# Patient Record
Sex: FEMALE | Race: WHITE | NOT HISPANIC OR LATINO | Employment: OTHER | URBAN - METROPOLITAN AREA
[De-identification: names, ages, dates, MRNs, and addresses within clinical notes are randomized per-mention and may not be internally consistent; named-entity substitution may affect disease eponyms.]

---

## 2019-08-09 ENCOUNTER — OFFICE VISIT CONVERTED (OUTPATIENT)
Dept: ORTHOPEDIC SURGERY | Facility: CLINIC | Age: 59
End: 2019-08-09
Attending: ORTHOPAEDIC SURGERY

## 2019-10-25 ENCOUNTER — HOSPITAL ENCOUNTER (OUTPATIENT)
Dept: GENERAL RADIOLOGY | Facility: HOSPITAL | Age: 59
Discharge: HOME | End: 2019-10-25
Attending: FAMILY MEDICINE

## 2019-11-05 ENCOUNTER — OFFICE VISIT CONVERTED (OUTPATIENT)
Dept: ORTHOPEDIC SURGERY | Facility: CLINIC | Age: 59
End: 2019-11-05
Attending: PHYSICIAN ASSISTANT

## 2020-03-05 ENCOUNTER — OFFICE VISIT CONVERTED (OUTPATIENT)
Dept: ORTHOPEDIC SURGERY | Facility: CLINIC | Age: 60
End: 2020-03-05
Attending: ORTHOPAEDIC SURGERY

## 2021-02-09 ENCOUNTER — HOSPITAL ENCOUNTER (OUTPATIENT)
Dept: GENERAL RADIOLOGY | Facility: HOSPITAL | Age: 61
Discharge: HOME | End: 2021-02-09
Attending: NURSE PRACTITIONER

## 2021-02-16 ENCOUNTER — HOSPITAL ENCOUNTER (OUTPATIENT)
Dept: GENERAL RADIOLOGY | Facility: HOSPITAL | Age: 61
Discharge: HOME | End: 2021-02-16
Attending: NURSE PRACTITIONER

## 2021-04-02 ENCOUNTER — HOSPITAL ENCOUNTER (OUTPATIENT)
Dept: VACCINE CLINIC | Facility: HOSPITAL | Age: 61
Discharge: HOME | End: 2021-04-02
Attending: INTERNAL MEDICINE

## 2021-04-07 ENCOUNTER — OFFICE VISIT CONVERTED (OUTPATIENT)
Dept: GASTROENTEROLOGY | Facility: CLINIC | Age: 61
End: 2021-04-07
Attending: NURSE PRACTITIONER

## 2021-04-07 ENCOUNTER — CONVERSION ENCOUNTER (OUTPATIENT)
Dept: GASTROENTEROLOGY | Facility: CLINIC | Age: 61
End: 2021-04-07

## 2021-04-09 ENCOUNTER — HOSPITAL ENCOUNTER (OUTPATIENT)
Dept: PREADMISSION TESTING | Facility: HOSPITAL | Age: 61
Discharge: HOME | End: 2021-04-09
Attending: INTERNAL MEDICINE

## 2021-04-10 LAB — SARS-COV-2 RNA SPEC QL NAA+PROBE: NOT DETECTED

## 2021-04-13 ENCOUNTER — HOSPITAL ENCOUNTER (OUTPATIENT)
Dept: GENERAL RADIOLOGY | Facility: HOSPITAL | Age: 61
Discharge: HOME | End: 2021-04-13
Attending: NURSE PRACTITIONER

## 2021-04-14 ENCOUNTER — HOSPITAL ENCOUNTER (OUTPATIENT)
Dept: GASTROENTEROLOGY | Facility: HOSPITAL | Age: 61
Setting detail: HOSPITAL OUTPATIENT SURGERY
Discharge: HOME | End: 2021-04-14
Attending: INTERNAL MEDICINE

## 2021-04-26 ENCOUNTER — HOSPITAL ENCOUNTER (OUTPATIENT)
Dept: VACCINE CLINIC | Facility: HOSPITAL | Age: 61
Discharge: HOME | End: 2021-04-26
Attending: INTERNAL MEDICINE

## 2021-05-11 NOTE — H&P
History and Physical      Patient Name: Ida Vincent   Patient ID: [id number]   Sex: Female   YOB: 1960    Primary Care Provider: JOSE HURTADO   Referring Provider: JOSE HURTADO    Visit Date: April 7, 2021    Provider: BRYANT Warner   Location: [facility]   Location Address: [address] [place] [address], [address]  [id number]   Location Phone: [phone]          Chief Complaint  · Weight Loss      History Of Present Illness  The patient is a 60 year old /White female who presents on referral from JOSE HURTADO for a gastroenterology evaluation.      She presents for evaluation of unexplained weight loss.    Reports that she's lost 30 lbs. since November.  She had all her teeth pulled Oct/Nov. 2020.  She has dentures, but finds it difficult to eat certain foods.  Reports that she experiences early satiety.      She fell in August and has 2 compression fractures in spine.      Admits vomiting that usually occurs in the mornings after she takes a few sips of coffee.  This morning, emesis was bright yellow prior to eating.  Denies abdominal pain.      Admits intermittent constipation that she takes a laxative for about 3 times per week.  Previously tried Metamucil and miralax.  Denies rectal bleeding.      Previous EGD/colonoscopy 9 years ago - normal per patient.    Previously dx with GERD.  No symptoms in the past 3 years.                   Past Medical History  Arthritis; Asthma; Chest pain; Chronic Obstructive Pulmonary Disease; Heart block; Hyperlipemia; Lung disease; Seasonal allergies; Shortness of Breath; Thyroid disorder         Past Surgical History  Colonoscopy; EGD; Gallbladder; Hysterectomy; Metal implants; Surgical Clips; Tonsillectomy         Medication List  Aspir-81 81 mg oral tablet,delayed release (DR/EC); baclofen 10 mg oral tablet; Centrum Silver 0.4-300-250 mg-mcg-mcg oral tablet;  "Crestor 40 mg oral tablet; Fosamax 70 mg oral tablet; hydrocodone-acetaminophen 5-325 mg oral tablet; hydroxyzine HCl 25 mg oral tablet; MoviPrep 100-7.5-2.691 gram oral powder in packet; ProAir HFA 90 mcg/actuation inhalation HFA aerosol inhaler; Synthroid 50 mcg oral tablet; Toprol XL 25 mg oral tablet extended release 24 hr; Toprol XL 50 mg oral tablet extended release 24 hr; trazodone 100 mg oral tablet; Trelegy Ellipta 100-62.5-25 mcg inhalation blister with device; Trintellix 10 mg oral tablet; Zofran 4 mg oral tablet; Zyrtec 10 mg oral capsule         Allergy List  Cipro; erythromycin; prednisone; TETANUS; thimerosal       Allergies Reconciled  Family Medical History  Heart Disease; Cancer, Unspecified; Diabetes, unspecified type; - No Family History of Colorectal Cancer; Family history of Arthritis         Social History  Alcohol Use (Never); Homemaker.; lives with spouse; .; Recreational Drug Use (Never); Retired.; Tobacco (Current every day)         Review of Systems  · Constitutional  o Denies  o : chills, fever  · Eyes  o Denies  o : blurred vision, changes in vision  · Cardiovascular  o Denies  o : chest pain, irregular heart beats  · Respiratory  o Denies  o : shortness of breath, cough  · Gastrointestinal  o Admits  o : See HPI  · Genitourinary  o Denies  o : dysuria, blood in urine  · Integument  o Denies  o : rash, new skin lesions  · Neurologic  o Denies  o : tingling or numbness, seizures  · Musculoskeletal  o Denies  o : joint pain, joint swelling  · Endocrine  o Admits  o : weight loss  o Denies  o : weight gain  · Psychiatric  o Denies  o : anxiety, depression      Vitals  Date Time BP Position Site L\R Cuff Size HR RR TEMP (F) WT  HT  BMI kg/m2 BSA m2 O2 Sat FR L/min FiO2 HC       04/07/2021 02:02 /55 Sitting    55 - R  97.5 97lbs 6oz 5'  5\" 16.2 1.42             Physical Examination  · Constitutional  o Appearance  o : well developed, well-nourished, in no acute " distress  · Eyes  o Vision  o :   § Visual Fields  § : eyes move symmetrical in all directions  o Sclerae  o : anicteric  o Pupils and Irises  o : pupils equal and symmetrical  · Neck  o Inspection/Palpation  o : supple  · Respiratory  o Respiratory Effort  o : breathing unlabored  o Inspection of Chest  o : normal appearance, no retractions  o Auscultation of Lungs  o : clear to auscultation bilaterally  · Cardiovascular  o Heart  o :   § Auscultation of Heart  § : no murmurs, gallops or rubs  · Gastrointestinal  o Abdominal Examination  o : soft, nontender to palpation, with normal active bowel sounds, no appreciable hepatosplenomegaly  o Digital Rectal Exam  o : deferred  · Lymphatic  o Neck  o : no palpable lymphadenopathy  · Skin and Subcutaneous Tissue  o General Inspection  o : without focal lesions; turgor is normal  · Psychiatric  o General  o : Alert and oriented x3  o Mood and Affect  o : Mood and affect are appropriate to circumstances  · Extremities  o Extremities  o : No edema, no cyanosis          Assessment  · Pre-op testing     V72.84/Z01.818  · Early satiety     780.94/R68.81  · Vomiting alone     787.03/R11.11  · Weight loss     783.21/R63.4  · Anorexia     783.0/R63.0  · Fatigue     780.79/R53.83      Plan  · Orders  o [facility] Pre-Op Covid-19 Screening (32330) - - 04/07/2021  o CT abdomen and pelvis with contrast (09555) - - 04/07/2021  o Consent for Colonoscopy with Possible Biopsy - Possible risks/complications, benefits, and alternatives to surgical or invasive procedure have been explained to patient and/or legal guardian. -Patient has been evaluated and can tolerate anesthesia and/or sedation. Risks, benefits, and alternatives to anesthesia and sedation have been explained to patient and/or legal guardian. (04017) - - 04/07/2021  o Consent for Esophagogastroduodenoscopy (EGD) - Possible risks/complications, benefits, and alternatives to surgical or invasive procedure have been explained to  patient and/or legal guardian. - Patient has been evaluated and can tolerate anesthesia and/or sedation. Risks, benefits, and alternatives to anesthesia and sedation have been explained to patient and/or legal guardian. (68151) - - 04/07/2021  · Medications  o Medications have been Reconciled  · Instructions  o Handouts provided: Pre-procedure instructions including date and time and location of procedure.  o PLAN: Proceed with procedure. Patient understands risks and benefits and is willing to proceed. Understands the risks include, but are not limited to, bleeding and/or perforation.  o Information given on current diagnoses.  o Electronically Identified Patient Education Materials Provided Electronically  · Disposition  o Follow Up after procedure  · Referrals  o ID: [id number] Date: 04/07/2021 Type: Inbound  Specialty: Gastroenterology            Electronically Signed by: BRYANT Warner -Author on April 7, 2021 04:06:45 PM

## 2021-05-14 VITALS
HEART RATE: 55 BPM | BODY MASS INDEX: 16.22 KG/M2 | HEIGHT: 65 IN | DIASTOLIC BLOOD PRESSURE: 55 MMHG | SYSTOLIC BLOOD PRESSURE: 105 MMHG | WEIGHT: 97.37 LBS | TEMPERATURE: 97.5 F

## 2021-05-15 VITALS — BODY MASS INDEX: 21.07 KG/M2 | HEIGHT: 65 IN | WEIGHT: 126.44 LBS | HEART RATE: 79 BPM | OXYGEN SATURATION: 97 %

## 2021-05-15 VITALS — BODY MASS INDEX: 21.16 KG/M2 | OXYGEN SATURATION: 95 % | WEIGHT: 127 LBS | HEIGHT: 65 IN | HEART RATE: 87 BPM

## 2021-05-15 VITALS — BODY MASS INDEX: 21.16 KG/M2 | OXYGEN SATURATION: 93 % | WEIGHT: 127 LBS | HEART RATE: 111 BPM | HEIGHT: 65 IN

## 2021-05-18 ENCOUNTER — OFFICE VISIT CONVERTED (OUTPATIENT)
Dept: NEUROSURGERY | Facility: CLINIC | Age: 61
End: 2021-05-18
Attending: NEUROLOGICAL SURGERY

## 2021-06-04 ENCOUNTER — HOSPITAL ENCOUNTER (OUTPATIENT)
Dept: PERIOP | Facility: HOSPITAL | Age: 61
Setting detail: HOSPITAL OUTPATIENT SURGERY
Discharge: HOME | End: 2021-06-04
Attending: NEUROLOGICAL SURGERY

## 2021-06-05 NOTE — H&P
History and Physical      Patient Name: Ida Vincent   Patient ID: [id number]   Sex: Female   YOB: 1960    Primary Care Provider: JOSE HURTADO   Referring Provider: JOSE HURTADO    Visit Date: May 18, 2021    Provider: Mukul Cummings MD   [facility]   Location Address: [address] [place] [address]  [id number]   Location Phone: [phone]          Chief Complaint  · Back and left leg pain  · Surgical History and Physical      History Of Present Illness  The patient is a 60 year old /White female, who presents on referral from JOSE HURTADO, for a neurosurgical evaluation of low back pain and left leg pain.   The pain developed acutely approximately 9 months ago. It is 6-7/10 in severity has an aching and a sharp quality and radiates into the left groin/buttock/posterior thigh in a nonspecific distribution. The back is worse than the leg (the leg is not very painful). The pain has been constant. The pain tends to be maximal at no specific time, but waxes and wanes in severity throughout the day. The patient states the pain is aggravated by prolonged standing, sleeping, and extending her back. It is alleviated by heat.   She also reports intermittent paresthesia in the leg. She denies bowel or bladder dysfunction. The patient has no prior history of neck or back surgery.   RECENT INTERVENTIONS:  She has been previously treated with pain medication and back brace (doesn't wear often).   INFORMATION REVIEWED:  The following information was reviewed: radiology reports and images. MRI of the lumbar spine revealed L2 compression fracture and moderate stenosis at L4-5. There is a questionable healed fracture of the T12 superior endplate fracture.       Past Medical History  Arthritis; Asthma; Chest pain; Chronic Obstructive Pulmonary Disease; Heart block; Hyperlipemia; Lumbago; Lung disease; Seasonal  allergies; Shortness of Breath; Thyroid disorder         Past Surgical History  Colonoscopy; EGD; Gallbladder; Hysterectomy; Metal implants; Surgical Clips; Tonsillectomy         Medication List  Aspir-81 81 mg oral tablet,delayed release (DR/EC); baclofen 10 mg oral tablet; Centrum Silver 0.4-300-250 mg-mcg-mcg oral tablet; Crestor 40 mg oral tablet; Fosamax 70 mg oral tablet; hydrocodone-acetaminophen 5-325 mg oral tablet; hydroxyzine HCl 25 mg oral tablet; ProAir HFA 90 mcg/actuation inhalation HFA aerosol inhaler; quetiapine 25 mg oral tablet; Synthroid 50 mcg oral tablet; Toprol XL 25 mg oral tablet extended release 24 hr; Toprol XL 50 mg oral tablet extended release 24 hr; trazodone 100 mg oral tablet; Trelegy Ellipta 100-62.5-25 mcg inhalation blister with device; Trintellix 10 mg oral tablet; Zofran 4 mg oral tablet; Zyrtec 10 mg oral capsule         Allergy List  Cipro; erythromycin; prednisone; TETANUS; thimerosal       Allergies Reconciled  Family Medical History  Heart Disease; Cancer, Unspecified; Diabetes, unspecified type; - No Family History of Colorectal Cancer; Family history of Arthritis         Social History  Alcohol Use (Never); Homemaker.; lives with spouse; .; Recreational Drug Use (Never); Retired.; Tobacco (Former)         Review of Systems  · Constitutional  o Denies  o : chills, excessive sweating, fatigue, fever, sycope/passing out, weight gain, weight loss  · Eyes  o Denies  o : changes in vision, blurry vision, double vision  · HENT  o Admits  o : seasonal allergies  o Denies  o : loss of hearing, ringing in the ears, ear aches, sore throat, nasal congestion, sinus pain, nose bleeds  · Cardiovascular  o Denies  o : blood clots, swollen legs, anemia, easy burising or bleeding, transfusions  · Respiratory  o Denies  o : shortness of breath, dry cough, productive cough, pneumonia, COPD  · Gastrointestinal  o Denies  o : difficulty swallowing, reflux  · Genitourinary  o Denies  o :  "incontinence  · Neurologic  o Denies  o : headache, seizure, stroke, tremor, loss of balance, falls, dizziness/vertigo, difficulty with sleep, numbness/tingling/paresthesia , difficulty with coordination, difficulty with dexterity, weakness  · Musculoskeletal  o Admits  o : joint pain, pain radiating in leg, low back pain  o Denies  o : neck stiffness/pain, swollen lymph nodes, muscle aches, weakness, spasms, sciatica, pain radiating in arm  · Endocrine  o Denies  o : diabetes, thyroid disorder  · Psychiatric  o Denies  o : anxiety, depression  · All Others Negative      Vitals  Date Time BP Position Site L\R Cuff Size HR RR TEMP (F) WT  HT  BMI kg/m2 BSA m2 O2 Sat FR L/min FiO2 HC       05/18/2021 01:37 PM        97.6 963lbs 0oz 5'  5\" 160.25 4.48             Physical Examination  · Constitutional  o Appearance  o : well-nourished, well developed, alert, in no acute distress  · Respiratory  o Respiratory Effort  o : breathing unlabored  · Cardiovascular  o Peripheral Vascular System  o :   § Extremities  § : no edema or cyanosis  · Musculoskeletal  o Spine  o :   § Inspection/Palpation  § : TTP in the upper lumbar region  o Right Lower Extremity  o :   § Inspection/Palpation  § : no joint or limb tenderness to palpation, no edema present, no ecchymosis  § Joint Stability  § : joint stability within normal limits  o Left Lower Extremity  o :   § Inspection/Palpation  § : no joint or limb tenderness to palpation, no edema present, no ecchymosis  § Joint Stability  § : joint stability within normal limits  · Skin and Subcutaneous Tissue  o Extremities  o :   § Right Lower Extremity  § : no lesions or areas of discoloration  § Left Lower Extremity  § : no lesions or areas of discoloration  o Back  o : no lesions or areas of discoloration  · Neurologic  o Mental Status Examination  o :   § Orientation  § : alert and oriented to time, person, place and events  o Motor Examination  o :   § RLE Strength  § : strength " normal  § RLE Motor Function  § : tone normal, no atrophy, no abnormal movements noted  § LLE Strength  § : strength normal  § LLE Motor Function  § : tone normal, no atrophy, no abnormal movements noted  o Reflexes  o :   § RLE  § : knee and ankle reflexes 2/4, SLR negative  § LLE  § : knee and ankle reflexes 2/4, SLR negative  o Sensation  o :   § Light Touch  § : sensation intact to light touch in extremities  o Gait and Station  o :   § Gait Screening  § : normal gait, able to stand without difficulty  · Psychiatric  o Mood and Affect  o : mood normal, affect appropriate              Assessment  · Lumbar compression fracture     805.4/S32.000A  L2, not fully healed on MRI from Feb.  · Osteoporosis     733.00/M81.0  · Preoperative examination     V72.84/Z01.818      Plan  · Medications  o Medications have been Reconciled  o Transition of Care or Provider Policy  · Instructions  o Encouraged to follow-up with Primary Care Provider for preventative care.  o The ROS and the PFSH were reviewed at today's visit.  o I have discussed the risks and benefits of surgery versus physical therapy, epidural steroids, and other conservative forms of treatment.  o She recently quit smoking.  o She could consider an L2 vertebroplasty for the lower back pain. The leg pain is minimal so I would not recommend surgery for the moderate stenosis.  o She should consider resuming treatment with Fosamax once OK.  o ****Surgical Orders****  o Outpatient  o RISK AND BENEFITS:  o Possible risks/complications, benefits and alternatives to surgical or invasive procedure have been explained to the patient and/or legal guardian.  o PREP: Per protocol;  o IV: Per Anesthesia;  o *******************************************  o PRE- OP MEDICATION ORDER:  o *******************************************  o Kefzol 2 grams IV on call to OR.  o ***************  o Date of Surgical Procedure:   o Please sign permit for: Lumbar two vertebroplasty  o The above  History and Physical must have been completed within 30 days of admission.            Electronically Signed by: Mukul Cummings MD -Author on May 18, 2021 02:13:59 PM

## 2021-06-05 NOTE — PROCEDURES
Patient: CAMDEN MCKEON     Acct: [id number]     Report: #[id number]  MR #:  [id number]     DOS: 2021 1015     : 1960  DICTATING: Mukul Cummings  ***Signed***  --------------------------------------------------------------------------------------------------------------------  Post Procedure/Operative Note      Date       21            Pre-Operative Diagnosis:      Osteoporotic lumbar 2 compression fracture            Post-Operative Diagnosis:      Same as pre-op diagnosis            Surgeon/Assistants      Surgeon      Emiliano Reinoso            Anesthesia      General            Procedure Performed/Technique      Lumbar 2 vertebroplasty            Specimen/Tissue Removed:      None            Findings:            Compression deformity at lumbar 2 with good filling pattern after      injection of PMMA            Complications:      No            Estimated Blood Loss:      None            Procedure:      60-year-old female with lumbar 2 compression fracture and history of     osteoporosis.  Fracture was not healing as desired and patient opted for     vertebroplasty.            Description of procedure: After informed consent was obtained, the patient was     brought to the operating room.  After the induction of adequate general     endotracheal esthesia, she is placed in the prone position on the Emiliano table     with massaging pads.  Her back was prepped and draped in typical fashion.      Timeout was performed.  Spinal needle was used to localize the lumbar to     vertebral body on      4 cm off the midline to the left.  A small stab incision was then made after     injecting local anesthetic.  The Jamshidi needle was advanced to the lateral     edge of the L2 pedicle.  Fluoroscopic guidance was used to advance the needle     into the vertebral body using both AP and lateral views.  After advancing to the    anterior one third of the vertebral body the  confidence bone cement was mixed.      Using live fluoroscopy the bone cement was injected into the Jamshidi needle     with good filling pattern.  Approximately 3 mL of cement was injected.  There     did not appear to be any notable extravasation of the bone cement.  The Jamshidi    needle was removed.  The small stab incision was dressed with Mastisol Steri-    Strips Telfa and Tegaderm.  There were no apparent intraoperative complications.     All sponge and needle counts were correct.  The patient received a dose of p    reoperative antibiotics.            Mukul Cummings                    Jun 4, 2021 10:15      Electronically signed by Mukul Cummings  06/04/2021 10:15     Disclaimer: Converted hospital document may not contain table formatting or lab diagrams. Please see [email or web address] for authenticated document.

## 2021-06-24 ENCOUNTER — OFFICE VISIT (OUTPATIENT)
Dept: NEUROSURGERY | Facility: CLINIC | Age: 61
End: 2021-06-24

## 2021-06-24 VITALS
HEIGHT: 65 IN | BODY MASS INDEX: 15.86 KG/M2 | SYSTOLIC BLOOD PRESSURE: 96 MMHG | TEMPERATURE: 96.4 F | OXYGEN SATURATION: 98 % | DIASTOLIC BLOOD PRESSURE: 47 MMHG | WEIGHT: 95.2 LBS | HEART RATE: 61 BPM

## 2021-06-24 DIAGNOSIS — Z98.890 S/P VERTEBROPLASTY: ICD-10-CM

## 2021-06-24 DIAGNOSIS — S32.020D CLOSED WEDGE COMPRESSION FRACTURE OF L2 VERTEBRA WITH ROUTINE HEALING, SUBSEQUENT ENCOUNTER: Primary | ICD-10-CM

## 2021-06-24 PROCEDURE — 99213 OFFICE O/P EST LOW 20 MIN: CPT | Performed by: NURSE PRACTITIONER

## 2021-06-24 RX ORDER — QUETIAPINE FUMARATE 25 MG/1
50 TABLET, FILM COATED ORAL NIGHTLY
COMMUNITY
End: 2023-01-06 | Stop reason: SDUPTHER

## 2021-06-24 RX ORDER — METOPROLOL SUCCINATE 25 MG/1
25 TABLET, EXTENDED RELEASE ORAL 2 TIMES DAILY
COMMUNITY
End: 2023-01-11 | Stop reason: SDUPTHER

## 2021-06-24 RX ORDER — ASPIRIN 81 MG/1
81 TABLET ORAL DAILY
COMMUNITY
End: 2021-10-18 | Stop reason: HOSPADM

## 2021-06-24 RX ORDER — ALBUTEROL SULFATE 90 UG/1
2 AEROSOL, METERED RESPIRATORY (INHALATION) EVERY 4 HOURS PRN
COMMUNITY

## 2021-06-24 RX ORDER — VORTIOXETINE 10 MG/1
1 TABLET, FILM COATED ORAL 2 TIMES WEEKLY
COMMUNITY
End: 2023-01-06

## 2021-06-24 RX ORDER — TRAZODONE HYDROCHLORIDE 100 MG/1
200 TABLET ORAL NIGHTLY
COMMUNITY

## 2021-06-24 RX ORDER — PANTOPRAZOLE SODIUM 20 MG/1
TABLET, DELAYED RELEASE ORAL
COMMUNITY
End: 2021-07-27

## 2021-06-24 RX ORDER — LEVOTHYROXINE SODIUM 0.05 MG/1
50 TABLET ORAL
COMMUNITY
End: 2023-01-06 | Stop reason: SDUPTHER

## 2021-06-24 RX ORDER — ALENDRONATE SODIUM 35 MG/1
35 TABLET ORAL
COMMUNITY
End: 2023-02-07

## 2021-06-24 RX ORDER — METOPROLOL SUCCINATE 50 MG/1
50 TABLET, EXTENDED RELEASE ORAL 2 TIMES DAILY
COMMUNITY
End: 2023-02-28 | Stop reason: SDUPTHER

## 2021-06-24 RX ORDER — VARENICLINE TARTRATE 1 MG/1
TABLET, FILM COATED ORAL
COMMUNITY
End: 2021-10-15

## 2021-06-24 RX ORDER — ROSUVASTATIN CALCIUM 40 MG/1
40 TABLET, COATED ORAL NIGHTLY
COMMUNITY

## 2021-06-24 RX ORDER — ONDANSETRON 4 MG/1
4 TABLET, FILM COATED ORAL EVERY 8 HOURS PRN
COMMUNITY
End: 2021-12-07 | Stop reason: SDUPTHER

## 2021-06-24 RX ORDER — CETIRIZINE HYDROCHLORIDE 10 MG/1
CAPSULE, LIQUID FILLED ORAL
COMMUNITY
End: 2021-10-15

## 2021-06-24 NOTE — PATIENT INSTRUCTIONS
-No heavy lifting >5 lb, no twisting or bending at the waist  -XR Lumbar Spine in 1 month  -Back brace as needed  -Follow up as needed

## 2021-06-24 NOTE — PROGRESS NOTES
"Chief Complaint  Post-op (L2 vertebroplasty, 6/4/21)    Subjective          Ida Vincent who is a 61 y.o. year old female who presents to [facility] 3 weeks post-op L2 vertebroplasty.    Pt has appreciated good improvement to her back and leg pain. She has some achy pain along the back with occasional spasm. She has been followed by pain management for her chronic pain symptoms. Has been weaning off her medications.     She has history prior T12 fracture as well. She has osteoporosis, treated with Fosamax.       Interval History   The patient is a 60 year old /White female, who presents on referral from JOSE HURTADO, for a neurosurgical evaluation of low back pain and left leg pain.   The pain developed acutely approximately 9 months ago. It is 6-7/10 in severity has an aching and a sharp quality and radiates into the left groin/buttock/posterior thigh in a nonspecific distribution. The back is worse than the leg (the leg is not very painful). The pain has been constant. The pain tends to be maximal at no specific time, but waxes and wanes in severity throughout the day. The patient states the pain is aggravated by prolonged standing, sleeping, and extending her back. It is alleviated by heat.   She also reports intermittent paresthesia in the leg. She denies bowel or bladder dysfunction. The patient has no prior history of neck or back surgery.     Recent Interventions: Vertebroplasty      Review of Systems   Constitutional: Positive for unexpected weight loss.   Musculoskeletal: Positive for arthralgias and back pain.   All other systems reviewed and are negative.       Objective   Vital Signs:   BP 96/47   Pulse 61   Temp 96.4 °F (35.8 °C)   Ht 165.1 cm (65\")   Wt 43.2 kg (95 lb 3.2 oz)   SpO2 98%   BMI 15.84 kg/m²       Physical Exam  Vitals reviewed.   Constitutional:       Appearance: Normal appearance.   Skin:       "   Neurological:      Mental Status: She is alert and oriented to person, place, and time.      Gait: Gait is intact.      Deep Tendon Reflexes: Strength normal.        Neurologic Exam     Mental Status   Oriented to person, place, and time.   Level of consciousness: alert    Motor Exam   Muscle bulk: normal  Overall muscle tone: normal    Strength   Strength 5/5 throughout.     Gait, Coordination, and Reflexes     Gait  Gait: normal       Result Review :                 Assessment and Plan    Diagnoses and all orders for this visit:    1. Closed wedge compression fracture of L2 vertebra with routine healing, subsequent encounter (Primary)  -     XR Spine Lumbar 2 or 3 View; Future    2. S/P vertebroplasty  -     XR Spine Lumbar 2 or 3 View; Future    Pt with history of multiple spine fractures. Recent vertebroplasty. She has osteoporosis, at high risk for recurrent fractures. She previously worked as a nurse. She is not capable of returning to work.     Will order XR Lumbar Spine in one month. She can follow up as needed and is aware to call should her pain symptoms return or if she has new pain symptoms.       Follow Up   Return if symptoms worsen or fail to improve.  Patient was given instructions and counseling regarding her condition or for health maintenance advice.     -No heavy lifting >5 lb, no twisting or bending at the waist  -XR Lumbar Spine in 1 month  -Back brace as needed  -Follow up as needed

## 2021-07-15 VITALS — BODY MASS INDEX: 48.82 KG/M2 | HEIGHT: 65 IN | WEIGHT: 293 LBS | TEMPERATURE: 97.6 F

## 2021-07-27 ENCOUNTER — OFFICE VISIT (OUTPATIENT)
Dept: GASTROENTEROLOGY | Facility: CLINIC | Age: 61
End: 2021-07-27

## 2021-07-27 VITALS
HEART RATE: 70 BPM | WEIGHT: 100.2 LBS | DIASTOLIC BLOOD PRESSURE: 55 MMHG | BODY MASS INDEX: 16.69 KG/M2 | HEIGHT: 65 IN | SYSTOLIC BLOOD PRESSURE: 101 MMHG

## 2021-07-27 DIAGNOSIS — K29.50 CHRONIC GASTRITIS WITHOUT BLEEDING, UNSPECIFIED GASTRITIS TYPE: Primary | ICD-10-CM

## 2021-07-27 DIAGNOSIS — R11.0 NAUSEA: ICD-10-CM

## 2021-07-27 DIAGNOSIS — K59.04 CHRONIC IDIOPATHIC CONSTIPATION: ICD-10-CM

## 2021-07-27 PROBLEM — J44.9 CHRONIC OBSTRUCTIVE PULMONARY DISEASE: Status: ACTIVE | Noted: 2021-07-27

## 2021-07-27 PROCEDURE — 99214 OFFICE O/P EST MOD 30 MIN: CPT | Performed by: NURSE PRACTITIONER

## 2021-07-27 RX ORDER — PANTOPRAZOLE SODIUM 40 MG/1
40 TABLET, DELAYED RELEASE ORAL DAILY
Qty: 90 TABLET | Refills: 1 | Status: SHIPPED | OUTPATIENT
Start: 2021-07-27 | End: 2021-10-25

## 2021-07-27 RX ORDER — CETIRIZINE HYDROCHLORIDE 10 MG/1
10 TABLET ORAL NIGHTLY
COMMUNITY
Start: 2021-06-24 | End: 2023-01-06 | Stop reason: SDUPTHER

## 2021-07-27 NOTE — PROGRESS NOTES
Chief Complaint  Follow-up (EGD results and constipation)    Ida Vincent is a 61 y.o. female who presents to [facility] for follow-up of unexplained weight loss.  History of present Illness  Reports that she's experiencing nausea in the mornings.  Takes zofran each morning.  Vomiting is present 1-2 days per week.  Emesis usually contains a lot of bile acid.  She has gained a few pounds since initial visit.    Continues to experience constipation and is straining to have a bowel movement.  Bowel movements are often large.        Past Medical History:   Diagnosis Date   • Arthritis    • Asthma    • Chest pain    • COPD (chronic obstructive pulmonary disease) (CMS/HCC)    • Heart block    • Hyperlipemia    • Hypertension    • Low back pain    • Lumbago    • Lung disease    • Seasonal allergies    • Shortness of breath    • Thyroid disorder        Past Surgical History:   Procedure Laterality Date   • ATRIAL SEPTAL DEFECT REPAIR     • BREAST AUGMENTATION     • CARDIAC CATHETERIZATION     • COLONOSCOPY  2011   • DENTAL PROCEDURE     • ENDOSCOPY  2011   • GALLBLADDER SURGERY     • HYSTERECTOMY     • OSTEOTOMY     • OTHER SURGICAL HISTORY      metal implants    • OTHER SURGICAL HISTORY      surgical clips    • SHOULDER ARTHROSCOPIC ACROMIOCLAVICULAR (AC) JOINT RECONSTRUCTION     • TONSILLECTOMY     • VERTEBROPLASTY  06/04/2021    Lumbar 2         Current Outpatient Medications:   •  albuterol sulfate HFA (ProAir HFA) 108 (90 Base) MCG/ACT inhaler, ProAir HFA 90 mcg/actuation inhalation HFA aerosol inhaler inhale 1 puff (90 mcg) by inhalation route every 6 hours as needed   Active, Disp: , Rfl:   •  alendronate (FOSAMAX) 35 MG tablet, alendronate 35 mg tablet, Disp: , Rfl:   •  aspirin (aspirin) 81 MG EC tablet, aspirin 81 mg tablet,delayed release, Disp: , Rfl:   •  cetirizine (zyrTEC) 10 MG tablet, , Disp: , Rfl:   •  Cetirizine HCl (ZyrTEC Allergy) 10 MG capsule, Zyrtec 10 mg  oral capsule take 1 capsule by oral route once   Active, Disp: , Rfl:   •  levothyroxine (Synthroid) 50 MCG tablet, Synthroid 50 mcg tablet, Disp: , Rfl:   •  linaclotide (Linzess) 145 MCG capsule capsule, Take 1 capsule by mouth Every Morning Before Breakfast for 90 days., Disp: 90 capsule, Rfl: 1  •  linaclotide (Linzess) 72 MCG capsule capsule, Take 2 capsules by mouth Every Morning Before Breakfast., Disp: 8 capsule, Rfl: 0  •  metoprolol succinate XL (TOPROL-XL) 25 MG 24 hr tablet, metoprolol succinate ER 25 mg tablet,extended release 24 hr, Disp: , Rfl:   •  metoprolol succinate XL (TOPROL-XL) 50 MG 24 hr tablet, metoprolol succinate ER 50 mg tablet,extended release 24 hr, Disp: , Rfl:   •  ondansetron (ZOFRAN) 4 MG tablet, ondansetron HCl 4 mg tablet, Disp: , Rfl:   •  pantoprazole (Protonix) 40 MG EC tablet, Take 1 tablet by mouth Daily for 90 days., Disp: 90 tablet, Rfl: 1  •  QUEtiapine (SEROquel) 25 MG tablet, quetiapine 25 mg tablet, Disp: , Rfl:   •  rosuvastatin (CRESTOR) 40 MG tablet, rosuvastatin 40 mg tablet, Disp: , Rfl:   •  traZODone (DESYREL) 100 MG tablet, trazodone 100 mg tablet, Disp: , Rfl:   •  Trelegy Ellipta 100-62.5-25 MCG/INH inhaler, , Disp: , Rfl:   •  varenicline (Chantix) 1 MG tablet, Chantix 1 mg tablet, Disp: , Rfl:   •  Vortioxetine HBr (Trintellix) 10 MG tablet, Trintellix 10 mg tablet, Disp: , Rfl:      Allergies   Allergen Reactions   • Ciprofloxacin Anaphylaxis and Other (See Comments)   • Latex Anaphylaxis   • Erythromycin Nausea And Vomiting   • Prednisone Other (See Comments)   • Thimerosal Swelling       Family History   Problem Relation Age of Onset   • Diabetes Mother    • Hypertension Mother    • Anxiety disorder Mother    • Arthritis Mother    • Mental illness Sister    • Arthritis Sister    • Early death Brother    • Diabetes Brother    • Arthritis Brother    • Cancer Father    • Arthritis Father    • Heart disease Daughter         Social History     Social History  "Narrative   • Not on file       Objective     Review of Systems   Constitutional: Negative for fever and unexpected weight loss.   Respiratory: Negative for cough and shortness of breath.    Cardiovascular: Negative for chest pain and palpitations.   Gastrointestinal:        See HPI   Neurological: Negative for weakness and confusion.        Vital Signs:   /55 (BP Location: Left arm, Patient Position: Sitting, Cuff Size: Small Adult)   Pulse 70   Ht 165.1 cm (65\")   Wt 45.5 kg (100 lb 3.2 oz)   BMI 16.67 kg/m²       Physical Exam  Constitutional:       General: She is not in acute distress.     Appearance: Normal appearance. She is well-developed and normal weight.   HENT:      Head: Normocephalic and atraumatic.   Eyes:      Conjunctiva/sclera: Conjunctivae normal.      Pupils: Pupils are equal, round, and reactive to light.      Visual Fields: Right eye visual fields normal and left eye visual fields normal.   Cardiovascular:      Rate and Rhythm: Normal rate and regular rhythm.      Heart sounds: Normal heart sounds.   Pulmonary:      Effort: Pulmonary effort is normal. No retractions.      Breath sounds: Normal breath sounds and air entry.   Abdominal:      General: Bowel sounds are normal.      Palpations: Abdomen is soft.      Tenderness: There is no abdominal tenderness.      Comments: No appreciable hepatosplenomegaly or ascites   Musculoskeletal:         General: Normal range of motion.      Cervical back: Neck supple.      Right lower leg: No edema.      Left lower leg: No edema.   Lymphadenopathy:      Cervical: No cervical adenopathy.   Skin:     General: Skin is warm and dry.      Findings: No lesion.   Neurological:      General: No focal deficit present.      Mental Status: She is alert and oriented to person, place, and time.   Psychiatric:         Mood and Affect: Mood and affect normal.         Behavior: Behavior normal.         Result Review :   The following data was reviewed by: " Mónica Belcher, APRN on 07/27/2021:  CT Abdomen Pelvis With Contrast (04/13/2021 09:16)  CONCLUSION:     1. No acute abnormality in the abdomen or pelvis.    2. Colonic diverticulosis without diverticulitis.    3. Status post cholecystectomy and hysterectomy.    4. Mild superior endplate compression fracture at T12 and moderate superior endplate compression     fracture at L2.  No retropulsed fracture fragment.    5. Aortoiliac atherosclerotic disease.      ENDOSCOPY, INT (04/14/2021)  Small hiatal hernia.  Erythema in the stomach, biopsy-mild reactive gastropathy.  6 mm lesion in the first portion of the duodenum, biopsy-Brunner's gland hyperplasia.  Mild diverticulosis in the sigmoid colon.  3 cm lipoma in the descending colon  Grade 1 internal hemorrhoids.  External hemorrhoids.                   Assessment and Plan    Diagnoses and all orders for this visit:    1. Chronic gastritis without bleeding, unspecified gastritis type (Primary)  -     pantoprazole (Protonix) 40 MG EC tablet; Take 1 tablet by mouth Daily for 90 days.  Dispense: 90 tablet; Refill: 1    2. Chronic idiopathic constipation  -     linaclotide (Linzess) 145 MCG capsule capsule; Take 1 capsule by mouth Every Morning Before Breakfast for 90 days.  Dispense: 90 capsule; Refill: 1    3. Nausea    Other orders  -     linaclotide (Linzess) 72 MCG capsule capsule; Take 2 capsules by mouth Every Morning Before Breakfast.  Dispense: 8 capsule; Refill: 0          * Surgery not found *        Follow Up   Return in about 4 months (around 11/27/2021).  Patient was given instructions and counseling regarding her condition or for health maintenance advice. Please see specific information pulled into the AVS if appropriate.

## 2021-08-03 DIAGNOSIS — S32.020D CLOSED WEDGE COMPRESSION FRACTURE OF L2 VERTEBRA WITH ROUTINE HEALING, SUBSEQUENT ENCOUNTER: ICD-10-CM

## 2021-08-03 DIAGNOSIS — Z98.890 S/P VERTEBROPLASTY: ICD-10-CM

## 2021-10-15 ENCOUNTER — PRE-ADMISSION TESTING (OUTPATIENT)
Dept: PREADMISSION TESTING | Facility: HOSPITAL | Age: 61
End: 2021-10-15

## 2021-10-15 VITALS
WEIGHT: 114 LBS | HEIGHT: 65 IN | TEMPERATURE: 97 F | SYSTOLIC BLOOD PRESSURE: 100 MMHG | HEART RATE: 68 BPM | OXYGEN SATURATION: 98 % | BODY MASS INDEX: 18.99 KG/M2 | RESPIRATION RATE: 16 BRPM | DIASTOLIC BLOOD PRESSURE: 54 MMHG

## 2021-10-15 LAB
ANION GAP SERPL CALCULATED.3IONS-SCNC: 12.3 MMOL/L (ref 5–15)
BUN SERPL-MCNC: 7 MG/DL (ref 6–20)
BUN/CREAT SERPL: 12.1 (ref 7–25)
CALCIUM SPEC-SCNC: 9.4 MG/DL (ref 8.6–10.5)
CHLORIDE SERPL-SCNC: 98 MMOL/L (ref 98–107)
CO2 SERPL-SCNC: 28.7 MMOL/L (ref 22–29)
CREAT SERPL-MCNC: 0.58 MG/DL (ref 0.57–1)
DEPRECATED RDW RBC AUTO: 41.4 FL (ref 37–54)
ERYTHROCYTE [DISTWIDTH] IN BLOOD BY AUTOMATED COUNT: 12.3 % (ref 12.3–15.4)
GFR SERPL CREATININE-BSD FRML MDRD: 106 ML/MIN/1.73
GLUCOSE SERPL-MCNC: 68 MG/DL (ref 65–99)
HCT VFR BLD AUTO: 39.1 % (ref 34–46.6)
HGB BLD-MCNC: 12.9 G/DL (ref 12–15.9)
MCH RBC QN AUTO: 30.2 PG (ref 26.6–33)
MCHC RBC AUTO-ENTMCNC: 33 G/DL (ref 31.5–35.7)
MCV RBC AUTO: 91.6 FL (ref 79–97)
PLATELET # BLD AUTO: 199 10*3/MM3 (ref 140–450)
PMV BLD AUTO: 10.8 FL (ref 6–12)
POTASSIUM SERPL-SCNC: 4.4 MMOL/L (ref 3.5–5.2)
QT INTERVAL: 423 MS
RBC # BLD AUTO: 4.27 10*6/MM3 (ref 3.77–5.28)
SARS-COV-2 RNA RESP QL NAA+PROBE: NOT DETECTED
SODIUM SERPL-SCNC: 139 MMOL/L (ref 136–145)
WBC # BLD AUTO: 5.92 10*3/MM3 (ref 3.4–10.8)

## 2021-10-15 PROCEDURE — 80048 BASIC METABOLIC PNL TOTAL CA: CPT

## 2021-10-15 PROCEDURE — 85027 COMPLETE CBC AUTOMATED: CPT

## 2021-10-15 PROCEDURE — 93005 ELECTROCARDIOGRAM TRACING: CPT

## 2021-10-15 PROCEDURE — C9803 HOPD COVID-19 SPEC COLLECT: HCPCS

## 2021-10-15 PROCEDURE — 93010 ELECTROCARDIOGRAM REPORT: CPT | Performed by: INTERNAL MEDICINE

## 2021-10-15 PROCEDURE — 36415 COLL VENOUS BLD VENIPUNCTURE: CPT

## 2021-10-15 PROCEDURE — U0003 INFECTIOUS AGENT DETECTION BY NUCLEIC ACID (DNA OR RNA); SEVERE ACUTE RESPIRATORY SYNDROME CORONAVIRUS 2 (SARS-COV-2) (CORONAVIRUS DISEASE [COVID-19]), AMPLIFIED PROBE TECHNIQUE, MAKING USE OF HIGH THROUGHPUT TECHNOLOGIES AS DESCRIBED BY CMS-2020-01-R: HCPCS

## 2021-10-15 NOTE — DISCHARGE INSTRUCTIONS
Take the following medications the morning of surgery:    LEVOTHYROXINE,  METOPROLOL AND PROTONIX    ARRIVE AT 10:00      If you are on prescription narcotic pain medication to control your pain you may also take that medication the morning of surgery.    General Instructions:  • Do not eat solid food after midnight the night before surgery.  • You may drink clear liquids day of surgery but must stop at least one hour before your hospital arrival time.  • It is beneficial for you to have a clear drink that contains carbohydrates the day of surgery.  We suggest a 12 to 20 ounce bottle of Gatorade or Powerade for non-diabetic patients or a 12 to 20 ounce bottle of G2 or Powerade Zero for diabetic patients. (Pediatric patients, are not advised to drink a 12 to 20 ounce carbohydrate drink)    Clear liquids are liquids you can see through.  Nothing red in color.     Plain water                               Sports drinks  Sodas                                   Gelatin (Jell-O)  Fruit juices without pulp such as white grape juice and apple juice  Popsicles that contain no fruit or yogurt  Tea or coffee (no cream or milk added)  Gatorade / Powerade  G2 / Powerade Zero    •   • Patients who avoid smoking, chewing tobacco and alcohol for 4 weeks prior to surgery have a reduced risk of post-operative complications.  Quit smoking as many days before surgery as you can.  • Do not smoke, use chewing tobacco or drink alcohol the day of surgery.   • If applicable bring your C-PAP/ BI-PAP machine.  • Bring any papers given to you in the doctor’s office.  • Wear clean comfortable clothes.  • Do not wear contact lenses, false eyelashes or make-up.  Bring a case for your glasses.   • Bring crutches or walker if applicable.  • Remove all piercings.  Leave jewelry and any other valuables at home.  • Hair extensions with metal clips must be removed prior to surgery.  • The Pre-Admission Testing nurse will instruct you to bring  medications if unable to obtain an accurate list in Pre-Admission Testing.          Preventing a Surgical Site Infection:  • For 2 to 3 days before surgery, avoid shaving with a razor because the razor can irritate skin and make it easier to develop an infection.    • Any areas of open skin can increase the risk of a post-operative wound infection by allowing bacteria to enter and travel throughout the body.  Notify your surgeon if you have any skin wounds / rashes even if it is not near the expected surgical site.  The area will need assessed to determine if surgery should be delayed until it is healed.  • The night prior to surgery shower using a fresh bar of anti-bacterial soap (such as Dial) and clean washcloth.  Sleep in a clean bed with clean clothing.  Do not allow pets to sleep with you.  • Shower on the morning of surgery using a fresh bar of anti-bacterial soap (such as Dial) and clean washcloth.  Dry with a clean towel and dress in clean clothing.  • Ask your surgeon if you will be receiving antibiotics prior to surgery.  • Make sure you, your family, and all healthcare providers clean their hands with soap and water or an alcohol based hand  before caring for you or your wound.    Day of surgery:  Your arrival time is approximately two hours before your scheduled surgery time.  Upon arrival, a Pre-op nurse and Anesthesiologist will review your health history, obtain vital signs, and answer questions you may have.  The only belongings needed at this time will be a list of your home medications and if applicable your C-PAP/BI-PAP machine.  A Pre-op nurse will start an IV and you may receive medication in preparation for surgery, including something to help you relax.     Please be aware that surgery does come with discomfort.  We want to make every effort to control your discomfort so please discuss any uncontrolled symptoms with your nurse.   Your doctor will most likely have prescribed pain  medications.      If you are going home after surgery you will receive individualized written care instructions before being discharged.  A responsible adult must drive you to and from the hospital on the day of your surgery and stay with you for 24 hours.  Discharge prescriptions can be filled by the hospital pharmacy during regular pharmacy hours.  If you are having surgery late in the day/evening your prescription may be e-prescribed to your pharmacy.  Please verify your pharmacy hours or chose a 24 hour pharmacy to avoid not having access to your prescription because your pharmacy has closed for the day.    If you are staying overnight following surgery, you will be transported to your hospital room following the recovery period.  [facility] has all private rooms.    If you have any questions please call Pre-Admission Testing at [phone].  Deductibles and co-payments are collected on the day of service. Please be prepared to pay the required co-pay, deductible or deposit on the day of service as defined by your plan.    Patient Education for Self-Quarantine Process    • Following your COVID testing, we strongly recommend that you wear a mask when you are with other people and practice social distancing.   • Limit your activities to only required outings.  • Wash your hands with soap and water frequently for at least 20 seconds.   • Avoid touching your eyes, nose and mouth with unwashed hands.  • Do not share anything - utensils, drinking glasses, food from the same bowl.   • Sanitize household surfaces daily. Include all high touch areas (door handles, light switches, phones, countertops, etc.)    Call your surgeon immediately if you experience any of the following symptoms:  • Sore Throat  • Shortness of Breath or difficulty breathing  • Cough  • Chills  • Body soreness or muscle pain  • Headache  • Fever  • New loss of taste or smell  • Do not arrive for your surgery ill.  Your procedure  will need to be rescheduled to another time.  You will need to call your physician before the day of surgery to avoid any unnecessary exposure to hospital staff as well as other patients.

## 2021-10-18 ENCOUNTER — ANESTHESIA EVENT (OUTPATIENT)
Dept: PERIOP | Facility: HOSPITAL | Age: 61
End: 2021-10-18

## 2021-10-18 ENCOUNTER — HOSPITAL ENCOUNTER (OUTPATIENT)
Facility: HOSPITAL | Age: 61
Setting detail: HOSPITAL OUTPATIENT SURGERY
Discharge: HOME | End: 2021-10-18
Attending: SURGERY | Admitting: SURGERY

## 2021-10-18 ENCOUNTER — ANESTHESIA (OUTPATIENT)
Dept: PERIOP | Facility: HOSPITAL | Age: 61
End: 2021-10-18

## 2021-10-18 VITALS
TEMPERATURE: 98.4 F | HEIGHT: 65 IN | DIASTOLIC BLOOD PRESSURE: 65 MMHG | BODY MASS INDEX: 19.03 KG/M2 | WEIGHT: 114.2 LBS | RESPIRATION RATE: 16 BRPM | HEART RATE: 83 BPM | SYSTOLIC BLOOD PRESSURE: 126 MMHG | OXYGEN SATURATION: 97 %

## 2021-10-18 DIAGNOSIS — N64.81 BREAST PTOSIS: ICD-10-CM

## 2021-10-18 DIAGNOSIS — G89.18 POSTOPERATIVE PAIN: Primary | ICD-10-CM

## 2021-10-18 PROCEDURE — 25010000003 CEFAZOLIN PER 500 MG: Performed by: SURGERY

## 2021-10-18 PROCEDURE — 25010000002 ONDANSETRON PER 1 MG: Performed by: NURSE ANESTHETIST, CERTIFIED REGISTERED

## 2021-10-18 PROCEDURE — 25010000002 MIDAZOLAM PER 1 MG: Performed by: ANESTHESIOLOGY

## 2021-10-18 PROCEDURE — 25010000002 NEOSTIGMINE 5 MG/10ML SOLUTION: Performed by: NURSE ANESTHETIST, CERTIFIED REGISTERED

## 2021-10-18 PROCEDURE — 25010000003 METHYLPREDNISOLONE PER 125 MG: Performed by: SURGERY

## 2021-10-18 PROCEDURE — 25010000002 HYDROMORPHONE PER 4 MG: Performed by: NURSE ANESTHETIST, CERTIFIED REGISTERED

## 2021-10-18 PROCEDURE — 25010000002 PROPOFOL 10 MG/ML EMULSION: Performed by: NURSE ANESTHETIST, CERTIFIED REGISTERED

## 2021-10-18 PROCEDURE — 25010000002 ROPIVACAINE PER 1 MG: Performed by: SURGERY

## 2021-10-18 PROCEDURE — 25010000002 FENTANYL CITRATE (PF) 50 MCG/ML SOLUTION: Performed by: NURSE ANESTHETIST, CERTIFIED REGISTERED

## 2021-10-18 RX ORDER — CYCLOBENZAPRINE HCL 10 MG
10 TABLET ORAL ONCE
Status: COMPLETED | OUTPATIENT
Start: 2021-10-18 | End: 2021-10-18

## 2021-10-18 RX ORDER — FENTANYL CITRATE 50 UG/ML
INJECTION, SOLUTION INTRAMUSCULAR; INTRAVENOUS AS NEEDED
Status: DISCONTINUED | OUTPATIENT
Start: 2021-10-18 | End: 2021-10-18 | Stop reason: SURG

## 2021-10-18 RX ORDER — NALOXONE HCL 0.4 MG/ML
0.2 VIAL (ML) INJECTION AS NEEDED
Status: DISCONTINUED | OUTPATIENT
Start: 2021-10-18 | End: 2021-10-18 | Stop reason: HOSPADM

## 2021-10-18 RX ORDER — ONDANSETRON 2 MG/ML
INJECTION INTRAMUSCULAR; INTRAVENOUS AS NEEDED
Status: DISCONTINUED | OUTPATIENT
Start: 2021-10-18 | End: 2021-10-18 | Stop reason: SURG

## 2021-10-18 RX ORDER — CELECOXIB 200 MG/1
400 CAPSULE ORAL ONCE
Status: COMPLETED | OUTPATIENT
Start: 2021-10-18 | End: 2021-10-18

## 2021-10-18 RX ORDER — NEOSTIGMINE METHYLSULFATE 0.5 MG/ML
INJECTION, SOLUTION INTRAVENOUS AS NEEDED
Status: DISCONTINUED | OUTPATIENT
Start: 2021-10-18 | End: 2021-10-18 | Stop reason: SURG

## 2021-10-18 RX ORDER — IBUPROFEN 600 MG/1
600 TABLET ORAL ONCE AS NEEDED
Status: DISCONTINUED | OUTPATIENT
Start: 2021-10-18 | End: 2021-10-18 | Stop reason: HOSPADM

## 2021-10-18 RX ORDER — BUPIVACAINE HYDROCHLORIDE AND EPINEPHRINE 2.5; 5 MG/ML; UG/ML
INJECTION, SOLUTION EPIDURAL; INFILTRATION; INTRACAUDAL; PERINEURAL AS NEEDED
Status: DISCONTINUED | OUTPATIENT
Start: 2021-10-18 | End: 2021-10-18 | Stop reason: HOSPADM

## 2021-10-18 RX ORDER — CYCLOBENZAPRINE HCL 10 MG
5 TABLET ORAL ONCE
Status: COMPLETED | OUTPATIENT
Start: 2021-10-18 | End: 2021-10-18

## 2021-10-18 RX ORDER — ONDANSETRON 4 MG/1
4 TABLET, FILM COATED ORAL ONCE AS NEEDED
Status: DISCONTINUED | OUTPATIENT
Start: 2021-10-18 | End: 2021-10-18 | Stop reason: HOSPADM

## 2021-10-18 RX ORDER — CLINDAMYCIN PHOSPHATE 900 MG/50ML
900 INJECTION INTRAVENOUS ONCE
Status: COMPLETED | OUTPATIENT
Start: 2021-10-18 | End: 2021-10-18

## 2021-10-18 RX ORDER — LABETALOL HYDROCHLORIDE 5 MG/ML
5 INJECTION, SOLUTION INTRAVENOUS
Status: DISCONTINUED | OUTPATIENT
Start: 2021-10-18 | End: 2021-10-18 | Stop reason: HOSPADM

## 2021-10-18 RX ORDER — HYDROCODONE BITARTRATE AND ACETAMINOPHEN 7.5; 325 MG/1; MG/1
1 TABLET ORAL ONCE AS NEEDED
Status: DISCONTINUED | OUTPATIENT
Start: 2021-10-18 | End: 2021-10-18 | Stop reason: HOSPADM

## 2021-10-18 RX ORDER — SODIUM CHLORIDE 0.9 % (FLUSH) 0.9 %
3 SYRINGE (ML) INJECTION EVERY 12 HOURS SCHEDULED
Status: DISCONTINUED | OUTPATIENT
Start: 2021-10-18 | End: 2021-10-18 | Stop reason: HOSPADM

## 2021-10-18 RX ORDER — LIDOCAINE HYDROCHLORIDE 20 MG/ML
INJECTION, SOLUTION INFILTRATION; PERINEURAL AS NEEDED
Status: DISCONTINUED | OUTPATIENT
Start: 2021-10-18 | End: 2021-10-18 | Stop reason: SURG

## 2021-10-18 RX ORDER — EPHEDRINE SULFATE 50 MG/ML
5 INJECTION, SOLUTION INTRAVENOUS ONCE AS NEEDED
Status: DISCONTINUED | OUTPATIENT
Start: 2021-10-18 | End: 2021-10-18 | Stop reason: HOSPADM

## 2021-10-18 RX ORDER — MIDAZOLAM HYDROCHLORIDE 1 MG/ML
0.5 INJECTION INTRAMUSCULAR; INTRAVENOUS
Status: DISCONTINUED | OUTPATIENT
Start: 2021-10-18 | End: 2021-10-18 | Stop reason: HOSPADM

## 2021-10-18 RX ORDER — OXYCODONE AND ACETAMINOPHEN 10; 325 MG/1; MG/1
.5-1 TABLET ORAL EVERY 4 HOURS PRN
Qty: 20 TABLET | Refills: 0 | Status: SHIPPED | OUTPATIENT
Start: 2021-10-18 | End: 2023-01-06

## 2021-10-18 RX ORDER — ACETAMINOPHEN 325 MG/1
975 TABLET ORAL ONCE
Status: COMPLETED | OUTPATIENT
Start: 2021-10-18 | End: 2021-10-18

## 2021-10-18 RX ORDER — HYDROMORPHONE HYDROCHLORIDE 1 MG/ML
0.25 INJECTION, SOLUTION INTRAMUSCULAR; INTRAVENOUS; SUBCUTANEOUS
Status: DISCONTINUED | OUTPATIENT
Start: 2021-10-18 | End: 2021-10-18 | Stop reason: HOSPADM

## 2021-10-18 RX ORDER — SODIUM CHLORIDE 0.9 % (FLUSH) 0.9 %
3-10 SYRINGE (ML) INJECTION AS NEEDED
Status: DISCONTINUED | OUTPATIENT
Start: 2021-10-18 | End: 2021-10-18 | Stop reason: HOSPADM

## 2021-10-18 RX ORDER — DOCUSATE SODIUM 100 MG/1
100 CAPSULE, LIQUID FILLED ORAL ONCE
Status: COMPLETED | OUTPATIENT
Start: 2021-10-18 | End: 2021-10-18

## 2021-10-18 RX ORDER — PROMETHAZINE HYDROCHLORIDE 25 MG/1
12.5 TABLET ORAL ONCE AS NEEDED
Status: DISCONTINUED | OUTPATIENT
Start: 2021-10-18 | End: 2021-10-18 | Stop reason: HOSPADM

## 2021-10-18 RX ORDER — GLYCOPYRROLATE 0.2 MG/ML
INJECTION INTRAMUSCULAR; INTRAVENOUS AS NEEDED
Status: DISCONTINUED | OUTPATIENT
Start: 2021-10-18 | End: 2021-10-18 | Stop reason: SURG

## 2021-10-18 RX ORDER — FLUMAZENIL 0.1 MG/ML
0.2 INJECTION INTRAVENOUS AS NEEDED
Status: DISCONTINUED | OUTPATIENT
Start: 2021-10-18 | End: 2021-10-18 | Stop reason: HOSPADM

## 2021-10-18 RX ORDER — HYDRALAZINE HYDROCHLORIDE 20 MG/ML
5 INJECTION INTRAMUSCULAR; INTRAVENOUS
Status: DISCONTINUED | OUTPATIENT
Start: 2021-10-18 | End: 2021-10-18 | Stop reason: HOSPADM

## 2021-10-18 RX ORDER — OXYCODONE HYDROCHLORIDE AND ACETAMINOPHEN 5; 325 MG/1; MG/1
1 TABLET ORAL ONCE AS NEEDED
Status: DISCONTINUED | OUTPATIENT
Start: 2021-10-18 | End: 2021-10-18 | Stop reason: HOSPADM

## 2021-10-18 RX ORDER — LIDOCAINE HYDROCHLORIDE 10 MG/ML
0.5 INJECTION, SOLUTION EPIDURAL; INFILTRATION; INTRACAUDAL; PERINEURAL ONCE AS NEEDED
Status: DISCONTINUED | OUTPATIENT
Start: 2021-10-18 | End: 2021-10-18 | Stop reason: HOSPADM

## 2021-10-18 RX ORDER — HYDROXYZINE PAMOATE 50 MG/1
50 CAPSULE ORAL ONCE
Status: COMPLETED | OUTPATIENT
Start: 2021-10-18 | End: 2021-10-18

## 2021-10-18 RX ORDER — PROPOFOL 10 MG/ML
VIAL (ML) INTRAVENOUS AS NEEDED
Status: DISCONTINUED | OUTPATIENT
Start: 2021-10-18 | End: 2021-10-18 | Stop reason: SURG

## 2021-10-18 RX ORDER — PROMETHAZINE HYDROCHLORIDE 25 MG/1
25 SUPPOSITORY RECTAL ONCE AS NEEDED
Status: DISCONTINUED | OUTPATIENT
Start: 2021-10-18 | End: 2021-10-18 | Stop reason: HOSPADM

## 2021-10-18 RX ORDER — OXYCODONE AND ACETAMINOPHEN 10; 325 MG/1; MG/1
1 TABLET ORAL EVERY 4 HOURS PRN
Status: DISCONTINUED | OUTPATIENT
Start: 2021-10-18 | End: 2021-10-18 | Stop reason: HOSPADM

## 2021-10-18 RX ORDER — ONDANSETRON 2 MG/ML
4 INJECTION INTRAMUSCULAR; INTRAVENOUS ONCE AS NEEDED
Status: DISCONTINUED | OUTPATIENT
Start: 2021-10-18 | End: 2021-10-18 | Stop reason: HOSPADM

## 2021-10-18 RX ORDER — CYCLOBENZAPRINE HCL 5 MG
5 TABLET ORAL EVERY 8 HOURS PRN
Qty: 30 TABLET | Refills: 0 | Status: SHIPPED | OUTPATIENT
Start: 2021-10-18 | End: 2023-01-06

## 2021-10-18 RX ORDER — FENTANYL CITRATE 50 UG/ML
50 INJECTION, SOLUTION INTRAMUSCULAR; INTRAVENOUS
Status: DISCONTINUED | OUTPATIENT
Start: 2021-10-18 | End: 2021-10-18 | Stop reason: HOSPADM

## 2021-10-18 RX ORDER — PROMETHAZINE HYDROCHLORIDE 25 MG/1
25 TABLET ORAL ONCE AS NEEDED
Status: DISCONTINUED | OUTPATIENT
Start: 2021-10-18 | End: 2021-10-18 | Stop reason: HOSPADM

## 2021-10-18 RX ORDER — PROMETHAZINE HYDROCHLORIDE 12.5 MG/1
12.5 TABLET ORAL EVERY 6 HOURS PRN
Qty: 20 TABLET | Refills: 0 | Status: SHIPPED | OUTPATIENT
Start: 2021-10-18 | End: 2023-01-06

## 2021-10-18 RX ORDER — FAMOTIDINE 10 MG/ML
20 INJECTION, SOLUTION INTRAVENOUS ONCE
Status: COMPLETED | OUTPATIENT
Start: 2021-10-18 | End: 2021-10-18

## 2021-10-18 RX ORDER — SODIUM CHLORIDE, SODIUM LACTATE, POTASSIUM CHLORIDE, CALCIUM CHLORIDE 600; 310; 30; 20 MG/100ML; MG/100ML; MG/100ML; MG/100ML
9 INJECTION, SOLUTION INTRAVENOUS CONTINUOUS
Status: DISCONTINUED | OUTPATIENT
Start: 2021-10-18 | End: 2021-10-18 | Stop reason: HOSPADM

## 2021-10-18 RX ORDER — HYDROMORPHONE HYDROCHLORIDE 1 MG/ML
0.5 INJECTION, SOLUTION INTRAMUSCULAR; INTRAVENOUS; SUBCUTANEOUS
Status: DISCONTINUED | OUTPATIENT
Start: 2021-10-18 | End: 2021-10-18 | Stop reason: HOSPADM

## 2021-10-18 RX ORDER — ROCURONIUM BROMIDE 10 MG/ML
INJECTION, SOLUTION INTRAVENOUS AS NEEDED
Status: DISCONTINUED | OUTPATIENT
Start: 2021-10-18 | End: 2021-10-18 | Stop reason: SURG

## 2021-10-18 RX ORDER — SCOLOPAMINE TRANSDERMAL SYSTEM 1 MG/1
1 PATCH, EXTENDED RELEASE TRANSDERMAL ONCE
Status: DISCONTINUED | OUTPATIENT
Start: 2021-10-18 | End: 2021-10-18 | Stop reason: HOSPADM

## 2021-10-18 RX ADMIN — ROCURONIUM BROMIDE 50 MG: 50 INJECTION INTRAVENOUS at 12:12

## 2021-10-18 RX ADMIN — HYDROMORPHONE HYDROCHLORIDE 0.5 MG: 1 INJECTION, SOLUTION INTRAMUSCULAR; INTRAVENOUS; SUBCUTANEOUS at 16:20

## 2021-10-18 RX ADMIN — CYCLOBENZAPRINE 10 MG: 10 TABLET, FILM COATED ORAL at 11:07

## 2021-10-18 RX ADMIN — FAMOTIDINE 20 MG: 10 INJECTION INTRAVENOUS at 11:25

## 2021-10-18 RX ADMIN — ACETAMINOPHEN 975 MG: 325 TABLET, FILM COATED ORAL at 11:07

## 2021-10-18 RX ADMIN — CLINDAMYCIN PHOSPHATE 900 MG: 900 INJECTION, SOLUTION INTRAVENOUS at 12:00

## 2021-10-18 RX ADMIN — SODIUM CHLORIDE, POTASSIUM CHLORIDE, SODIUM LACTATE AND CALCIUM CHLORIDE: 600; 310; 30; 20 INJECTION, SOLUTION INTRAVENOUS at 13:39

## 2021-10-18 RX ADMIN — GLYCOPYRROLATE 0.4 MG: 0.2 INJECTION INTRAMUSCULAR; INTRAVENOUS at 14:35

## 2021-10-18 RX ADMIN — MIDAZOLAM 0.5 MG: 1 INJECTION INTRAMUSCULAR; INTRAVENOUS at 11:25

## 2021-10-18 RX ADMIN — FENTANYL CITRATE 50 MCG: 0.05 INJECTION, SOLUTION INTRAMUSCULAR; INTRAVENOUS at 12:30

## 2021-10-18 RX ADMIN — SCOLOPAMINE TRANSDERMAL SYSTEM 1 PATCH: 1 PATCH, EXTENDED RELEASE TRANSDERMAL at 11:07

## 2021-10-18 RX ADMIN — HYDROMORPHONE HYDROCHLORIDE 0.5 MG: 1 INJECTION, SOLUTION INTRAMUSCULAR; INTRAVENOUS; SUBCUTANEOUS at 16:37

## 2021-10-18 RX ADMIN — SODIUM CHLORIDE 250 MG: 9 INJECTION, SOLUTION INTRAVENOUS at 10:35

## 2021-10-18 RX ADMIN — LIDOCAINE HYDROCHLORIDE 60 MG: 20 INJECTION, SOLUTION INFILTRATION; PERINEURAL at 12:12

## 2021-10-18 RX ADMIN — CYCLOBENZAPRINE 5 MG: 10 TABLET, FILM COATED ORAL at 16:15

## 2021-10-18 RX ADMIN — HYDROXYZINE PAMOATE 50 MG: 50 CAPSULE ORAL at 11:06

## 2021-10-18 RX ADMIN — SODIUM CHLORIDE, POTASSIUM CHLORIDE, SODIUM LACTATE AND CALCIUM CHLORIDE 9 ML/HR: 600; 310; 30; 20 INJECTION, SOLUTION INTRAVENOUS at 10:53

## 2021-10-18 RX ADMIN — FENTANYL CITRATE 50 MCG: 50 INJECTION INTRAMUSCULAR; INTRAVENOUS at 15:42

## 2021-10-18 RX ADMIN — FENTANYL CITRATE 50 MCG: 0.05 INJECTION, SOLUTION INTRAMUSCULAR; INTRAVENOUS at 12:12

## 2021-10-18 RX ADMIN — OXYCODONE AND ACETAMINOPHEN 1 TABLET: 5; 325 TABLET ORAL at 16:25

## 2021-10-18 RX ADMIN — PROPOFOL 50 MG: 10 INJECTION, EMULSION INTRAVENOUS at 12:30

## 2021-10-18 RX ADMIN — NEOSTIGMINE METHYLSULFATE 2 MG: 0.5 INJECTION INTRAVENOUS at 14:35

## 2021-10-18 RX ADMIN — PROPOFOL 150 MG: 10 INJECTION, EMULSION INTRAVENOUS at 12:12

## 2021-10-18 RX ADMIN — HYDROMORPHONE HYDROCHLORIDE 0.5 MG: 1 INJECTION, SOLUTION INTRAMUSCULAR; INTRAVENOUS; SUBCUTANEOUS at 17:03

## 2021-10-18 RX ADMIN — CELECOXIB 400 MG: 200 CAPSULE ORAL at 11:07

## 2021-10-18 RX ADMIN — ONDANSETRON 4 MG: 2 INJECTION INTRAMUSCULAR; INTRAVENOUS at 14:30

## 2021-10-18 RX ADMIN — DOCUSATE SODIUM 100 MG: 100 CAPSULE, LIQUID FILLED ORAL at 16:15

## 2021-10-18 NOTE — DISCHARGE INSTRUCTIONS
-She may remove the Ace wraps and shower on Wednesday or Thursday leave all of the Tegaderms intact and do not remove them  -She may wear a June with a shelf bra or a light sports bra after removing the Ace wraps and having a shower her follow-up appointment is with me Friday morning at 830 I will remove the Tegaderms      Scopolamine Patch  This patch has been applied to the skin behind one of your ears.  It may stay in place up to 24 hours. You may remove it at any time after your surgery; however, it should be removed after you are up and walking around the next day.  This medicine reduces stomach upset. Side effects may include: dry mouth, dizziness, sleepiness, constipation, or upset stomach.  An allergy would show up as: a rash, itching, wheezing or shortness of breath.  Follow these instructions:  1. Do not drink alcohol, drive or operate machinery while taking this medicine.  2. Wear only 1 patch at a time. You can leave the patch on for up to 24 hours.  3. When you remove the patch, fold it in half with the sticky sides together and throw it away. Wash your hands and the area under the patch.  4. Do not touch your eye with your hand if it has touched the patch.  5. Wash your hands well before and after touching the patch.  6. Sit or stand slowly to avoid dizziness.  Call your doctor if you have:  1. Any sign of allergy  2. No relief  3. Trouble passing urine  4. Any new or severe symptoms        OUTPATIENT BREAST SURGERY DISCHARGE INSTRUCTIONS    Dr. GLENROY Suresh  [address] [facility]  [phone]    1.  Remove Scopolamine patch from behind ear today as soon as you get home.    2.  No Aspirin, Advil, Motrin, Nuprin, Ibuprofen, Aleve for 3 weeks.    3.  If you have a drain, empty and record 3 times a day. Milk tubing.    4.  N/A    5.  Leave any Plastic Tegaderms ON.   DO NOT REMOVE UNLESS WATER GETS BENEATH DRESSING.    6.  May shower in 3 days.  Remove Ace wraps in 3 days.    7.  Ice Packs to upper chest only.   No heating pads.    8.  No tight bras. A soft laney is okay.      9.  Use stool softener while on pain pills and laxative as needed.  Please take OTC probiotic daily.    10. Arms at sides at rest.    11. No vigorous activity for 3 weeks.    12.  No smoking, nicotine, or secondhand smoke.    13.  Please see Dr Suresh in office on Friday AM    14.  Office number - [phone] (it is best to call early in the day during office hours if questions or problems).  If emergency, go to emergency room.    15.  REST!       Dr Suresh's cell (TEXT ONLY) [phone] text only

## 2021-10-18 NOTE — ANESTHESIA PREPROCEDURE EVALUATION
Anesthesia Evaluation     Patient summary reviewed and Nursing notes reviewed   NPO Solid Status: > 8 hours  NPO Liquid Status: > 2 hours           Airway   Mallampati: II  TM distance: >3 FB  Neck ROM: full  no difficulty expected  Dental - normal exam     Pulmonary - normal exam   (+) COPD, asthma,  Cardiovascular - normal exam    (+) hypertension, dysrhythmias, hyperlipidemia,       Neuro/Psych  GI/Hepatic/Renal/Endo    (+)  GERD,      Musculoskeletal     Abdominal  - normal exam   Substance History      OB/GYN          Other   arthritis,                      Anesthesia Plan    ASA 3     general     intravenous induction     Anesthetic plan, all risks, benefits, and alternatives have been provided, discussed and informed consent has been obtained with: patient.

## 2021-10-18 NOTE — OP NOTE
Preoperative diagnosis: Bilateral breast ptosis, capsular contracture, desire to remove silicone gel implants  Postoperative diagnosis: Same  Procedure: Removal of bilateral silicone gel implants Allergan 330 cc gel smooth implants, bilateral mastopexy  Surgeon: Umesh Suresh MD  Anesthesia: General endotracheal  Estimated blood loss 20 cc  Complications none apparent  Drains x2  Indications for procedure: This patient had originally saline implants and these were replaced with silicone gel implants by Dr. Darrell Ojeda many years ago.  She is developed some capsular contracture left worse than right and desires removal of her silicone gel implants and does not want to worry about silicone gel at this point she has had mammograms which were normal did not suggest any rupture she has some ptosis of the bilateral breast as well she understands risk benefits and complications and is reviewed the informed consents from the [employer] of plastic surgeons and agrees to proceed and is ready she is marked prior to surgery with a modified Wise pattern.  Procedure: Patient's brought the operating room placed upper table supine position.  Satisfactory general endotracheal anesthesia achieved without difficulty.  We injected dilute local anesthesia around the periphery of her breast telling staying well away from the implants she was prepped and draped in a sterile fashion and we de-epithelialized around 42 mm nipple areolar complexes and within our modified Cohen pattern we then made a transverse incision just beneath the inferior design of our Wise pattern flaps transversely and dissected down through subcutaneous tissue and breast tissue until we encountered the capsule we removed the implant on the right side it was an Allergan 330 cc gel device smooth capsule no evidence of any rupture or gel bleed.  On the left side capsule was a little thicker but not much so the implant was grossly intact it did have a certain  stickiness to it suggestive of a very early gel bleed.  And it was an Allergan 330 silicone gel implant both pockets were carefully wiped out with laparotomy sponges and irrigated with antibiotic containing saline and dilute Betadine with multiple wipes with laparotomy sponges and irrigation with antibiotic containing saline and Betadine.  We then cauterized in multiple locations the capsules throughout on both sides we irrigated again hemostasis was excellent we placed 15 [place] drain on each side and secured with a nylon suture we advanced the inferior lead based dermal glandular flap superiorly into the pocket and secured it with a few interrupted 2-0 Vicryl sutures we then scored along the outer edges of our modified Wise pattern to release the nipple areolar complex on a superior central pedicle advancing this into its new location bringing the incisions together with temporary surgical staples excellent hemostasis was maintained there is excellent circulation and no undue tension on the closure staples were removed and replaced with multiple 3-0 Monocryl's and 4-0 Vicryls we then placed a subcuticular layer of 5-0 PDS around the nipples and vertically and then a 4-0 PDS along the inframammary crease on each side needle and sponge counts were correct x2 intraoperatively we injected 15 cc of quarter percent Marcaine with epinephrine for additional postoperative analgesia.  Incisions were treated with Exofin skin adhesive and allowed to dry and then gauze and Tegaderms applied and then Biopatch gauze and Tegaderm at the drain exit sites she tolerated the procedure well and with recovery in satisfactory condition needle and sponge counts were correct x2 all precautions regarding COVID-19 were followed and per hospital policy the silicone gel implants were sent for gross only pathology.  The patient was wrapped with ABD pads and 6 inch Ace wrap for compressive dressing and with recovery in satisfactory  condition.

## 2021-10-18 NOTE — ANESTHESIA PROCEDURE NOTES
Airway  Urgency: elective    Date/Time: 10/18/2021 12:13 PM  Airway not difficult    General Information and Staff    Patient location during procedure: OR  Anesthesiologist: Tk Rey MD  CRNA: Alex Perea CRNA    Indications and Patient Condition  Indications for airway management: airway protection    Preoxygenated: yes  MILS maintained throughout  Mask difficulty assessment: 1 - vent by mask    Final Airway Details  Final airway type: endotracheal airway      Successful airway: ETT  Cuffed: yes   Successful intubation technique: direct laryngoscopy  Endotracheal tube insertion site: oral  Blade: Adria  Blade size: 3  ETT size (mm): 7.0  Cormack-Lehane Classification: grade I - full view of glottis  Placement verified by: chest auscultation and capnometry   Measured from: lips  ETT/EBT  to lips (cm): 20  Number of attempts at approach: 1  Assessment: lips, teeth, and gum same as pre-op and atraumatic intubation

## 2021-10-18 NOTE — ANESTHESIA POSTPROCEDURE EVALUATION
"Patient: Ida Vincent    Procedure Summary     Date: 10/18/21 Room / Location: [facility] OR  / [facility] MAIN OR    Anesthesia Start: 1207 Anesthesia Stop: 1511    Procedures:       BILATERAL MASTOPEXY (Bilateral Breast)      BILATERAL REMOVAL OF IMPLANTS (Bilateral Breast) Diagnosis:     Surgeons: BRUCE Suresh MD Provider: Tk Rey MD    Anesthesia Type: general ASA Status: 3          Anesthesia Type: general    Vitals  Vitals Value Taken Time   /66 10/18/21 1711   Temp 36.9 °C (98.4 °F) 10/18/21 1710   Pulse 80 10/18/21 1721   Resp 16 10/18/21 1710   SpO2 87 % 10/18/21 1722   Vitals shown include unvalidated device data.        Post Anesthesia Care and Evaluation    Patient location during evaluation: bedside  Patient participation: complete - patient participated  Level of consciousness: awake and alert  Pain management: adequate  Airway patency: patent  Anesthetic complications: No anesthetic complications    Cardiovascular status: acceptable  Respiratory status: acceptable  Hydration status: acceptable    Comments: /65   Pulse 76   Temp 36.9 °C (98.4 °F) (Oral)   Resp 16   Ht 165.1 cm (65\")   Wt 51.8 kg (114 lb 3.2 oz)   SpO2 96%   BMI 19.00 kg/m²     Patient is stable postoperatively and has adequately recovered from anesthesia as described above unless otherwise noted      "

## 2021-10-20 LAB
LAB AP CASE REPORT: NORMAL
PATH REPORT.FINAL DX SPEC: NORMAL
PATH REPORT.GROSS SPEC: NORMAL

## 2021-12-07 ENCOUNTER — OFFICE VISIT (OUTPATIENT)
Dept: GASTROENTEROLOGY | Facility: CLINIC | Age: 61
End: 2021-12-07

## 2021-12-07 VITALS
BODY MASS INDEX: 18.89 KG/M2 | HEART RATE: 70 BPM | DIASTOLIC BLOOD PRESSURE: 52 MMHG | HEIGHT: 65 IN | SYSTOLIC BLOOD PRESSURE: 125 MMHG | WEIGHT: 113.4 LBS

## 2021-12-07 DIAGNOSIS — K59.04 CHRONIC IDIOPATHIC CONSTIPATION: Primary | ICD-10-CM

## 2021-12-07 DIAGNOSIS — K21.00 GASTROESOPHAGEAL REFLUX DISEASE WITH ESOPHAGITIS WITHOUT HEMORRHAGE: ICD-10-CM

## 2021-12-07 DIAGNOSIS — R11.0 NAUSEA: ICD-10-CM

## 2021-12-07 PROBLEM — E78.5 HYPERLIPEMIA: Status: ACTIVE | Noted: 2021-12-07

## 2021-12-07 PROBLEM — J45.909 ASTHMA: Status: ACTIVE | Noted: 2021-12-07

## 2021-12-07 PROBLEM — E07.9 THYROID DISORDER: Status: ACTIVE | Noted: 2021-12-07

## 2021-12-07 PROCEDURE — 99214 OFFICE O/P EST MOD 30 MIN: CPT | Performed by: NURSE PRACTITIONER

## 2021-12-07 RX ORDER — ONDANSETRON HYDROCHLORIDE 8 MG/1
8 TABLET, FILM COATED ORAL EVERY 8 HOURS PRN
Qty: 30 TABLET | Refills: 5 | Status: SHIPPED | OUTPATIENT
Start: 2021-12-07

## 2021-12-07 RX ORDER — PANTOPRAZOLE SODIUM 40 MG/1
40 TABLET, DELAYED RELEASE ORAL DAILY
Qty: 90 TABLET | Refills: 1 | Status: SHIPPED | OUTPATIENT
Start: 2021-12-07 | End: 2022-03-07

## 2021-12-07 NOTE — PROGRESS NOTES
Chief Complaint  Constipation    Ida Vincent is a 61 y.o. female who presents to [facility] for follow-up of constipation.      History of present Illness  She is no longer taking linzess due to diarrhea.  She is now using colace daily.  Having a daily bowel movement.      Reports compliance with Protonix and feels that it's effective.  Still having episodes of vomiting.  Sometimes this occurs following taking medications.      Past Medical History:   Diagnosis Date   • Arthritis    • Asthma    • COPD (chronic obstructive pulmonary disease) (HCC)    • DDD (degenerative disc disease), lumbar    • Fibromyalgia    • GERD (gastroesophageal reflux disease)    • Heart block    • History of diverticulosis    • History of palpitations    • Hyperlipemia    • Hypertension    • Low back pain    • Lumbago    • Premature ventricular contractions (PVCs) (VPCs)     HISTORY OF   • Seasonal allergies    • Slow to wake up after anesthesia    • Thyroid disorder        Past Surgical History:   Procedure Laterality Date   • ABDOMINOPLASTY     • ATRIAL SEPTAL DEFECT REPAIR     • BREAST AUGMENTATION     • BREAST IMPLANT SURGERY Bilateral 10/18/2021    Procedure: BILATERAL REMOVAL OF IMPLANTS;  Surgeon: BRUCE Suresh MD;  Location: [facility];  Service: Plastics;  Laterality: Bilateral;   • CARDIAC CATHETERIZATION     • COLONOSCOPY  2011   • DENTAL PROCEDURE     • ENDOSCOPY  2011   • GALLBLADDER SURGERY     • HYSTERECTOMY     • MASTOPEXY Bilateral 10/18/2021    Procedure: BILATERAL MASTOPEXY;  Surgeon: BRUCE Suresh MD;  Location: [facility];  Service: Plastics;  Laterality: Bilateral;   • OSTEOTOMY     • OTHER SURGICAL HISTORY      metal implants    • OTHER SURGICAL HISTORY      surgical clips RIGHT FACE ARTERY WITH JAW SURGERY   • SHOULDER ARTHROSCOPIC ACROMIOCLAVICULAR (AC) JOINT RECONSTRUCTION Right    • TONSILLECTOMY     • VERTEBROPLASTY  06/04/2021    Lumbar 2         Current  Outpatient Medications:   •  albuterol sulfate HFA (ProAir HFA) 108 (90 Base) MCG/ACT inhaler, Inhale 2 puffs Every 4 (Four) Hours As Needed., Disp: , Rfl:   •  alendronate (FOSAMAX) 35 MG tablet, Take 35 mg by mouth Every 7 (Seven) Days. WEDNESDAYS.  HOLDING FOR SURGERY, Disp: , Rfl:   •  cetirizine (zyrTEC) 10 MG tablet, Take 10 mg by mouth Every Night., Disp: , Rfl:   •  cyclobenzaprine (FLEXERIL) 5 MG tablet, Take 1 tablet by mouth Every 8 (Eight) to 12 (Twelve) Hours As Needed., Disp: 30 tablet, Rfl: 0  •  levothyroxine (Synthroid) 50 MCG tablet, Take 50 mcg by mouth Every Morning., Disp: , Rfl:   •  metoprolol succinate XL (TOPROL-XL) 25 MG 24 hr tablet, Take 25 mg by mouth 2 (two) times a day., Disp: , Rfl:   •  metoprolol succinate XL (TOPROL-XL) 50 MG 24 hr tablet, Take 50 mg by mouth 2 (two) times a day., Disp: , Rfl:   •  ondansetron (ZOFRAN) 8 MG tablet, Take 1 tablet by mouth Every 8 (Eight) Hours As Needed for Nausea or Vomiting., Disp: 30 tablet, Rfl: 5  •  oxyCODONE-acetaminophen (Percocet)  MG per tablet, Take 0.5-1 tablets by mouth Every 4 (Four) to 6 (Six) Hours As Needed for pain, Disp: 20 tablet, Rfl: 0  •  pantoprazole (Protonix) 40 MG EC tablet, Take 1 tablet by mouth Daily for 90 days., Disp: 90 tablet, Rfl: 1  •  promethazine (PHENERGAN) 12.5 MG tablet, Take 1 tablet by mouth Every 6 (Six) Hours As Needed for Nausea or Vomiting., Disp: 20 tablet, Rfl: 0  •  QUEtiapine (SEROquel) 25 MG tablet, Take 50 mg by mouth Every Night., Disp: , Rfl:   •  rosuvastatin (CRESTOR) 40 MG tablet, Take 40 mg by mouth Every Night., Disp: , Rfl:   •  traZODone (DESYREL) 100 MG tablet, Take 200 mg by mouth Every Night., Disp: , Rfl:   •  Trelegy Ellipta 100-62.5-25 MCG/INH inhaler, Inhale 1 puff Every Night., Disp: , Rfl:   •  Vortioxetine HBr (Trintellix) 10 MG tablet, Take 1 tablet by mouth 2 (Two) Times a Week., Disp: , Rfl:      Allergies   Allergen Reactions   • Ciprofloxacin Anaphylaxis and Other  "(See Comments)   • Benadryl [Diphenhydramine] Other (See Comments)     \"It makes me climb the walls and shake\"   • Erythromycin Nausea And Vomiting   • Prednisone Other (See Comments)     CHF  Pt stated solu-medrol is okay to take   • Thimerosal Swelling       Family History   Problem Relation Age of Onset   • Diabetes Mother    • Hypertension Mother    • Anxiety disorder Mother    • Arthritis Mother    • Mental illness Sister    • Arthritis Sister    • Early death Brother    • Diabetes Brother    • Arthritis Brother    • Cancer Father    • Arthritis Father    • Heart disease Daughter    • Malig Hyperthermia Neg Hx         Social History     Social History Narrative   • Not on file       Objective     Review of Systems     Vital Signs:   /52 (BP Location: Left arm, Patient Position: Sitting, Cuff Size: Adult)   Pulse 70   Ht 165.1 cm (65\")   Wt 51.4 kg (113 lb 6.4 oz)   BMI 18.87 kg/m²       Physical Exam  Constitutional:       General: She is not in acute distress.     Appearance: Normal appearance. She is well-developed and normal weight.   HENT:      Head: Normocephalic and atraumatic.   Eyes:      Conjunctiva/sclera: Conjunctivae normal.      Pupils: Pupils are equal, round, and reactive to light.      Visual Fields: Right eye visual fields normal and left eye visual fields normal.   Cardiovascular:      Rate and Rhythm: Normal rate and regular rhythm.      Heart sounds: Normal heart sounds.   Pulmonary:      Effort: Pulmonary effort is normal. No retractions.      Breath sounds: Normal breath sounds and air entry.   Abdominal:      General: Bowel sounds are normal.      Palpations: Abdomen is soft.      Tenderness: There is no abdominal tenderness.      Comments: No appreciable hepatosplenomegaly or ascites   Musculoskeletal:         General: Normal range of motion.      Cervical back: Neck supple.      Right lower leg: No edema.      Left lower leg: No edema.   Lymphadenopathy:      Cervical: No " cervical adenopathy.   Skin:     General: Skin is warm and dry.      Findings: No lesion.   Neurological:      General: No focal deficit present.      Mental Status: She is alert and oriented to person, place, and time.   Psychiatric:         Mood and Affect: Mood and affect normal.         Behavior: Behavior normal.         Result Review :   The following data was reviewed by: BRYANT Vickers on 12/07/2021:  CBC w/diff    CBC w/Diff 10/15/21   WBC 5.92   RBC 4.27   Hemoglobin 12.9   Hematocrit 39.1   MCV 91.6   MCH 30.2   MCHC 33.0   RDW 12.3   Platelets 199           CMP    CMP 10/15/21   Glucose 68   BUN 7   Creatinine 0.58   eGFR Non African Am 106   Sodium 139   Potassium 4.4   Chloride 98   Calcium 9.4                       Assessment and Plan    Diagnoses and all orders for this visit:    1. Chronic idiopathic constipation (Primary)    2. Nausea  -     ondansetron (ZOFRAN) 8 MG tablet; Take 1 tablet by mouth Every 8 (Eight) Hours As Needed for Nausea or Vomiting.  Dispense: 30 tablet; Refill: 5  -     pantoprazole (Protonix) 40 MG EC tablet; Take 1 tablet by mouth Daily for 90 days.  Dispense: 90 tablet; Refill: 1    3. Gastroesophageal reflux disease with esophagitis without hemorrhage  -     ondansetron (ZOFRAN) 8 MG tablet; Take 1 tablet by mouth Every 8 (Eight) Hours As Needed for Nausea or Vomiting.  Dispense: 30 tablet; Refill: 5  -     pantoprazole (Protonix) 40 MG EC tablet; Take 1 tablet by mouth Daily for 90 days.  Dispense: 90 tablet; Refill: 1        Continue current medication regimen as patient feels that symptoms are improved.  Refill sent.      Follow Up   Return in about 6 months (around 6/7/2022) for GERD, constipation.  Patient was given instructions and counseling regarding her condition or for health maintenance advice. Please see specific information pulled into the AVS if appropriate.

## 2022-06-13 ENCOUNTER — TRANSCRIBE ORDERS (OUTPATIENT)
Dept: ADMINISTRATIVE | Facility: HOSPITAL | Age: 62
End: 2022-06-13

## 2022-06-13 DIAGNOSIS — Z12.31 SCREENING MAMMOGRAM, ENCOUNTER FOR: Primary | ICD-10-CM

## 2022-08-05 ENCOUNTER — HOSPITAL ENCOUNTER (OUTPATIENT)
Dept: MAMMOGRAPHY | Facility: HOSPITAL | Age: 62
Discharge: HOME | End: 2022-08-05
Admitting: NURSE PRACTITIONER

## 2022-08-05 DIAGNOSIS — Z12.31 SCREENING MAMMOGRAM, ENCOUNTER FOR: ICD-10-CM

## 2022-08-05 PROCEDURE — 77063 BREAST TOMOSYNTHESIS BI: CPT

## 2022-08-05 PROCEDURE — 77067 SCR MAMMO BI INCL CAD: CPT

## 2023-01-06 ENCOUNTER — OFFICE VISIT (OUTPATIENT)
Dept: FAMILY MEDICINE CLINIC | Facility: CLINIC | Age: 63
End: 2023-01-06
Payer: MEDICARE

## 2023-01-06 VITALS
BODY MASS INDEX: 16.43 KG/M2 | OXYGEN SATURATION: 96 % | WEIGHT: 98.6 LBS | TEMPERATURE: 98.1 F | HEART RATE: 60 BPM | DIASTOLIC BLOOD PRESSURE: 54 MMHG | SYSTOLIC BLOOD PRESSURE: 90 MMHG | HEIGHT: 65 IN

## 2023-01-06 DIAGNOSIS — Z76.89 ENCOUNTER TO ESTABLISH CARE: Primary | ICD-10-CM

## 2023-01-06 DIAGNOSIS — K21.9 GASTROESOPHAGEAL REFLUX DISEASE WITHOUT ESOPHAGITIS: ICD-10-CM

## 2023-01-06 DIAGNOSIS — M81.0 AGE RELATED OSTEOPOROSIS, UNSPECIFIED PATHOLOGICAL FRACTURE PRESENCE: ICD-10-CM

## 2023-01-06 DIAGNOSIS — J44.9 CHRONIC OBSTRUCTIVE PULMONARY DISEASE, UNSPECIFIED COPD TYPE: ICD-10-CM

## 2023-01-06 DIAGNOSIS — E78.5 HYPERLIPIDEMIA, UNSPECIFIED HYPERLIPIDEMIA TYPE: ICD-10-CM

## 2023-01-06 DIAGNOSIS — F17.210 CIGARETTE NICOTINE DEPENDENCE WITHOUT COMPLICATION: ICD-10-CM

## 2023-01-06 DIAGNOSIS — E03.9 ACQUIRED HYPOTHYROIDISM: ICD-10-CM

## 2023-01-06 DIAGNOSIS — Z11.59 ENCOUNTER FOR HEPATITIS C SCREENING TEST FOR LOW RISK PATIENT: ICD-10-CM

## 2023-01-06 LAB
ALBUMIN SERPL-MCNC: 4.8 G/DL (ref 3.5–5.2)
ALBUMIN/GLOB SERPL: 1.9 G/DL
ALP SERPL-CCNC: 59 U/L (ref 39–117)
ALT SERPL W P-5'-P-CCNC: 12 U/L (ref 1–33)
ANION GAP SERPL CALCULATED.3IONS-SCNC: 9.1 MMOL/L (ref 5–15)
AST SERPL-CCNC: 16 U/L (ref 1–32)
BASOPHILS # BLD AUTO: 0.04 10*3/MM3 (ref 0–0.2)
BASOPHILS NFR BLD AUTO: 0.5 % (ref 0–1.5)
BILIRUB SERPL-MCNC: 0.4 MG/DL (ref 0–1.2)
BUN SERPL-MCNC: 4 MG/DL (ref 8–23)
BUN/CREAT SERPL: 6 (ref 7–25)
CALCIUM SPEC-SCNC: 9.9 MG/DL (ref 8.6–10.5)
CHLORIDE SERPL-SCNC: 104 MMOL/L (ref 98–107)
CHOLEST SERPL-MCNC: 154 MG/DL (ref 0–200)
CO2 SERPL-SCNC: 27.9 MMOL/L (ref 22–29)
CREAT SERPL-MCNC: 0.67 MG/DL (ref 0.57–1)
DEPRECATED RDW RBC AUTO: 40.2 FL (ref 37–54)
EGFRCR SERPLBLD CKD-EPI 2021: 100.2 ML/MIN/1.73
EOSINOPHIL # BLD AUTO: 0.07 10*3/MM3 (ref 0–0.4)
EOSINOPHIL NFR BLD AUTO: 0.8 % (ref 0.3–6.2)
ERYTHROCYTE [DISTWIDTH] IN BLOOD BY AUTOMATED COUNT: 12.7 % (ref 12.3–15.4)
GLOBULIN UR ELPH-MCNC: 2.5 GM/DL
GLUCOSE SERPL-MCNC: 90 MG/DL (ref 65–99)
HCT VFR BLD AUTO: 41.7 % (ref 34–46.6)
HCV AB SER DONR QL: NORMAL
HDLC SERPL-MCNC: 43 MG/DL (ref 40–60)
HGB BLD-MCNC: 14 G/DL (ref 12–15.9)
IMM GRANULOCYTES # BLD AUTO: 0.05 10*3/MM3 (ref 0–0.05)
IMM GRANULOCYTES NFR BLD AUTO: 0.6 % (ref 0–0.5)
LDLC SERPL CALC-MCNC: 90 MG/DL (ref 0–100)
LDLC/HDLC SERPL: 2.03 {RATIO}
LYMPHOCYTES # BLD AUTO: 3.48 10*3/MM3 (ref 0.7–3.1)
LYMPHOCYTES NFR BLD AUTO: 41.1 % (ref 19.6–45.3)
MCH RBC QN AUTO: 29.5 PG (ref 26.6–33)
MCHC RBC AUTO-ENTMCNC: 33.6 G/DL (ref 31.5–35.7)
MCV RBC AUTO: 87.8 FL (ref 79–97)
MONOCYTES # BLD AUTO: 0.59 10*3/MM3 (ref 0.1–0.9)
MONOCYTES NFR BLD AUTO: 7 % (ref 5–12)
NEUTROPHILS NFR BLD AUTO: 4.24 10*3/MM3 (ref 1.7–7)
NEUTROPHILS NFR BLD AUTO: 50 % (ref 42.7–76)
NRBC BLD AUTO-RTO: 0 /100 WBC (ref 0–0.2)
PLATELET # BLD AUTO: 302 10*3/MM3 (ref 140–450)
PMV BLD AUTO: 11.1 FL (ref 6–12)
POTASSIUM SERPL-SCNC: 4.3 MMOL/L (ref 3.5–5.2)
PROT SERPL-MCNC: 7.3 G/DL (ref 6–8.5)
RBC # BLD AUTO: 4.75 10*6/MM3 (ref 3.77–5.28)
SODIUM SERPL-SCNC: 141 MMOL/L (ref 136–145)
T4 FREE SERPL-MCNC: 1.74 NG/DL (ref 0.93–1.7)
TRIGL SERPL-MCNC: 119 MG/DL (ref 0–150)
TSH SERPL DL<=0.05 MIU/L-ACNC: 0.93 UIU/ML (ref 0.27–4.2)
VLDLC SERPL-MCNC: 21 MG/DL (ref 5–40)
WBC NRBC COR # BLD: 8.47 10*3/MM3 (ref 3.4–10.8)

## 2023-01-06 PROCEDURE — 36415 COLL VENOUS BLD VENIPUNCTURE: CPT | Performed by: NURSE PRACTITIONER

## 2023-01-06 PROCEDURE — 86803 HEPATITIS C AB TEST: CPT | Performed by: NURSE PRACTITIONER

## 2023-01-06 PROCEDURE — 84443 ASSAY THYROID STIM HORMONE: CPT | Performed by: NURSE PRACTITIONER

## 2023-01-06 PROCEDURE — 85025 COMPLETE CBC W/AUTO DIFF WBC: CPT | Performed by: NURSE PRACTITIONER

## 2023-01-06 PROCEDURE — 99214 OFFICE O/P EST MOD 30 MIN: CPT | Performed by: NURSE PRACTITIONER

## 2023-01-06 PROCEDURE — 80061 LIPID PANEL: CPT | Performed by: NURSE PRACTITIONER

## 2023-01-06 PROCEDURE — 84439 ASSAY OF FREE THYROXINE: CPT | Performed by: NURSE PRACTITIONER

## 2023-01-06 PROCEDURE — 80053 COMPREHEN METABOLIC PANEL: CPT | Performed by: NURSE PRACTITIONER

## 2023-01-06 RX ORDER — ASPIRIN 81 MG/1
81 TABLET, CHEWABLE ORAL DAILY
COMMUNITY
End: 2023-01-06 | Stop reason: SDUPTHER

## 2023-01-06 RX ORDER — CHLORHEXIDINE GLUCONATE 0.12 MG/ML
RINSE ORAL
COMMUNITY
Start: 2022-11-28

## 2023-01-06 RX ORDER — QUETIAPINE FUMARATE 100 MG/1
100 TABLET, FILM COATED ORAL NIGHTLY
COMMUNITY

## 2023-01-06 RX ORDER — ASPIRIN 81 MG/1
81 TABLET, CHEWABLE ORAL DAILY
Qty: 90 TABLET | Refills: 3 | Status: SHIPPED | OUTPATIENT
Start: 2023-01-06

## 2023-01-06 RX ORDER — CETIRIZINE HYDROCHLORIDE 10 MG/1
10 TABLET ORAL NIGHTLY
Qty: 90 TABLET | Refills: 1 | Status: SHIPPED | OUTPATIENT
Start: 2023-01-06

## 2023-01-06 RX ORDER — PANTOPRAZOLE SODIUM 20 MG/1
20 TABLET, DELAYED RELEASE ORAL DAILY
COMMUNITY

## 2023-01-06 RX ORDER — PANTOPRAZOLE SODIUM 40 MG/1
40 TABLET, DELAYED RELEASE ORAL DAILY
COMMUNITY
End: 2023-01-06

## 2023-01-06 RX ORDER — LEVOTHYROXINE SODIUM 0.05 MG/1
50 TABLET ORAL
Qty: 90 TABLET | Refills: 1 | Status: SHIPPED | OUTPATIENT
Start: 2023-01-06

## 2023-01-06 NOTE — PROGRESS NOTES
"Chief Complaint  Establish Care and Osteoporosis    Subjective         Ida Vincent presents to [facility]  HPI   Presents today to establish care.  Previous PCP is BRYANT Kate.  She has osteopenia and osteoporosis, recovering alcoholic, COPD, smokes marijuana every night.  She is currently taking alendronate and calcium supplements for her osteoporosis.  Hypothyroidism on levothyroxine.  She has seen cardiology in the past.    Social History     Socioeconomic History   • Marital status:    Tobacco Use   • Smoking status: Every Day     Packs/day: 1.00     Years: 45.00     Pack years: 45.00     Types: Cigarettes     Last attempt to quit: 2021     Years since quittin.7   • Smokeless tobacco: Never   Vaping Use   • Vaping Use: Never used   Substance and Sexual Activity   • Alcohol use: Not Currently   • Drug use: Yes     Frequency: 7.0 times per week     Types: Marijuana     Comment: Patient states she uses marijuana every night-2022   • Sexual activity: Not Currently     Partners: Male     Birth control/protection: Vasectomy, Hysterectomy        Objective     Vitals:    23 1439   BP: 90/54   BP Location: Left arm   Patient Position: Sitting   Pulse: 60   Temp: 98.1 °F (36.7 °C)   TempSrc: Oral   SpO2: 96%   Weight: 44.7 kg (98 lb 9.6 oz)   Height: 165.1 cm (65\")        Body mass index is 16.41 kg/m².    Wt Readings from Last 3 Encounters:   23 44.7 kg (98 lb 9.6 oz)   21 51.4 kg (113 lb 6.4 oz)   10/18/21 51.8 kg (114 lb 3.2 oz)       BP Readings from Last 3 Encounters:   23 90/54   21 125/52   10/18/21 126/65         BMI is below normal parameters (malnutrition). Recommendations: treating the underlying disease process and eat high protein diet        Physical Exam  Vitals reviewed.   Constitutional:       Appearance: Normal appearance. She is well-developed.   HENT:      Head: Normocephalic and atraumatic.      Right Ear: " External ear normal.      Left Ear: External ear normal.      Mouth/Throat:      Pharynx: No oropharyngeal exudate.   Eyes:      Conjunctiva/sclera: Conjunctivae normal.      Pupils: Pupils are equal, round, and reactive to light.   Cardiovascular:      Rate and Rhythm: Normal rate and regular rhythm.      Heart sounds: No murmur heard.    No friction rub. No gallop.   Pulmonary:      Effort: Pulmonary effort is normal.      Breath sounds: Normal breath sounds. No wheezing or rhonchi.   Abdominal:      General: Bowel sounds are normal. There is no distension.      Palpations: Abdomen is soft.      Tenderness: There is no abdominal tenderness.   Skin:     General: Skin is warm and dry.   Neurological:      Mental Status: She is alert and oriented to person, place, and time.   Psychiatric:         Mood and Affect: Mood and affect normal.         Behavior: Behavior normal.         Thought Content: Thought content normal.         Judgment: Judgment normal.          Result Review :   The following data was reviewed by: BRYANT Duenas on 01/06/2023:      Procedures    Assessment and Plan   Diagnoses and all orders for this visit:    1. Encounter to establish care (Primary)    2. Chronic obstructive pulmonary disease, unspecified COPD type (HCC)  -     CBC & Differential  -     Comprehensive Metabolic Panel    3. Cigarette nicotine dependence without complication  -      CT Chest Low Dose Cancer Screening WO; Future    4. Age related osteoporosis, unspecified pathological fracture presence  -     DEXA Bone Density Axial; Future    5. Acquired hypothyroidism  -     TSH+Free T4    6. Hyperlipidemia, unspecified hyperlipidemia type  -     Lipid Panel    7. Gastroesophageal reflux disease without esophagitis    8. Encounter for hepatitis C screening test for low risk patient  -     Hepatitis C antibody    Other orders  -     aspirin 81 MG chewable tablet; Chew 1 tablet Daily.  Dispense: 90 tablet; Refill: 3  -      cetirizine (zyrTEC) 10 MG tablet; Take 1 tablet by mouth Every Night.  Dispense: 90 tablet; Refill: 1  -     levothyroxine (SYNTHROID, LEVOTHROID) 50 MCG tablet; Take 1 tablet by mouth Every Morning.  Dispense: 90 tablet; Refill: 1    Blood pressure slightly low.  Encouraged her to check her blood pressure routinely at home.  She states that recurrent metoprolol dose she has been on for years which helps her tachycardia or palpitations.  We will repeat a DEXA scan.  Check the following labs.  Continue current regimen follow-up in 1 month.        Follow Up   No follow-ups on file.  Patient was given instructions and counseling regarding her condition or for health maintenance advice. Please see specific information pulled into the AVS if appropriate.     Please note that portions of this note were completed with a voice recognition program.

## 2023-01-10 ENCOUNTER — TELEPHONE (OUTPATIENT)
Dept: FAMILY MEDICINE CLINIC | Facility: CLINIC | Age: 63
End: 2023-01-10
Payer: OTHER GOVERNMENT

## 2023-01-10 NOTE — TELEPHONE ENCOUNTER
----- Message from BRYANT Duenas sent at 1/9/2023  4:51 PM EST -----  She had made a follow-up appointment that was scheduled to Eileen Vazquez's schedule.  The follow-up should be with me

## 2023-01-11 RX ORDER — METOPROLOL SUCCINATE 25 MG/1
25 TABLET, EXTENDED RELEASE ORAL 2 TIMES DAILY
Qty: 180 TABLET | Refills: 1 | Status: SHIPPED | OUTPATIENT
Start: 2023-01-11 | End: 2023-02-28

## 2023-01-11 NOTE — TELEPHONE ENCOUNTER
Caller: Ida Vincent    Relationship: Self    Best call back number: [phone]    Requested Prescriptions:   Requested Prescriptions     Pending Prescriptions Disp Refills   • metoprolol succinate XL (TOPROL-XL) 25 MG 24 hr tablet       Sig: Take 1 tablet by mouth.        [facility]arm[facility] be sent[facility] [place] [place] [facility] [address] [place] - [phone] [facility] [phone] FX     Additional details provided by patient: PATIENT WILL NOT HAVE ENOUGH TO LAST UNTIL NEXT APPOINTMENT.    Does the patient have less than a 3 day supply:  [] Yes  [x] No    Would you like a call back once the refill request has been completed: [x] Yes [] No    If the office needs to give you a call back, can they leave a voicemail: [x] Yes [] No     Earlene Dee Rep   01/11/23 12:29 EST

## 2023-01-12 ENCOUNTER — PATIENT ROUNDING (BHMG ONLY) (OUTPATIENT)
Dept: FAMILY MEDICINE CLINIC | Facility: CLINIC | Age: 63
End: 2023-01-12
Payer: MEDICARE

## 2023-01-12 NOTE — PROGRESS NOTES
January 12, 2023    Hello, may I speak with Ida Vincent?    My name is Timi Amaya    I am  with [facility]  [address]  [address]  [place] [address]  [phone].    Before we get started may I verify your date of birth? 1960    I am calling to officially welcome you to our practice and ask about your recent visit. Is this a good time to talk? yes    Tell me about your visit with us. What things went well?  There was a delay but I was informed regarding the delay and I was ok with it. I will say you guys are very attentive to your pt. That is greatly appreciated.       We're always looking for ways to make our patients' experiences even better. Do you have recommendations on ways we may improve?  no    Overall were you satisfied with your first visit to our practice? yes       I appreciate you taking the time to speak with me today. Is there anything else I can do for you? no      Thank you, and have a great day.

## 2023-01-30 ENCOUNTER — HOSPITAL ENCOUNTER (OUTPATIENT)
Dept: CT IMAGING | Facility: HOSPITAL | Age: 63
Discharge: HOME | End: 2023-01-30
Payer: MEDICARE

## 2023-01-30 ENCOUNTER — HOSPITAL ENCOUNTER (OUTPATIENT)
Dept: BONE DENSITY | Facility: HOSPITAL | Age: 63
Discharge: HOME | End: 2023-01-30
Payer: MEDICARE

## 2023-01-30 DIAGNOSIS — F17.210 CIGARETTE NICOTINE DEPENDENCE WITHOUT COMPLICATION: ICD-10-CM

## 2023-01-30 DIAGNOSIS — M81.0 AGE RELATED OSTEOPOROSIS, UNSPECIFIED PATHOLOGICAL FRACTURE PRESENCE: ICD-10-CM

## 2023-01-30 PROCEDURE — 77080 DXA BONE DENSITY AXIAL: CPT

## 2023-01-30 PROCEDURE — 71271 CT THORAX LUNG CANCER SCR C-: CPT

## 2023-02-07 ENCOUNTER — OFFICE VISIT (OUTPATIENT)
Dept: FAMILY MEDICINE CLINIC | Facility: CLINIC | Age: 63
End: 2023-02-07
Payer: MEDICARE

## 2023-02-07 VITALS
BODY MASS INDEX: 16.19 KG/M2 | HEART RATE: 57 BPM | OXYGEN SATURATION: 97 % | TEMPERATURE: 97.6 F | DIASTOLIC BLOOD PRESSURE: 62 MMHG | SYSTOLIC BLOOD PRESSURE: 84 MMHG | HEIGHT: 65 IN | WEIGHT: 97.2 LBS

## 2023-02-07 DIAGNOSIS — I44.0 PROLONGED P-R INTERVAL: ICD-10-CM

## 2023-02-07 DIAGNOSIS — M81.0 AGE-RELATED OSTEOPOROSIS WITHOUT CURRENT PATHOLOGICAL FRACTURE: ICD-10-CM

## 2023-02-07 DIAGNOSIS — L98.9 SKIN LESION: ICD-10-CM

## 2023-02-07 DIAGNOSIS — R00.2 PALPITATIONS: ICD-10-CM

## 2023-02-07 DIAGNOSIS — I25.10 CORONARY ARTERY CALCIFICATION: ICD-10-CM

## 2023-02-07 DIAGNOSIS — J44.9 CHRONIC OBSTRUCTIVE PULMONARY DISEASE, UNSPECIFIED COPD TYPE: ICD-10-CM

## 2023-02-07 DIAGNOSIS — Z00.00 MEDICARE ANNUAL WELLNESS VISIT, SUBSEQUENT: Primary | ICD-10-CM

## 2023-02-07 DIAGNOSIS — I25.84 CORONARY ARTERY CALCIFICATION: ICD-10-CM

## 2023-02-07 PROCEDURE — 36415 COLL VENOUS BLD VENIPUNCTURE: CPT | Performed by: NURSE PRACTITIONER

## 2023-02-07 PROCEDURE — 1160F RVW MEDS BY RX/DR IN RCRD: CPT | Performed by: NURSE PRACTITIONER

## 2023-02-07 PROCEDURE — 1125F AMNT PAIN NOTED PAIN PRSNT: CPT | Performed by: NURSE PRACTITIONER

## 2023-02-07 PROCEDURE — 80053 COMPREHEN METABOLIC PANEL: CPT | Performed by: NURSE PRACTITIONER

## 2023-02-07 PROCEDURE — 1126F AMNT PAIN NOTED NONE PRSNT: CPT | Performed by: NURSE PRACTITIONER

## 2023-02-07 PROCEDURE — G0402 INITIAL PREVENTIVE EXAM: HCPCS | Performed by: NURSE PRACTITIONER

## 2023-02-07 PROCEDURE — 84100 ASSAY OF PHOSPHORUS: CPT | Performed by: NURSE PRACTITIONER

## 2023-02-07 PROCEDURE — 82306 VITAMIN D 25 HYDROXY: CPT | Performed by: NURSE PRACTITIONER

## 2023-02-07 PROCEDURE — 83735 ASSAY OF MAGNESIUM: CPT | Performed by: NURSE PRACTITIONER

## 2023-02-07 PROCEDURE — 1159F MED LIST DOCD IN RCRD: CPT | Performed by: NURSE PRACTITIONER

## 2023-02-07 PROCEDURE — 1170F FXNL STATUS ASSESSED: CPT | Performed by: NURSE PRACTITIONER

## 2023-02-07 NOTE — PROGRESS NOTES
The ABCs of the Annual Wellness Visit  Subsequent Medicare Wellness Visit    Subjective      Ida Vincent is a 62 y.o. female who presents for a Subsequent Medicare Wellness Visit.    The following portions of the patient's history were reviewed and   updated as appropriate: allergies, current medications, past family history, past medical history, past social history, past surgical history and problem list.    Compared to one year ago, the patient feels her physical   health is worse.    Compared to one year ago, the patient feels her mental   health is better.    Recent Hospitalizations:  She was not admitted to the hospital during the last year.       Current Medical Providers:  Patient Care Team:  Mika Kearney APRN as PCP - General (Nurse Practitioner)  Phu Nicholson MD as Cardiologist (Cardiology)    Outpatient Medications Prior to Visit   Medication Sig Dispense Refill   • albuterol sulfate  (90 Base) MCG/ACT inhaler Inhale 2 puffs Every 4 (Four) Hours As Needed.     • aspirin 81 MG chewable tablet Chew 1 tablet Daily. 90 tablet 3   • cetirizine (zyrTEC) 10 MG tablet Take 1 tablet by mouth Every Night. 90 tablet 1   • chlorhexidine (PERIDEX) 0.12 % solution      • levothyroxine (SYNTHROID, LEVOTHROID) 50 MCG tablet Take 1 tablet by mouth Every Morning. 90 tablet 1   • metoprolol succinate XL (TOPROL-XL) 25 MG 24 hr tablet Take 1 tablet by mouth 2 (Two) Times a Day for 90 doses. 180 tablet 1   • metoprolol succinate XL (TOPROL-XL) 50 MG 24 hr tablet Take 50 mg by mouth 2 (two) times a day.     • ondansetron (ZOFRAN) 8 MG tablet Take 1 tablet by mouth Every 8 (Eight) Hours As Needed for Nausea or Vomiting. 30 tablet 5   • pantoprazole (PROTONIX) 20 MG EC tablet Take 20 mg by mouth Daily.     • QUEtiapine (SEROquel) 100 MG tablet Take 100 mg by mouth Every Night.     • rosuvastatin (CRESTOR) 40 MG tablet Take 40 mg by mouth Every Night.     • traZODone (DESYREL) 100 MG tablet Take 200 mg  "by mouth Every Night.     • Trelegy Ellipta 100-62.5-25 MCG/INH inhaler Inhale 1 puff Every Night.     • alendronate (FOSAMAX) 35 MG tablet Take 35 mg by mouth Every 7 (Seven) Days. WEDNESDAYS.  HOLDING FOR SURGERY       No facility-administered medications prior to visit.       No opioid medication identified on active medication list. I have reviewed chart for other potential  high risk medication/s and harmful drug interactions in the elderly.          Aspirin is on active medication list. Aspirin use is indicated based on review of current medical condition/s. Pros and cons of this therapy have been discussed today. Benefits of this medication outweigh potential harm.  Patient has been encouraged to continue taking this medication.  .      Patient Active Problem List   Diagnosis   • Chronic obstructive pulmonary disease (HCC)   • Hyperlipemia   • Acquired hypothyroidism   • Asthma   • Age related osteoporosis   • Cigarette nicotine dependence without complication   • Gastroesophageal reflux disease without esophagitis   • Age-related osteoporosis without current pathological fracture     Advance Care Planning  Advance Directive is not on file.  ACP discussion was held with the patient during this visit. Patient does not have an advance directive, information provided.     Objective    Vitals:    02/07/23 1459   BP: (!) 84/62   BP Location: Left arm   Patient Position: Sitting   Cuff Size: Pediatric   Pulse: 57   Temp: 97.6 °F (36.4 °C)   TempSrc: Oral   SpO2: 97%   Weight: 44.1 kg (97 lb 3.2 oz)   Height: 165.1 cm (65\")   PainSc: 0-No pain     Estimated body mass index is 16.17 kg/m² as calculated from the following:    Height as of this encounter: 165.1 cm (65\").    Weight as of this encounter: 44.1 kg (97 lb 3.2 oz).    BMI is below normal parameters (malnutrition). Recommendations: high protein diet      Does the patient have evidence of cognitive impairment?   No    Lab Results   Component Value Date    TRIG " 119 2023    HDL 43 2023    LDL 90 2023    VLDL 21 2023          HEALTH RISK ASSESSMENT    Smoking Status:  Social History     Tobacco Use   Smoking Status Every Day   • Packs/day: 1.00   • Years: 45.00   • Pack years: 45.00   • Types: Cigarettes   • Last attempt to quit: 2021   • Years since quittin.8   Smokeless Tobacco Never     Alcohol Consumption:  Social History     Substance and Sexual Activity   Alcohol Use Not Currently     Fall Risk Screen:    STEADI Fall Risk Assessment was completed, and patient is at LOW risk for falls.Assessment completed on:2023    Depression Screening:  PHQ-2/PHQ-9 Depression Screening 2023   Little Interest or Pleasure in Doing Things 1-->several days   Feeling Down, Depressed or Hopeless 0-->not at all   PHQ-9: Brief Depression Severity Measure Score 1       Health Habits and Functional and Cognitive Screening:  Functional & Cognitive Status 2023   Do you have difficulty preparing food and eating? No   Do you have difficulty bathing yourself, getting dressed or grooming yourself? No   Do you have difficulty using the toilet? No   Do you have difficulty moving around from place to place? No   Do you have trouble with steps or getting out of a bed or a chair? No   Current Diet Limited Junk Food   Dental Exam Up to date   Eye Exam Up to date   Exercise (times per week) 0 times per week   Current Exercises Include No Regular Exercise   Do you need help using the phone?  No   Are you deaf or do you have serious difficulty hearing?  No   Do you need help with transportation? No   Do you need help shopping? No   Do you need help preparing meals?  No   Do you need help with housework?  No   Do you need help with laundry? No   Do you need help taking your medications? No   Do you need help managing money? No   Do you ever drive or ride in a car without wearing a seat belt? No   Have you felt unusual stress, anger or loneliness in the last month? No    Who do you live with? Spouse   If you need help, do you have trouble finding someone available to you? No   Have you been bothered in the last four weeks by sexual problems? No   Do you have difficulty concentrating, remembering or making decisions? No       Age-appropriate Screening Schedule:  Refer to the list below for future screening recommendations based on patient's age, sex and/or medical conditions. Orders for these recommended tests are listed in the plan section. The patient has been provided with a written plan.    Health Maintenance   Topic Date Due   • TDAP/TD VACCINES (1 - Tdap) Never done   • ZOSTER VACCINE (1 of 2) Never done   • INFLUENZA VACCINE  03/31/2023 (Originally 8/1/2022)   • LIPID PANEL  01/06/2024   • MAMMOGRAM  08/05/2024   • DXA SCAN  01/30/2025                CMS Preventative Services Quick Reference  Risk Factors Identified During Encounter:    Immunizations Discussed/Encouraged: Hepatitis A Vaccine/Series and Shingrix  Polypharmacy: Medication List reviewed and Medications are appropriate for patient  Tobacco Use/Dependance Risk (use dotphrase .tobaccocessation for documentation)    Ida LEWIS Carlton  reports that she has been smoking cigarettes. She has a 45.00 pack-year smoking history. She has never used smokeless tobacco.. I have educated her on the risk of diseases from using tobacco products such as cancer, COPD and heart disease.     I advised her to quit and she is not willing to quit.    I spent 3  minutes counseling the patient.           The above risks/problems have been discussed with the patient.  Pertinent information has been shared with the patient in the After Visit Summary.    Diagnoses and all orders for this visit:    1. Medicare annual wellness visit, subsequent (Primary)    2. Coronary artery calcification  -     Ambulatory Referral to Cardiology    3. Prolonged P-R interval  -     Ambulatory Referral to Cardiology    4. Palpitations  -     Ambulatory Referral  to Cardiology    5. Age-related osteoporosis without current pathological fracture  -     Vitamin D,25-Hydroxy  -     Phosphorus  -     Magnesium  -     Comprehensive metabolic panel  -     Ambulatory Referral to [facility] For Infusion Treatment    6. Skin lesion  -     Ambulatory Referral to Dermatology    7. Chronic obstructive pulmonary disease, unspecified COPD type (HCC)    Other orders  -     denosumab (PROLIA) syringe 60 mg      Reviewed her CT scan and DEXA scan results.  CT scan had noted moderate emphysema and extensive coronary artery calcifications.  She has a history of palpitations with SVT.  Will consult cardiology for further evaluation and treatment.  She does note a skin lesion on her left side.  She had a previously shaved her biopsy.  It has regrown and become larger.  Will consult dermatology for further evaluation and treatment.  DEXA scan showed increase osteoporosis.  Discussed switching to Prolia.  We will discontinue Fosamax and start Prolia injections every 6 months.  We will check the following labs.    Discussed Prolia, potential side effects, as well as risks and benefits of treatment with patient. Potential side effects include musculoskeletal pain, hypocalcemia, infections, osteonecrosis of the jaw, etc. Discussed increased risk of spine fractures with stopping, skipping, or delaying scheduled Prolia injections. Patient verbalizes understanding     Patient was counseled on appropriate calcium and vitamin D intake and supplementation.   Advised patient to get 1,000 mg to 1,200 mg of elemental calcium per day along with Vitamin D, from diet as well as supplements. Calcium information given to patient. Advised patient not to exceed 1200 mg of elemental calcium per day.  Encouraged walking as weightbearing exercise, patient to avoid high impact activities, sudden bending, lifting heavy objects, etc.    Discussed fall prevention techniques including use of nonskid mats, removal of loose wires,  lighting hallways, stair rails and entrances, installing handrails in bathrooms stairways and hallways,, wearing sturdy low heel support shoes, etc.    Follow Up:   Next Medicare Wellness visit to be scheduled in 1 year.      An After Visit Summary and PPPS were made available to the patient.

## 2023-02-08 ENCOUNTER — TELEPHONE (OUTPATIENT)
Dept: FAMILY MEDICINE CLINIC | Facility: CLINIC | Age: 63
End: 2023-02-08
Payer: MEDICARE

## 2023-02-08 LAB
25(OH)D3 SERPL-MCNC: 69.5 NG/ML (ref 30–100)
ALBUMIN SERPL-MCNC: 5 G/DL (ref 3.5–5.2)
ALBUMIN/GLOB SERPL: 2.2 G/DL
ALP SERPL-CCNC: 68 U/L (ref 39–117)
ALT SERPL W P-5'-P-CCNC: 8 U/L (ref 1–33)
ANION GAP SERPL CALCULATED.3IONS-SCNC: 11.8 MMOL/L (ref 5–15)
AST SERPL-CCNC: 15 U/L (ref 1–32)
BILIRUB SERPL-MCNC: 0.2 MG/DL (ref 0–1.2)
BUN SERPL-MCNC: 5 MG/DL (ref 8–23)
BUN/CREAT SERPL: 9.4 (ref 7–25)
CALCIUM SPEC-SCNC: 9.2 MG/DL (ref 8.6–10.5)
CHLORIDE SERPL-SCNC: 102 MMOL/L (ref 98–107)
CO2 SERPL-SCNC: 25.2 MMOL/L (ref 22–29)
CREAT SERPL-MCNC: 0.53 MG/DL (ref 0.57–1)
EGFRCR SERPLBLD CKD-EPI 2021: 106 ML/MIN/1.73
GLOBULIN UR ELPH-MCNC: 2.3 GM/DL
GLUCOSE SERPL-MCNC: 87 MG/DL (ref 65–99)
MAGNESIUM SERPL-MCNC: 2.4 MG/DL (ref 1.6–2.4)
PHOSPHATE SERPL-MCNC: 3.7 MG/DL (ref 2.5–4.5)
POTASSIUM SERPL-SCNC: 4.3 MMOL/L (ref 3.5–5.2)
PROT SERPL-MCNC: 7.3 G/DL (ref 6–8.5)
SODIUM SERPL-SCNC: 139 MMOL/L (ref 136–145)

## 2023-02-08 NOTE — TELEPHONE ENCOUNTER
Caller: Ida Vincent    Relationship: Self    Best call back number: [phone]    What is the best time to reach you: ANYTIME    Who are you requesting to speak with (clinical staff, provider,  specific staff member): CLINICAL    What was the call regarding: PATIENT RECEIVED CALL FROM ERICK'S NURSE ABOUT SCHEDULING DERMATOLOGY APPOINTMENT AND SHE WAS ADVISED TO CHANGE PROVIDER WITH MARISA. SHE HAS DONE SO, AND THEY INFORMED PATIENT THAT IT WOULD BE ABOUT 11:40 TODAY, 02/08/2023, WHEN IT IS FULLY IN SYSTEM.     Do you require a callback: YES TO SCHEDULE DERMATOLOGY APPOINTMENT

## 2023-02-21 ENCOUNTER — HOSPITAL ENCOUNTER (OUTPATIENT)
Dept: INFUSION THERAPY | Facility: HOSPITAL | Age: 63
Discharge: HOME | End: 2023-02-21
Admitting: NURSE PRACTITIONER
Payer: MEDICARE

## 2023-02-21 VITALS
HEART RATE: 59 BPM | DIASTOLIC BLOOD PRESSURE: 67 MMHG | BODY MASS INDEX: 16.27 KG/M2 | RESPIRATION RATE: 18 BRPM | SYSTOLIC BLOOD PRESSURE: 108 MMHG | WEIGHT: 97.66 LBS | OXYGEN SATURATION: 99 % | HEIGHT: 65 IN | TEMPERATURE: 97.7 F

## 2023-02-21 DIAGNOSIS — M81.0 AGE-RELATED OSTEOPOROSIS WITHOUT CURRENT PATHOLOGICAL FRACTURE: Primary | ICD-10-CM

## 2023-02-21 PROCEDURE — 96372 THER/PROPH/DIAG INJ SC/IM: CPT

## 2023-02-21 PROCEDURE — 25010000002 DENOSUMAB 60 MG/ML SOLUTION PREFILLED SYRINGE: Performed by: NURSE PRACTITIONER

## 2023-02-21 RX ADMIN — DENOSUMAB 60 MG: 60 INJECTION SUBCUTANEOUS at 13:39

## 2023-02-28 ENCOUNTER — OFFICE VISIT (OUTPATIENT)
Dept: CARDIOLOGY | Facility: CLINIC | Age: 63
End: 2023-02-28
Payer: MEDICARE

## 2023-02-28 VITALS
HEIGHT: 65 IN | SYSTOLIC BLOOD PRESSURE: 96 MMHG | DIASTOLIC BLOOD PRESSURE: 59 MMHG | BODY MASS INDEX: 16.69 KG/M2 | WEIGHT: 100.2 LBS | HEART RATE: 61 BPM

## 2023-02-28 DIAGNOSIS — R42 POSTURAL DIZZINESS WITH PRESYNCOPE: ICD-10-CM

## 2023-02-28 DIAGNOSIS — I25.84 CORONARY ARTERY CALCIFICATION: ICD-10-CM

## 2023-02-28 DIAGNOSIS — Z72.0 TOBACCO ABUSE: ICD-10-CM

## 2023-02-28 DIAGNOSIS — E78.2 MIXED HYPERLIPIDEMIA: ICD-10-CM

## 2023-02-28 DIAGNOSIS — R00.2 PALPITATIONS: Primary | ICD-10-CM

## 2023-02-28 DIAGNOSIS — R55 POSTURAL DIZZINESS WITH PRESYNCOPE: ICD-10-CM

## 2023-02-28 DIAGNOSIS — I25.10 CORONARY ARTERY CALCIFICATION: ICD-10-CM

## 2023-02-28 PROCEDURE — 99204 OFFICE O/P NEW MOD 45 MIN: CPT | Performed by: INTERNAL MEDICINE

## 2023-02-28 RX ORDER — DIPHENOXYLATE HYDROCHLORIDE AND ATROPINE SULFATE 2.5; .025 MG/1; MG/1
1 TABLET ORAL 2 TIMES DAILY
COMMUNITY

## 2023-02-28 RX ORDER — VARENICLINE TARTRATE 1 MG/1
1 TABLET, FILM COATED ORAL 2 TIMES DAILY
Qty: 60 TABLET | Refills: 5 | Status: SHIPPED | OUTPATIENT
Start: 2023-02-28

## 2023-02-28 RX ORDER — METOPROLOL SUCCINATE 50 MG/1
50 TABLET, EXTENDED RELEASE ORAL 2 TIMES DAILY
Qty: 180 TABLET | Refills: 3 | Status: SHIPPED | OUTPATIENT
Start: 2023-02-28

## 2023-02-28 NOTE — PROGRESS NOTES
Chief Complaint  Establish Care    Subjective        Ida Vincent presents to [facility] CARDIOLOGY  History of present illness:    Patient is a 62-year-old female with history of palpitations treated with Toprol.  She states she was treated for PVCs and had short runs of nonsustained ventricular tachycardia while she was asleep.  She has worn several monitors in the past and saw a cardiologist at [facility] and Dr. Ruiz.  She states with the Toprol her palpitations are doing fine and she notes some very rarely and they do not bother her.  When she is walking around she notes no chest pain usually.  She can get a retrosternal burning sensation just sitting still and this also can occur sometimes when she is walking around.  She had a low-dose CAT scan of the chest due to her history of smoking which showed coronary artery calcifications.  She had stress test and echocardiogram back in 2019.  She had a heart cath done back in 2010.  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  She states she has always had low blood pressure.  She does note about 2-3 times a week she will get a little lightheaded but has not passed out.  She does not drink much water and she does drink a lot of caffeinated beverages.  She does not use much salt.  She smokes less than 1 pack/day.  She notes no alcohol use.  Mother and father no have no history of coronary artery disease.      Past Medical History:   Diagnosis Date   • Allergic    • Anxiety 1999   • Arthritis    • Asthma    • Cholelithiasis 1999   • COPD (chronic obstructive pulmonary disease) (HCC)    • DDD (degenerative disc disease), lumbar    • Depression 1999   • Diverticulosis 2021   • Fibromyalgia    • Fibromyalgia, primary    • GERD (gastroesophageal reflux disease)    • Heart block    • History of diverticulosis    • History of  palpitations    • Hyperlipemia    • Hypertension    • Hypothyroidism    • Low back pain    • Lumbago    • Osteopenia    • Pneumonia    • Premature ventricular contractions (PVCs) (VPCs)     HISTORY OF   • Seasonal allergies    • Slow to wake up after anesthesia    • Substance abuse (HCC)     Last drink    • Thyroid disorder          Past Surgical History:   Procedure Laterality Date   • ABDOMINOPLASTY     • ATRIAL SEPTAL DEFECT REPAIR     • BREAST AUGMENTATION     • BREAST IMPLANT SURGERY Bilateral 10/18/2021    Procedure: BILATERAL REMOVAL OF IMPLANTS;  Surgeon: BRUCE Suresh MD;  Location: [facility];  Service: Plastics;  Laterality: Bilateral;   • BREAST SURGERY  10/18/21   • CARDIAC CATHETERIZATION     • CHOLECYSTECTOMY     • COLONOSCOPY     • COSMETIC SURGERY      Tummy tu   • DENTAL PROCEDURE     • ENDOSCOPY     • GALLBLADDER SURGERY     • HYSTERECTOMY     • MASTOPEXY Bilateral 10/18/2021    Procedure: BILATERAL MASTOPEXY;  Surgeon: BRUCE Suresh MD;  Location: [facility];  Service: Plastics;  Laterality: Bilateral;   • OSTEOTOMY     • OTHER SURGICAL HISTORY      metal implants    • OTHER SURGICAL HISTORY      surgical clips RIGHT FACE ARTERY WITH JAW SURGERY   • SEPTOPLASTY     • SHOULDER ARTHROSCOPIC ACROMIOCLAVICULAR (AC) JOINT RECONSTRUCTION Right    • SPINE SURGERY     • TONSILLECTOMY     • TOTAL ABDOMINAL HYSTERECTOMY WITH SALPINGO OOPHORECTOMY      TVH   • VERTEBROPLASTY  2021    Lumbar 2          Social History     Socioeconomic History   • Marital status:    Tobacco Use   • Smoking status: Every Day     Packs/day: 1.00     Years: 45.00     Pack years: 45.00     Types: Cigarettes     Last attempt to quit: 2021     Years since quittin.9   • Smokeless tobacco: Never   Vaping Use   • Vaping Use: Never used   Substance and Sexual Activity   • Alcohol use: Not Currently   • Drug use: Yes     Frequency: 7.0 times per week     Types:  "Marijuana     Comment: Patient states she uses marijuana every night-01/06/2022   • Sexual activity: Not Currently     Partners: Male     Birth control/protection: Vasectomy, Hysterectomy         Family History   Problem Relation Age of Onset   • Diabetes Mother    • Hypertension Mother    • Anxiety disorder Mother    • Arthritis Mother    • Alcohol abuse Mother    • Thyroid disease Mother    • Vision loss Mother    • Mental illness Sister    • Arthritis Sister    • Early death Brother    • Diabetes Brother    • Arthritis Brother    • Cancer Father    • Arthritis Father    • Heart disease Daughter         Cardiac ablation   • Liver disease Brother    • Malig Hyperthermia Neg Hx           Allergies   Allergen Reactions   • Ciprofloxacin Anaphylaxis and Other (See Comments)   • Thimerosal Swelling   • Benadryl [Diphenhydramine] Other (See Comments)     \"It makes me climb the walls and shake\"   • Prednisone Other (See Comments)     CHF  Pt stated solu-medrol is okay to take   • Erythromycin Nausea And Vomiting            Current Outpatient Medications:   •  albuterol sulfate  (90 Base) MCG/ACT inhaler, Inhale 2 puffs Every 4 (Four) Hours As Needed., Disp: , Rfl:   •  aspirin 81 MG chewable tablet, Chew 1 tablet Daily., Disp: 90 tablet, Rfl: 3  •  cetirizine (zyrTEC) 10 MG tablet, Take 1 tablet by mouth Every Night., Disp: 90 tablet, Rfl: 1  •  chlorhexidine (PERIDEX) 0.12 % solution, , Disp: , Rfl:   •  Denosumab (PROLIA SC), Inject  under the skin into the appropriate area as directed Every 6 (Six) Months., Disp: , Rfl:   •  levothyroxine (SYNTHROID, LEVOTHROID) 50 MCG tablet, Take 1 tablet by mouth Every Morning., Disp: 90 tablet, Rfl: 1  •  metoprolol succinate XL (TOPROL-XL) 25 MG 24 hr tablet, Take 1 tablet by mouth 2 (Two) Times a Day for 90 doses., Disp: 180 tablet, Rfl: 1  •  metoprolol succinate XL (TOPROL-XL) 50 MG 24 hr tablet, Take 1 tablet by mouth 2 (Two) Times a Day., Disp: 180 tablet, Rfl: 3  •  " "multivitamin tablet tablet, Take 1 tablet by mouth 2 (Two) Times a Day., Disp: , Rfl:   •  ondansetron (ZOFRAN) 8 MG tablet, Take 1 tablet by mouth Every 8 (Eight) Hours As Needed for Nausea or Vomiting., Disp: 30 tablet, Rfl: 5  •  pantoprazole (PROTONIX) 20 MG EC tablet, Take 1 tablet by mouth Daily., Disp: , Rfl:   •  QUEtiapine (SEROquel) 100 MG tablet, Take 1 tablet by mouth Every Night., Disp: , Rfl:   •  rosuvastatin (CRESTOR) 40 MG tablet, Take 1 tablet by mouth Every Night., Disp: , Rfl:   •  traZODone (DESYREL) 100 MG tablet, Take 2 tablets by mouth Every Night., Disp: , Rfl:   •  Trelegy Ellipta 100-62.5-25 MCG/INH inhaler, Inhale 1 puff Every Night., Disp: , Rfl:   •  varenicline (Chantix Continuing Month Blu) 1 MG tablet, Take 1 tablet by mouth 2 (Two) Times a Day., Disp: 60 tablet, Rfl: 5      ROS:  Cardiac review of systems positive for atypical chest pain.    Objective     BP 96/59   Pulse 61   Ht 165.1 cm (65\")   Wt 45.5 kg (100 lb 3.2 oz)   BMI 16.67 kg/m²       General Appearance:   · well developed  · well nourished  HENT:   · oropharynx moist  · lips not cyanotic  Respiratory:  · no respiratory distress  · normal breath sounds  · no rales  Cardiovascular:  · no jugular venous distention  · regular rhythm  · S1 normal, S2 normal  · no S3, no S4   · no murmur  · no rub, no thrill  · No carotid bruit  · pedal pulses normal  · lower extremity edema: none    Musculoskeletal:  · no clubbing of fingers.   · normocephalic, head atraumatic  Skin:   · warm, dry  Psychiatric:  · judgement and insight appropriate  · normal mood and affect    ECHO:    STRESS:    CATH:  No results found for this or any previous visit.    BMP:   Glucose   Date Value Ref Range Status   02/07/2023 87 65 - 99 mg/dL Final     BUN   Date Value Ref Range Status   02/07/2023 5 (L) 8 - 23 mg/dL Final     Creatinine   Date Value Ref Range Status   02/07/2023 0.53 (L) 0.57 - 1.00 mg/dL Final     Sodium   Date Value Ref Range Status "   02/07/2023 139 136 - 145 mmol/L Final     Potassium   Date Value Ref Range Status   02/07/2023 4.3 3.5 - 5.2 mmol/L Final     Chloride   Date Value Ref Range Status   02/07/2023 102 98 - 107 mmol/L Final     CO2   Date Value Ref Range Status   02/07/2023 25.2 22.0 - 29.0 mmol/L Final     Calcium   Date Value Ref Range Status   02/07/2023 9.2 8.6 - 10.5 mg/dL Final     BUN/Creatinine Ratio   Date Value Ref Range Status   02/07/2023 9.4 7.0 - 25.0 Final     Anion Gap   Date Value Ref Range Status   02/07/2023 11.8 5.0 - 15.0 mmol/L Final     eGFR   Date Value Ref Range Status   02/07/2023 106.0 >60.0 mL/min/1.73 Final     LIPIDS:  Total Cholesterol   Date Value Ref Range Status   01/06/2023 154 0 - 200 mg/dL Final     Triglycerides   Date Value Ref Range Status   01/06/2023 119 0 - 150 mg/dL Final     HDL Cholesterol   Date Value Ref Range Status   01/06/2023 43 40 - 60 mg/dL Final     LDL Cholesterol    Date Value Ref Range Status   01/06/2023 90 0 - 100 mg/dL Final     VLDL Cholesterol   Date Value Ref Range Status   01/06/2023 21 5 - 40 mg/dL Final     LDL/HDL Ratio   Date Value Ref Range Status   01/06/2023 2.03  Final         Procedures             ASSESSMENT:  Encounter Diagnoses   Name Primary?   • Palpitations Yes   • Coronary artery calcification    • Postural dizziness with presyncope    • Mixed hyperlipidemia    • Tobacco abuse          PLAN:    1.  Patient's chest pain is fairly atypical.  She did have coronary artery calcification seen on the low-dose CAT scan of the lungs.  I did offer her a stress test but also said we could just start a regular exercise program and see if she develops any exertional chest pain with walking.  She would like to just start the regular exercise program and watch closely for exertional chest pain relieved with rest.  2.  Blood pressure is on low end.  Asked the patient to drastically increase the amount of water she is using and use liberal salt.  3.  Encourage smoking  cessation.  The patient is in agreement to retry Chantix.  4.  We often use the Toprol twice a day and cardiology I have no problems with the way she is taking it and it has greatly improved her palpitations (apparently consistent with PVCs and short runs of nonsustained VT during sleeping hours).  Her palpitations are not bothering her at this time.  5.  Patient had cholesterol checked 1/6/2023 with LDL 90, HDL 43, and triglycerides 119.  These are under good control.  We will continue the Crestor.  6.  Patient had echocardiogram done 6/21/2019 that showed normal biventricular function and no significant valvular disease.  She did have this heart cath back in 2010 with apparently no blockages but they said that possibly she had congenital narrowing of her left main coronary artery but it was not significant.          Patient was given instructions and counseling regarding her condition or for health maintenance advice. Please see specific information pulled into the AVS if appropriate.         Grayson Vang MD   2/28/2023  11:50 EST

## 2023-04-18 ENCOUNTER — TELEPHONE (OUTPATIENT)
Dept: FAMILY MEDICINE CLINIC | Facility: CLINIC | Age: 63
End: 2023-04-18
Payer: MEDICARE

## 2023-04-18 RX ORDER — PANTOPRAZOLE SODIUM 20 MG/1
20 TABLET, DELAYED RELEASE ORAL DAILY
Qty: 90 TABLET | Refills: 1 | Status: SHIPPED | OUTPATIENT
Start: 2023-04-18

## 2023-04-18 RX ORDER — ROSUVASTATIN CALCIUM 40 MG/1
40 TABLET, COATED ORAL NIGHTLY
Qty: 90 TABLET | Refills: 1 | Status: SHIPPED | OUTPATIENT
Start: 2023-04-18

## 2023-04-18 NOTE — TELEPHONE ENCOUNTER
Patient came in  and is needing refills for these prescription  Rosuvastatin 40MG  Pantoprazole 20 MG  Trelegy 100 MG    [place]

## 2023-04-18 NOTE — TELEPHONE ENCOUNTER
Spoke to patient and let her know that medication has been sent to the pharmacy as requested. She stated that she understood.

## 2023-04-24 ENCOUNTER — TELEPHONE (OUTPATIENT)
Dept: FAMILY MEDICINE CLINIC | Facility: CLINIC | Age: 63
End: 2023-04-24
Payer: MEDICARE

## 2023-04-24 NOTE — TELEPHONE ENCOUNTER
Caller: CarltonIda    Relationship: Self    Best call back number: [phone]    Requested Prescriptions:   Requested Prescriptions     Pending Prescriptions Disp Refills   • metoprolol succinate XL (TOPROL-XL) 25 MG 24 hr tablet 180 tablet 1     Sig: Take 1 tablet by mouth 2 (Two) Times a Day for 90 doses.        Pharm[facility] where [facility] be sent[facility]  [place] [place] [address] [phone] [facility] [phone]      Last office visit with prescribing clinician: 2/7/2023   Last telemedicine visit with prescribing clinician: 5/9/2023   Next office visit with prescribing clinician: 5/9/2023     Additional details provided by patient: PATIENT HAS MORE THAN A THREE DAY SUPPLY OF MEDICATION. SHE WILL RUN OUT BEFORE HER APPT. PLEASE SEND NEW PRESCRIPTION INTO PHARMACY ASAP.    Does the patient have less than a 3 day supply:  [] Yes  [x] No    Would you like a call back once the refill request has been completed: [] Yes [] No    If the office needs to give you a call back, can they leave a voicemail: [] Yes [] No    Earlene Gaspar Rep   04/24/23 10:53 EDT

## 2023-04-25 RX ORDER — METOPROLOL SUCCINATE 25 MG/1
25 TABLET, EXTENDED RELEASE ORAL 2 TIMES DAILY
Qty: 180 TABLET | Refills: 1 | Status: SHIPPED | OUTPATIENT
Start: 2023-04-25 | End: 2023-06-09

## 2023-04-25 NOTE — TELEPHONE ENCOUNTER
Spoke with patient and let her know medication was sent to [facility] pharmacy for her.

## 2023-05-09 ENCOUNTER — OFFICE VISIT (OUTPATIENT)
Dept: FAMILY MEDICINE CLINIC | Facility: CLINIC | Age: 63
End: 2023-05-09
Payer: MEDICARE

## 2023-05-09 VITALS
HEIGHT: 65 IN | DIASTOLIC BLOOD PRESSURE: 70 MMHG | WEIGHT: 93.5 LBS | BODY MASS INDEX: 15.58 KG/M2 | OXYGEN SATURATION: 99 % | SYSTOLIC BLOOD PRESSURE: 106 MMHG | TEMPERATURE: 97.9 F | HEART RATE: 70 BPM

## 2023-05-09 DIAGNOSIS — G62.9 NEUROPATHY: ICD-10-CM

## 2023-05-09 DIAGNOSIS — E03.9 ACQUIRED HYPOTHYROIDISM: ICD-10-CM

## 2023-05-09 DIAGNOSIS — F17.210 CIGARETTE NICOTINE DEPENDENCE WITHOUT COMPLICATION: ICD-10-CM

## 2023-05-09 DIAGNOSIS — M79.7 FIBROMYALGIA: Primary | ICD-10-CM

## 2023-05-09 DIAGNOSIS — M43.06 LUMBAR SPONDYLOLYSIS: ICD-10-CM

## 2023-05-09 DIAGNOSIS — Z51.81 MEDICATION MONITORING ENCOUNTER: ICD-10-CM

## 2023-05-09 LAB
AMPHET+METHAMPHET UR QL: NEGATIVE
AMPHETAMINE INTERNAL CONTROL: ABNORMAL
AMPHETAMINES UR QL: NEGATIVE
BARBITURATE INTERNAL CONTROL: ABNORMAL
BARBITURATES UR QL SCN: NEGATIVE
BENZODIAZ UR QL SCN: NEGATIVE
BENZODIAZEPINE INTERNAL CONTROL: ABNORMAL
BUPRENORPHINE INTERNAL CONTROL: ABNORMAL
BUPRENORPHINE SERPL-MCNC: NEGATIVE NG/ML
CANNABINOIDS SERPL QL: POSITIVE
COCAINE INTERNAL CONTROL: ABNORMAL
COCAINE UR QL: NEGATIVE
EXPIRATION DATE: ABNORMAL
Lab: ABNORMAL
MDMA (ECSTASY) INTERNAL CONTROL: ABNORMAL
MDMA UR QL SCN: NEGATIVE
METHADONE INTERNAL CONTROL: ABNORMAL
METHADONE UR QL SCN: NEGATIVE
METHAMPHETAMINE INTERNAL CONTROL: ABNORMAL
OPIATES INTERNAL CONTROL: ABNORMAL
OPIATES UR QL: NEGATIVE
OXYCODONE INTERNAL CONTROL: ABNORMAL
OXYCODONE UR QL SCN: NEGATIVE
PCP UR QL SCN: NEGATIVE
PHENCYCLIDINE INTERNAL CONTROL: ABNORMAL
T4 FREE SERPL-MCNC: 1.59 NG/DL (ref 0.93–1.7)
THC INTERNAL CONTROL: ABNORMAL
TSH SERPL DL<=0.05 MIU/L-ACNC: 2.14 UIU/ML (ref 0.27–4.2)

## 2023-05-09 PROCEDURE — 84443 ASSAY THYROID STIM HORMONE: CPT | Performed by: NURSE PRACTITIONER

## 2023-05-09 PROCEDURE — 84439 ASSAY OF FREE THYROXINE: CPT | Performed by: NURSE PRACTITIONER

## 2023-05-09 RX ORDER — GABAPENTIN 100 MG/1
100 CAPSULE ORAL 3 TIMES DAILY
Qty: 90 CAPSULE | Refills: 2 | Status: SHIPPED | OUTPATIENT
Start: 2023-05-09

## 2023-05-09 NOTE — PROGRESS NOTES
Answers for HPI/ROS submitted by the patient on 2023  Please describe your symptoms.: F/U  Have you had these symptoms before?: Yes  How long have you been having these symptoms?: Greater than 2 weeks  Please list any medications you are currently taking for this condition.: Motrin  Please describe any probable cause for these symptoms. : Post injury and surgery  What is the primary reason for your visit?: Other    Chief Complaint  COPD, Hypothyroidism, Hyperlipidemia, Joint Pain (Knee and elbow pain), and Back Pain    Subjective         Ida Vincent presents to [facility]  HPI   Presents today for follow-up on COPD, hypothyroidism, knee pain and back pain and osteoporosis.  She was just started on Prolia injections.  Tolerated injection well.  She does note a history of fibromyalgia.  She has knee and elbow joint pain and back pain.  She has lumbar spondylosis.  Denies chest pain, syncope, palpitations.  Denies coughing wheezing.  She has decreased her smoking to half pack per day.  Last labs her TSH was within normal limits and free T4 was slightly elevated.    Social History     Socioeconomic History   • Marital status:    Tobacco Use   • Smoking status: Every Day     Packs/day: 0.50     Years: 45.00     Pack years: 22.50     Types: Cigarettes     Last attempt to quit: 2021     Years since quittin.1   • Smokeless tobacco: Never   Vaping Use   • Vaping Use: Never used   Substance and Sexual Activity   • Alcohol use: Not Currently   • Drug use: Yes     Frequency: 7.0 times per week     Types: Marijuana     Comment: Patient states she uses marijuana every night-2022   • Sexual activity: Not Currently     Partners: Male     Birth control/protection: Vasectomy, Hysterectomy        Objective     Vitals:    23 1440   BP: 106/70   BP Location: Left arm   Patient Position: Sitting   Cuff Size: Small Adult   Pulse: 70   Temp: 97.9 °F (36.6 °C)   TempSrc:  "Oral   SpO2: 99%   Weight: 42.4 kg (93 lb 8 oz)   Height: 165.1 cm (65\")        Body mass index is 15.56 kg/m².    Wt Readings from Last 3 Encounters:   05/09/23 42.4 kg (93 lb 8 oz)   02/28/23 45.5 kg (100 lb 3.2 oz)   02/21/23 44.3 kg (97 lb 10.6 oz)       BP Readings from Last 3 Encounters:   05/09/23 106/70   02/28/23 96/59   02/21/23 108/67         Physical Exam  Vitals reviewed.   Constitutional:       Appearance: Normal appearance. She is well-developed.   HENT:      Head: Normocephalic and atraumatic.      Right Ear: External ear normal.      Left Ear: External ear normal.      Mouth/Throat:      Pharynx: No oropharyngeal exudate.   Eyes:      Conjunctiva/sclera: Conjunctivae normal.      Pupils: Pupils are equal, round, and reactive to light.   Cardiovascular:      Rate and Rhythm: Normal rate and regular rhythm.      Heart sounds: No murmur heard.    No friction rub. No gallop.   Pulmonary:      Effort: Pulmonary effort is normal.      Breath sounds: Normal breath sounds. No wheezing or rhonchi.   Abdominal:      General: Bowel sounds are normal. There is no distension.      Palpations: Abdomen is soft.      Tenderness: There is no abdominal tenderness.   Skin:     General: Skin is warm and dry.   Neurological:      Mental Status: She is alert and oriented to person, place, and time.   Psychiatric:         Mood and Affect: Mood and affect normal.         Behavior: Behavior normal.         Thought Content: Thought content normal.         Judgment: Judgment normal.          Result Review :   The following data was reviewed by: BRYANT Duenas on 05/09/2023:  Common labs        1/6/2023    15:38 2/7/2023    16:23   Common Labs   Glucose 90   87     BUN 4   5     Creatinine 0.67   0.53     Sodium 141   139     Potassium 4.3   4.3     Chloride 104   102     Calcium 9.9   9.2     Albumin 4.8   5.0     Total Bilirubin 0.4   0.2     Alkaline Phosphatase 59   68     AST (SGOT) 16   15     ALT (SGPT) 12   8   "   WBC 8.47      Hemoglobin 14.0      Hematocrit 41.7      Platelets 302      Total Cholesterol 154      Triglycerides 119      HDL Cholesterol 43      LDL Cholesterol  90         Pain Management Panel         Latest Ref Rng & Units 5/9/2023   Pain Management Panel   Amphetamine, Urine Qual Negative Negative     Barbiturates Screen, Urine Negative Negative     Benzodiazepine Screen, Urine Negative Negative     Buprenorphine, Screen, Urine Negative Negative     Cocaine Screen, Urine Negative Negative     Methadone Screen , Urine Negative Negative     Methamphetamine, Ur Negative Negative                    Procedures    Assessment and Plan   Diagnoses and all orders for this visit:    1. Fibromyalgia (Primary)    2. Neuropathy    3. Medication monitoring encounter  -     POC Urine Drug Screen [facility]    4. Lumbar spondylolysis    5. Acquired hypothyroidism  -     TSH+Free T4    6. Cigarette nicotine dependence without complication      We will repeat her TSH free T4.  Repeat labs in 3 months at her next office visit.  Patient reports she takes THC nightly to help her sleep.  Patient has a history of fibromyalgia.  She has previously been prescribed gabapentin.  She also reports neuropathy in her lower extremities.  Sergey reviewed.  UDS was positive for THC.  We will prescribe her gabapentin 100 mg 3 times daily as needed for neuropathy and fibromyalgia.    Risk and benefits of gabapentin discussed including side effect of increase somnolence/fatigue, misuse, abuse, risk of dependence, or diversion.  Need for routine monitoring discussed with patient.          Follow Up   Return in about 3 months (around 8/9/2023), or if symptoms worsen or fail to improve, for Next scheduled follow up.  Patient was given instructions and counseling regarding her condition or for health maintenance advice. Please see specific information pulled into the AVS if appropriate.     Please note that portions of this note were  completed with a voice recognition program.

## 2023-08-11 ENCOUNTER — OFFICE VISIT (OUTPATIENT)
Dept: FAMILY MEDICINE CLINIC | Facility: CLINIC | Age: 63
End: 2023-08-11
Payer: MEDICARE

## 2023-08-11 VITALS
HEART RATE: 58 BPM | OXYGEN SATURATION: 100 % | DIASTOLIC BLOOD PRESSURE: 64 MMHG | WEIGHT: 95 LBS | TEMPERATURE: 97.2 F | SYSTOLIC BLOOD PRESSURE: 108 MMHG | BODY MASS INDEX: 15.83 KG/M2 | HEIGHT: 65 IN

## 2023-08-11 DIAGNOSIS — M79.7 FIBROMYALGIA: ICD-10-CM

## 2023-08-11 DIAGNOSIS — K21.00 GASTROESOPHAGEAL REFLUX DISEASE WITH ESOPHAGITIS WITHOUT HEMORRHAGE: ICD-10-CM

## 2023-08-11 DIAGNOSIS — G62.9 NEUROPATHY: ICD-10-CM

## 2023-08-11 DIAGNOSIS — M54.2 CERVICAL PAIN: ICD-10-CM

## 2023-08-11 DIAGNOSIS — R11.0 NAUSEA: ICD-10-CM

## 2023-08-11 DIAGNOSIS — Z76.89 ENCOUNTER TO ESTABLISH CARE: Primary | ICD-10-CM

## 2023-08-11 RX ORDER — CETIRIZINE HYDROCHLORIDE 10 MG/1
10 TABLET ORAL DAILY
Qty: 90 TABLET | Refills: 3 | Status: SHIPPED | OUTPATIENT
Start: 2023-08-11

## 2023-08-11 RX ORDER — GABAPENTIN 300 MG/1
300 CAPSULE ORAL 2 TIMES DAILY
Qty: 180 CAPSULE | Refills: 1 | Status: SHIPPED | OUTPATIENT
Start: 2023-08-11

## 2023-08-11 RX ORDER — LEVOTHYROXINE SODIUM 0.05 MG/1
50 TABLET ORAL
Qty: 90 TABLET | Refills: 1 | Status: SHIPPED | OUTPATIENT
Start: 2023-08-11

## 2023-08-11 RX ORDER — ASPIRIN 81 MG/1
81 TABLET ORAL DAILY
Qty: 90 TABLET | Refills: 3 | Status: SHIPPED | OUTPATIENT
Start: 2023-08-11

## 2023-08-11 RX ORDER — ONDANSETRON HYDROCHLORIDE 8 MG/1
8 TABLET, FILM COATED ORAL EVERY 8 HOURS PRN
Qty: 30 TABLET | Refills: 5 | Status: SHIPPED | OUTPATIENT
Start: 2023-08-11

## 2023-08-11 RX ORDER — ROSUVASTATIN CALCIUM 40 MG/1
40 TABLET, COATED ORAL NIGHTLY
Qty: 90 TABLET | Refills: 1 | Status: SHIPPED | OUTPATIENT
Start: 2023-08-11

## 2023-08-11 RX ORDER — PANTOPRAZOLE SODIUM 20 MG/1
20 TABLET, DELAYED RELEASE ORAL DAILY
Qty: 90 TABLET | Refills: 1 | Status: SHIPPED | OUTPATIENT
Start: 2023-08-11

## 2023-08-11 NOTE — PROGRESS NOTES
Chief Complaint  Establish Care (New patient, PCP transfer of care), Pain (Lower back from a previous procedure and neck pain xJuly 2023), and Prolia Injections (Pt reports is due next Month- Sept 2023, uncertain if she needs to schedule or if new order if required- possible DEXA)    Subjective        Medical History: has a past medical history of Allergic, Anxiety (1999), Arthritis, Asthma, Cholelithiasis (1999), COPD (chronic obstructive pulmonary disease), DDD (degenerative disc disease), lumbar, Depression (1999), Diverticulosis (2021), Fibromyalgia, Fibromyalgia, primary, GERD (gastroesophageal reflux disease), Heart block, History of diverticulosis, History of palpitations, Hyperlipemia, Hypertension, Hypothyroidism, Low back pain, Lumbago, Osteopenia (2021), Pneumonia (1976), Premature ventricular contractions (PVCs) (VPCs), Seasonal allergies, Slow to wake up after anesthesia, Substance abuse, and Thyroid disorder.     Surgical History: has a past surgical history that includes Vertebroplasty (06/04/2021); Tonsillectomy; Hysterectomy; shoulder arthroscopic acromioclavicular (ac) joint reconstruction (Right); Cardiac catheterization; Breast Augmentation; Osteotomy; ASD repair; Dental surgery; Colonoscopy (2011); Esophagogastroduodenoscopy (2011); Gallbladder surgery; Other surgical history; Other surgical history; Abdominoplasty; Mastopexy (Bilateral, 10/18/2021); Breast Implant Revision (Bilateral, 10/18/2021); Breast surgery (10/18/21); Cholecystectomy (1999); Cosmetic surgery; Septoplasty (2010); Spine surgery (2021); Total abdominal hysterectomy w/ bilateral salpingoophorectomy; and Sinus surgery.     Family History: family history includes Alcohol abuse in her mother; Anxiety disorder in her mother; Arthritis in her brother, father, mother, and sister; Cancer in her father; Diabetes in her brother and mother; Early death in her brother and father; Heart disease in her daughter; Hyperlipidemia in her  mother and sister; Hypertension in her mother; Liver disease in her brother; Mental illness in her sister; Other in her son; Thyroid disease in her mother; Vision loss in her mother.     Social History: reports that she has been smoking cigarettes. She has a 22.50 pack-year smoking history. She has never used smokeless tobacco. She reports that she does not currently use alcohol. She reports current drug use. Frequency: 7.00 times per week. Drug: Marijuana.    Ida Vincent presents to [facility]  Neck Pain   This is a new problem. The current episode started more than 1 month ago. The problem occurs intermittently. The problem has been waxing and waning. The pain is present in the left side. The quality of the pain is described as aching (pulling). The pain is moderate. The symptoms are aggravated by twisting. The pain is Same all the time. Stiffness is present All day. Pertinent negatives include no syncope or trouble swallowing. She has tried neck support for the symptoms. The treatment provided no relief. Patient is a previous pt of Francisco Javier K[facility]ney, she comes in today to establish care.  She has a current past medical history of fibromyalgia, hypothyroidism, hypertension, heart palpitations with PVCs, alcohol dependence, age-related osteoporosis, current nicotine every day 1/4 pack user and current user of THC nightly to help with sleep.    Patient is requesting refills of her aspirin, Zofran, levothyroxine , Protonix, gabapentin, and Zyrtec.  Most recent UDS done May 2023 did show THC patient's urine, she was started on 100 mg of gabapentin 3 times a day, patient states she does not feel like it is helping and she never remembers to take the third dose in the afternoon.  Patient is inquiring whether we can increase the dose and omit them afternoon dose.    Objective   Vital Signs:   /64 (BP Location: Left arm, Patient Position: Sitting, Cuff Size: Adult)   Pulse 58   Temp  "97.2 øF (36.2 øC) (Temporal)   Ht 165.1 cm (65\")   Wt 43.1 kg (95 lb)   SpO2 100%   BMI 15.81 kg/mý       Wt Readings from Last 3 Encounters:   08/11/23 43.1 kg (95 lb)   05/09/23 42.4 kg (93 lb 8 oz)   02/28/23 45.5 kg (100 lb 3.2 oz)        BP Readings from Last 3 Encounters:   08/11/23 108/64   05/09/23 106/70   02/28/23 96/59      Physical Exam  Vitals reviewed.   Constitutional:       General: She is not in acute distress.     Appearance: Normal appearance. She is well-developed and underweight.   HENT:      Head: Normocephalic and atraumatic.      Right Ear: External ear normal.      Left Ear: External ear normal.   Eyes:      Conjunctiva/sclera: Conjunctivae normal.      Pupils: Pupils are equal, round, and reactive to light.   Neck:     Cardiovascular:      Rate and Rhythm: Normal rate and regular rhythm.      Heart sounds: No murmur heard.  Pulmonary:      Effort: Pulmonary effort is normal.      Breath sounds: Normal breath sounds. No wheezing or rhonchi.   Skin:     General: Skin is warm and dry.   Neurological:      Mental Status: She is alert and oriented to person, place, and time.   Psychiatric:         Mood and Affect: Mood and affect normal.         Behavior: Behavior normal.         Thought Content: Thought content normal.         Judgment: Judgment normal.      Result Review :  {The following data was reviewed by BRYANT Ruelas on 08/11/2023.  Common labs          1/6/2023    15:38 2/7/2023    16:23   Common Labs   Glucose 90  87    BUN 4  5    Creatinine 0.67  0.53    Sodium 141  139    Potassium 4.3  4.3    Chloride 104  102    Calcium 9.9  9.2    Albumin 4.8  5.0    Total Bilirubin 0.4  0.2    Alkaline Phosphatase 59  68    AST (SGOT) 16  15    ALT (SGPT) 12  8    WBC 8.47     Hemoglobin 14.0     Hematocrit 41.7     Platelets 302     Total Cholesterol 154     Triglycerides 119     HDL Cholesterol 43     LDL Cholesterol  90       Data reviewed : previous Francisco Javier HURTADO note     "         Current Outpatient Medications on File Prior to Visit   Medication Sig Dispense Refill    albuterol sulfate  (90 Base) MCG/ACT inhaler Inhale 2 puffs Every 4 (Four) Hours As Needed.      Denosumab (PROLIA SC) Inject  under the skin into the appropriate area as directed Every 6 (Six) Months.      Fluticasone-Umeclidin-Vilant (Trelegy Ellipta) 100-62.5-25 MCG/ACT inhaler Inhale 1 puff Every Night. 60 each 2    metoprolol succinate XL (TOPROL-XL) 50 MG 24 hr tablet Take 1 tablet by mouth 2 (Two) Times a Day. 180 tablet 3    QUEtiapine (SEROquel) 100 MG tablet Take 1 tablet by mouth Every Night.      traZODone (DESYREL) 100 MG tablet Take 2 tablets by mouth Every Night.      varenicline (Chantix Continuing Month Blu) 1 MG tablet Take 1 tablet by mouth 2 (Two) Times a Day. 60 tablet 5    [DISCONTINUED] aspirin 81 MG chewable tablet Chew 1 tablet Daily. 90 tablet 3    [DISCONTINUED] cetirizine (zyrTEC) 10 MG tablet Take 1 tablet by mouth Every Night. 90 tablet 1    [DISCONTINUED] gabapentin (NEURONTIN) 100 MG capsule Take 1 capsule by mouth 3 (Three) Times a Day. 90 capsule 2    [DISCONTINUED] levothyroxine (SYNTHROID, LEVOTHROID) 50 MCG tablet Take 1 tablet by mouth Every Morning. 90 tablet 1    [DISCONTINUED] ondansetron (ZOFRAN) 8 MG tablet Take 1 tablet by mouth Every 8 (Eight) Hours As Needed for Nausea or Vomiting. 30 tablet 5    [DISCONTINUED] pantoprazole (PROTONIX) 20 MG EC tablet Take 1 tablet by mouth Daily. 90 tablet 1    [DISCONTINUED] rosuvastatin (CRESTOR) 40 MG tablet Take 1 tablet by mouth Every Night. 90 tablet 1    chlorhexidine (PERIDEX) 0.12 % solution  (Patient not taking: Reported on 8/11/2023)      metoprolol succinate XL (TOPROL-XL) 25 MG 24 hr tablet Take 1 tablet by mouth 2 (Two) Times a Day for 90 doses. 180 tablet 1    multivitamin (THERAGRAN) tablet tablet Take 1 tablet by mouth 2 (Two) Times a Day. (Patient not taking: Reported on 8/11/2023)      mupirocin (BACTROBAN) 2 %  ointment  (Patient not taking: Reported on 8/11/2023)       No current facility-administered medications on file prior to visit.        Assessment and Plan  Diagnoses and all orders for this visit:    1. Encounter to establish care (Primary)    2. Nausea  Comments:  Chronic nausea needing refill of Zofran.  Orders:  -     ondansetron (ZOFRAN) 8 MG tablet; Take 1 tablet by mouth Every 8 (Eight) Hours As Needed for Nausea or Vomiting.  Dispense: 30 tablet; Refill: 5    3. Gastroesophageal reflux disease with esophagitis without hemorrhage  Comments:  Continue on Protonix, patient is agreeable  Orders:  -     ondansetron (ZOFRAN) 8 MG tablet; Take 1 tablet by mouth Every 8 (Eight) Hours As Needed for Nausea or Vomiting.  Dispense: 30 tablet; Refill: 5    4. Neuropathy  Comments:  Will increase gabapentin to 300 mg twice daily, patient is agreeable  Orders:  -     gabapentin (NEURONTIN) 300 MG capsule; Take 1 capsule by mouth 2 (Two) Times a Day.  Dispense: 180 capsule; Refill: 1    5. Fibromyalgia  -     gabapentin (NEURONTIN) 300 MG capsule; Take 1 capsule by mouth 2 (Two) Times a Day.  Dispense: 180 capsule; Refill: 1    6. Cervical pain  Comments:  Ongoing issue for the past month and a half, only really feels the pain if she is moving her neck towards the left.  No midline tenderness on exam  Orders:  -     XR Spine Cervical Complete 4 or 5 View; Future    Other orders  -     aspirin (ASPIRIN LOW DOSE) 81 MG EC tablet; Take 1 tablet by mouth Daily.  Dispense: 90 tablet; Refill: 3  -     levothyroxine (SYNTHROID, LEVOTHROID) 50 MCG tablet; Take 1 tablet by mouth Every Morning.  Dispense: 90 tablet; Refill: 1  -     cetirizine (zyrTEC) 10 MG tablet; Take 1 tablet by mouth Daily.  Dispense: 90 tablet; Refill: 3  -     rosuvastatin (CRESTOR) 40 MG tablet; Take 1 tablet by mouth Every Night.  Dispense: 90 tablet; Refill: 1  -     pantoprazole (PROTONIX) 20 MG EC tablet; Take 1 tablet by mouth Daily.  Dispense: 90  tablet; Refill: 1    I did discuss with patient that I would like for her to do some stretches with her neck to see if that helps we will order x-rays since patient is osteoporotic to rule out any compression fractures.  Patient verbalized understanding agreeable current treatment plan.        Follow Up   No follow-ups on file.  Patient was given instructions and counseling regarding her condition or for health maintenance advice. Please see specific information pulled into the AVS if appropriate.       Part of this note may be electronic transcription/translation of spoken language to printed text using the Dragon dictation system

## 2023-08-15 ENCOUNTER — PATIENT ROUNDING (BHMG ONLY) (OUTPATIENT)
Dept: FAMILY MEDICINE CLINIC | Facility: CLINIC | Age: 63
End: 2023-08-15
Payer: MEDICARE

## 2023-08-15 NOTE — PROGRESS NOTES
August 15, 2023    Hello, may I speak with Ida Vincent?    My name is Timi Amaya      I am  with [facility]  [address]  [address]  [place] [facility] [address]  [phone].    Before we get started may I verify your date of birth? 1960    I am calling to officially welcome you to our practice and ask about your recent visit. Is this a good time to talk? yes    Tell me about your visit with us. What things went well?  The visit was great no complaints       We're always looking for ways to make our patients' experiences even better. Do you have recommendations on ways we may improve?  no    Overall were you satisfied with your first visit to our practice? yes       I appreciate you taking the time to speak with me today. Is there anything else I can do for you? no      Thank you, and have a great day.

## 2023-08-29 ENCOUNTER — OFFICE VISIT (OUTPATIENT)
Dept: CARDIOLOGY | Facility: CLINIC | Age: 63
End: 2023-08-29
Payer: MEDICARE

## 2023-08-29 VITALS
HEART RATE: 66 BPM | DIASTOLIC BLOOD PRESSURE: 74 MMHG | BODY MASS INDEX: 15.81 KG/M2 | HEIGHT: 65 IN | SYSTOLIC BLOOD PRESSURE: 114 MMHG

## 2023-08-29 DIAGNOSIS — E78.2 MIXED HYPERLIPIDEMIA: ICD-10-CM

## 2023-08-29 DIAGNOSIS — R00.2 PALPITATIONS: Primary | ICD-10-CM

## 2023-08-29 DIAGNOSIS — Z72.0 TOBACCO ABUSE: ICD-10-CM

## 2023-08-29 PROCEDURE — 1160F RVW MEDS BY RX/DR IN RCRD: CPT

## 2023-08-29 PROCEDURE — 99214 OFFICE O/P EST MOD 30 MIN: CPT

## 2023-08-29 PROCEDURE — 1159F MED LIST DOCD IN RCRD: CPT

## 2023-08-29 RX ORDER — UBIDECARENONE 100 MG
100 CAPSULE ORAL DAILY
COMMUNITY

## 2023-08-29 RX ORDER — THIAMINE HCL 100 MG
1 TABLET ORAL DAILY
COMMUNITY

## 2023-08-29 RX ORDER — VARENICLINE TARTRATE 1 MG/1
1 TABLET, FILM COATED ORAL 2 TIMES DAILY
Qty: 60 TABLET | Refills: 3 | Status: SHIPPED | OUTPATIENT
Start: 2023-08-29

## 2023-08-29 NOTE — PROGRESS NOTES
"Chief Complaint  Palpitations, Hyperlipidemia, and Follow-up (6 mo f/u)    Subjective        History of Present Illness  Ida Vincent presents to [facility] for follow up.  Patient is a 63-year-old  female with past medical history outlined below, significant for  hyperlipidemia and palpitations who presents for follow-up.  Today she reports that she feels well overall.  She denies any chest pain or discomfort.  She notes occasional bouts of shortness of breath with moderate exertion.  She has sporadic palpitations but usually they only occur if she misses a dose of her metoprolol.  She has no dizziness, lightheadedness, swelling, syncope.  She continues to smoke approximately half a pack to a pack of cigarettes daily.      Past Medical History:   Diagnosis Date    Abnormal ECG     Allergic     Anxiety 1999    Arthritis     Asthma     Cholelithiasis 1999    Congenital heart disease     COPD (chronic obstructive pulmonary disease)     Coronary artery disease     DDD (degenerative disc disease), lumbar     Depression 1999    Diverticulosis 2021    Fibromyalgia     Fibromyalgia, primary     GERD (gastroesophageal reflux disease)     Heart block     History of diverticulosis     History of palpitations     Hyperlipemia     Hypertension     Hypothyroidism     Low back pain     Lumbago     Osteopenia 2021    Pneumonia 1976    Premature ventricular contractions (PVCs) (VPCs)     HISTORY OF    Seasonal allergies     Slow to wake up after anesthesia     Substance abuse     Last drink Nov2019    Thyroid disorder        ALLERGY  Allergies   Allergen Reactions    Ciprofloxacin Anaphylaxis and Other (See Comments)    Thimerosal Swelling    Benadryl [Diphenhydramine] Other (See Comments)     \"It makes me climb the walls and shake\"    Prednisone Other (See Comments)     CHF  Pt stated solu-medrol is okay to take    Erythromycin Nausea And Vomiting        Past Surgical History:   Procedure " Laterality Date    ABDOMINOPLASTY      ATRIAL SEPTAL DEFECT REPAIR      BREAST AUGMENTATION      BREAST IMPLANT SURGERY Bilateral 10/18/2021    Procedure: BILATERAL REMOVAL OF IMPLANTS;  Surgeon: BRUCE Suresh MD;  Location: [facility];  Service: Plastics;  Laterality: Bilateral;    BREAST SURGERY  10/18/21    CARDIAC CATHETERIZATION      CHOLECYSTECTOMY      COLONOSCOPY      COSMETIC SURGERY      Tummy tuck    DENTAL PROCEDURE      ENDOSCOPY  2011    GALLBLADDER SURGERY      HYSTERECTOMY      MASTOPEXY Bilateral 10/18/2021    Procedure: BILATERAL MASTOPEXY;  Surgeon: BRUCE Suresh MD;  Location: [facility] OR;  Service: Plastics;  Laterality: Bilateral;    OSTEOTOMY      OTHER SURGICAL HISTORY      metal implants     OTHER SURGICAL HISTORY      surgical clips RIGHT FACE ARTERY WITH JAW SURGERY    SEPTOPLASTY      SHOULDER ARTHROSCOPIC ACROMIOCLAVICULAR (AC) JOINT RECONSTRUCTION Right     SINUS SURGERY      SPINE SURGERY      TONSILLECTOMY      TOTAL ABDOMINAL HYSTERECTOMY WITH SALPINGO OOPHORECTOMY      TVH    VERTEBROPLASTY  2021    Lumbar 2        Social History     Socioeconomic History    Marital status:    Tobacco Use    Smoking status: Every Day     Packs/day: 0.50     Years: 45.00     Pack years: 22.50     Types: Cigarettes     Last attempt to quit: 2021     Years since quittin.4    Smokeless tobacco: Never   Vaping Use    Vaping Use: Never used   Substance and Sexual Activity    Alcohol use: Not Currently    Drug use: Not Currently     Frequency: 7.0 times per week     Types: Marijuana     Comment: Patient states she uses marijuana every night-2022    Sexual activity: Not Currently     Partners: Male     Birth control/protection: Vasectomy, Hysterectomy       Family History   Problem Relation Age of Onset    Diabetes Mother     Hypertension Mother     Anxiety disorder Mother     Arthritis Mother     Alcohol abuse Mother     Thyroid disease Mother      Vision loss Mother     Hyperlipidemia Mother     Heart attack Mother     Heart disease Mother     Cancer Father     Arthritis Father     Early death Father     Mental illness Sister     Arthritis Sister     Hyperlipidemia Sister     Early death Brother     Diabetes Brother     Arthritis Brother     Liver disease Brother     Heart disease Daughter         Cardiac ablation    Other Son         Gastro Problems    Malig Hyperthermia Neg Hx         Current Outpatient Medications on File Prior to Visit   Medication Sig    albuterol sulfate  (90 Base) MCG/ACT inhaler Inhale 2 puffs Every 4 (Four) Hours As Needed.    aspirin (ASPIRIN LOW DOSE) 81 MG EC tablet Take 1 tablet by mouth Daily.    Calcium Citrate-Vitamin D3 (CITRACAL) 315-6.25 MG-MCG tablet tablet 1 tablet Daily.    cetirizine (zyrTEC) 10 MG tablet Take 1 tablet by mouth Daily.    coenzyme Q10 100 MG capsule Take 1 capsule by mouth Daily.    Denosumab (PROLIA SC) Inject  under the skin into the appropriate area as directed Every 6 (Six) Months.    Fluticasone-Umeclidin-Vilant (Trelegy Ellipta) 100-62.5-25 MCG/ACT inhaler Inhale 1 puff Every Night.    gabapentin (NEURONTIN) 300 MG capsule Take 1 capsule by mouth 2 (Two) Times a Day.    levothyroxine (SYNTHROID, LEVOTHROID) 50 MCG tablet Take 1 tablet by mouth Every Morning.    metoprolol succinate XL (TOPROL-XL) 25 MG 24 hr tablet Take 1 tablet by mouth 2 (Two) Times a Day for 90 doses.    metoprolol succinate XL (TOPROL-XL) 50 MG 24 hr tablet Take 1 tablet by mouth 2 (Two) Times a Day.    ondansetron (ZOFRAN) 8 MG tablet Take 1 tablet by mouth Every 8 (Eight) Hours As Needed for Nausea or Vomiting.    pantoprazole (PROTONIX) 20 MG EC tablet Take 1 tablet by mouth Daily.    QUEtiapine (SEROquel) 100 MG tablet Take 1 tablet by mouth Every Night.    rosuvastatin (CRESTOR) 40 MG tablet Take 1 tablet by mouth Every Night.    traZODone (DESYREL) 100 MG tablet Take 2 tablets by mouth Every Night.     "[DISCONTINUED] varenicline (Chantix Continuing Month Pak) 1 MG tablet Take 1 tablet by mouth 2 (Two) Times a Day.    [DISCONTINUED] chlorhexidine (PERIDEX) 0.12 % solution  (Patient not taking: Reported on 8/11/2023)    [DISCONTINUED] multivitamin (THERAGRAN) tablet tablet Take 1 tablet by mouth 2 (Two) Times a Day. (Patient not taking: Reported on 8/11/2023)    [DISCONTINUED] mupirocin (BACTROBAN) 2 % ointment  (Patient not taking: Reported on 8/11/2023)     No current facility-administered medications on file prior to visit.       Objective   Vitals:    08/29/23 1301   BP: 114/74   Pulse: 66   Height: 165.1 cm (65\")       Physical Exam  Constitutional:       General: She is awake. She is not in acute distress.     Appearance: Normal appearance.   HENT:      Head: Normocephalic.      Nose: Nose normal. No congestion.   Eyes:      Extraocular Movements: Extraocular movements intact.      Conjunctiva/sclera: Conjunctivae normal.      Pupils: Pupils are equal, round, and reactive to light.   Neck:      Thyroid: No thyromegaly.      Vascular: No JVD.   Cardiovascular:      Rate and Rhythm: Normal rate and regular rhythm.      Chest Wall: PMI is not displaced.      Pulses: Normal pulses.      Heart sounds: Normal heart sounds, S1 normal and S2 normal. No murmur heard.    No friction rub. No gallop. No S3 or S4 sounds.   Pulmonary:      Effort: Pulmonary effort is normal.      Breath sounds: Normal breath sounds. No wheezing, rhonchi or rales.   Abdominal:      General: Bowel sounds are normal.      Palpations: Abdomen is soft.      Tenderness: There is no abdominal tenderness.   Musculoskeletal:      Cervical back: No tenderness.      Right lower leg: No edema.      Left lower leg: No edema.   Lymphadenopathy:      Cervical: No cervical adenopathy.   Skin:     General: Skin is warm and dry.      Capillary Refill: Capillary refill takes less than 2 seconds.      Coloration: Skin is not cyanotic.      Findings: No " petechiae or rash.      Nails: There is no clubbing.   Neurological:      Mental Status: She is alert.   Psychiatric:         Mood and Affect: Mood normal.         Behavior: Behavior is cooperative.         Result Review     The following data was reviewed by BRYANT Akers on 08/29/23.    No results found for: PROBNP  CMP          1/6/2023    15:38 2/7/2023    16:23   CMP   Glucose 90  87    BUN 4  5    Creatinine 0.67  0.53    EGFR 100.2  106.0    Sodium 141  139    Potassium 4.3  4.3    Chloride 104  102    Calcium 9.9  9.2    Total Protein 7.3  7.3    Albumin 4.8  5.0    Globulin 2.5  2.3    Total Bilirubin 0.4  0.2    Alkaline Phosphatase 59  68    AST (SGOT) 16  15    ALT (SGPT) 12  8    Albumin/Globulin Ratio 1.9  2.2    BUN/Creatinine Ratio 6.0  9.4    Anion Gap 9.1  11.8      CBC w/diff          1/6/2023    15:38   CBC w/Diff   WBC 8.47    RBC 4.75    Hemoglobin 14.0    Hematocrit 41.7    MCV 87.8    MCH 29.5    MCHC 33.6    RDW 12.7    Platelets 302    Neutrophil Rel % 50.0    Immature Granulocyte Rel % 0.6    Lymphocyte Rel % 41.1    Monocyte Rel % 7.0    Eosinophil Rel % 0.8    Basophil Rel % 0.5       Lipid Panel          1/6/2023    15:38   Lipid Panel   Total Cholesterol 154    Triglycerides 119    HDL Cholesterol 43    VLDL Cholesterol 21    LDL Cholesterol  90    LDL/HDL Ratio 2.03                 Procedures    Assessment & Plan  Diagnoses and all orders for this visit:    1. Palpitations (Primary)    2. Mixed hyperlipidemia    3. Tobacco abuse    Other orders  -     varenicline (Chantix Continuing Month Blu) 1 MG tablet; Take 1 tablet by mouth 2 (Two) Times a Day.  Dispense: 60 tablet; Refill: 3    1.  Her palpitations have improved significantly on 50 mg of metoprolol twice daily.  Continue the same.  2.  Cholesterol is well controlled.  Continue rosuvastatin 40 mg daily.  3.  Encouraged tobacco cessation.  She is willing to try Chantix again.  Medication was sent to her  pharmacy.      Follow Up   Return in about 1 year (around 8/29/2024) for With Dr. Vang.    Patient was given instructions and counseling regarding her condition or for health maintenance advice. Please see specific information pulled into the AVS if appropriate.     Xochilt Kim, BRYANT  08/29/23  13:13 EDT    Dictated Utilizing Dragon Dictation

## 2023-09-19 ENCOUNTER — HOSPITAL ENCOUNTER (OUTPATIENT)
Dept: INFUSION THERAPY | Facility: HOSPITAL | Age: 63
Discharge: HOME | End: 2023-09-19
Payer: MEDICARE

## 2023-09-19 ENCOUNTER — HOSPITAL ENCOUNTER (OUTPATIENT)
Dept: GENERAL RADIOLOGY | Facility: HOSPITAL | Age: 63
Discharge: HOME | End: 2023-09-19
Payer: MEDICARE

## 2023-09-19 VITALS
WEIGHT: 98.55 LBS | RESPIRATION RATE: 20 BRPM | TEMPERATURE: 97.3 F | HEIGHT: 65 IN | SYSTOLIC BLOOD PRESSURE: 98 MMHG | BODY MASS INDEX: 16.42 KG/M2 | DIASTOLIC BLOOD PRESSURE: 42 MMHG | HEART RATE: 61 BPM | OXYGEN SATURATION: 98 %

## 2023-09-19 DIAGNOSIS — M54.2 CERVICAL PAIN: ICD-10-CM

## 2023-09-19 DIAGNOSIS — M81.0 AGE-RELATED OSTEOPOROSIS WITHOUT CURRENT PATHOLOGICAL FRACTURE: Primary | ICD-10-CM

## 2023-09-19 LAB
25(OH)D3 SERPL-MCNC: 66.6 NG/ML (ref 30–100)
ALBUMIN SERPL-MCNC: 4.5 G/DL (ref 3.5–5.2)
ALBUMIN/GLOB SERPL: 2 G/DL
ALP SERPL-CCNC: 47 U/L (ref 39–117)
ALT SERPL W P-5'-P-CCNC: 14 U/L (ref 1–33)
ANION GAP SERPL CALCULATED.3IONS-SCNC: 8.3 MMOL/L (ref 5–15)
AST SERPL-CCNC: 17 U/L (ref 1–32)
BILIRUB SERPL-MCNC: 0.2 MG/DL (ref 0–1.2)
BUN SERPL-MCNC: 11 MG/DL (ref 8–23)
BUN/CREAT SERPL: 19 (ref 7–25)
CALCIUM SPEC-SCNC: 9.2 MG/DL (ref 8.6–10.5)
CHLORIDE SERPL-SCNC: 101 MMOL/L (ref 98–107)
CO2 SERPL-SCNC: 27.7 MMOL/L (ref 22–29)
CREAT SERPL-MCNC: 0.58 MG/DL (ref 0.57–1)
EGFRCR SERPLBLD CKD-EPI 2021: 103.1 ML/MIN/1.73
GLOBULIN UR ELPH-MCNC: 2.2 GM/DL
GLUCOSE SERPL-MCNC: 92 MG/DL (ref 65–99)
MAGNESIUM SERPL-MCNC: 2.2 MG/DL (ref 1.6–2.4)
PHOSPHATE SERPL-MCNC: 3.3 MG/DL (ref 2.5–4.5)
POTASSIUM SERPL-SCNC: 4.6 MMOL/L (ref 3.5–5.2)
PROT SERPL-MCNC: 6.7 G/DL (ref 6–8.5)
SODIUM SERPL-SCNC: 137 MMOL/L (ref 136–145)

## 2023-09-19 PROCEDURE — 72050 X-RAY EXAM NECK SPINE 4/5VWS: CPT

## 2023-09-19 PROCEDURE — 36415 COLL VENOUS BLD VENIPUNCTURE: CPT

## 2023-09-19 PROCEDURE — 83735 ASSAY OF MAGNESIUM: CPT | Performed by: NURSE PRACTITIONER

## 2023-09-19 PROCEDURE — 82306 VITAMIN D 25 HYDROXY: CPT | Performed by: NURSE PRACTITIONER

## 2023-09-19 PROCEDURE — 80053 COMPREHEN METABOLIC PANEL: CPT | Performed by: NURSE PRACTITIONER

## 2023-09-19 PROCEDURE — 96372 THER/PROPH/DIAG INJ SC/IM: CPT

## 2023-09-19 PROCEDURE — 25010000002 DENOSUMAB 60 MG/ML SOLUTION PREFILLED SYRINGE: Performed by: NURSE PRACTITIONER

## 2023-09-19 PROCEDURE — 84100 ASSAY OF PHOSPHORUS: CPT | Performed by: NURSE PRACTITIONER

## 2023-09-19 RX ADMIN — DENOSUMAB 60 MG: 60 INJECTION SUBCUTANEOUS at 14:47

## 2023-10-16 ENCOUNTER — HOSPITAL ENCOUNTER (OUTPATIENT)
Dept: MAMMOGRAPHY | Facility: HOSPITAL | Age: 63
Discharge: HOME | End: 2023-10-16
Admitting: NURSE PRACTITIONER
Payer: MEDICARE

## 2023-10-16 DIAGNOSIS — Z12.31 SCREENING MAMMOGRAM FOR BREAST CANCER: ICD-10-CM

## 2023-10-16 PROCEDURE — 77063 BREAST TOMOSYNTHESIS BI: CPT

## 2023-10-16 PROCEDURE — 77067 SCR MAMMO BI INCL CAD: CPT

## 2023-11-08 ENCOUNTER — TELEPHONE (OUTPATIENT)
Dept: FAMILY MEDICINE CLINIC | Facility: CLINIC | Age: 63
End: 2023-11-08
Payer: MEDICARE

## 2023-11-08 DIAGNOSIS — E03.9 ACQUIRED HYPOTHYROIDISM: Primary | ICD-10-CM

## 2023-11-08 NOTE — TELEPHONE ENCOUNTER
Left voicemail for patient that Jennifer placed the thyroid labs for her and she can get it done at her convenience.

## 2023-11-08 NOTE — TELEPHONE ENCOUNTER
Patient came in to office yesterday inquiring about getting labs drawn before her transfer of care appt with you on 11/13/2023. She is a previous [facility] K[facility]ney pt. She is especially worried about having her thyroid labs done and any other ones that need to be completed before her appt with you. Thank you.

## 2023-11-13 ENCOUNTER — OFFICE VISIT (OUTPATIENT)
Dept: FAMILY MEDICINE CLINIC | Facility: CLINIC | Age: 63
End: 2023-11-13
Payer: MEDICARE

## 2023-11-13 VITALS
BODY MASS INDEX: 17 KG/M2 | HEART RATE: 69 BPM | WEIGHT: 102 LBS | TEMPERATURE: 97.5 F | HEIGHT: 65 IN | DIASTOLIC BLOOD PRESSURE: 52 MMHG | OXYGEN SATURATION: 97 % | SYSTOLIC BLOOD PRESSURE: 92 MMHG

## 2023-11-13 DIAGNOSIS — M81.0 AGE-RELATED OSTEOPOROSIS WITHOUT CURRENT PATHOLOGICAL FRACTURE: ICD-10-CM

## 2023-11-13 DIAGNOSIS — M51.36 DDD (DEGENERATIVE DISC DISEASE), LUMBAR: ICD-10-CM

## 2023-11-13 DIAGNOSIS — E03.9 ACQUIRED HYPOTHYROIDISM: ICD-10-CM

## 2023-11-13 DIAGNOSIS — M43.06 LUMBAR SPONDYLOLYSIS: ICD-10-CM

## 2023-11-13 DIAGNOSIS — J44.9 CHRONIC OBSTRUCTIVE PULMONARY DISEASE, UNSPECIFIED COPD TYPE: ICD-10-CM

## 2023-11-13 DIAGNOSIS — E55.9 VITAMIN D DEFICIENCY: ICD-10-CM

## 2023-11-13 DIAGNOSIS — Z09 FOLLOW-UP EXAM: Primary | ICD-10-CM

## 2023-11-13 DIAGNOSIS — E78.5 HYPERLIPIDEMIA, UNSPECIFIED HYPERLIPIDEMIA TYPE: ICD-10-CM

## 2023-11-13 DIAGNOSIS — M79.7 FIBROMYALGIA: ICD-10-CM

## 2023-11-13 DIAGNOSIS — G62.9 NEUROPATHY: ICD-10-CM

## 2023-11-13 LAB
25(OH)D3 SERPL-MCNC: 65.6 NG/ML (ref 30–100)
ALBUMIN SERPL-MCNC: 4.9 G/DL (ref 3.5–5.2)
ALBUMIN/GLOB SERPL: 2.1 G/DL
ALP SERPL-CCNC: 40 U/L (ref 39–117)
ALT SERPL W P-5'-P-CCNC: 15 U/L (ref 1–33)
ANION GAP SERPL CALCULATED.3IONS-SCNC: 9 MMOL/L (ref 5–15)
AST SERPL-CCNC: 20 U/L (ref 1–32)
BASOPHILS # BLD AUTO: 0.05 10*3/MM3 (ref 0–0.2)
BASOPHILS NFR BLD AUTO: 0.7 % (ref 0–1.5)
BILIRUB SERPL-MCNC: 0.3 MG/DL (ref 0–1.2)
BUN SERPL-MCNC: 9 MG/DL (ref 8–23)
BUN/CREAT SERPL: 13.4 (ref 7–25)
CALCIUM SPEC-SCNC: 9.4 MG/DL (ref 8.6–10.5)
CHLORIDE SERPL-SCNC: 100 MMOL/L (ref 98–107)
CHOLEST SERPL-MCNC: 203 MG/DL (ref 0–200)
CO2 SERPL-SCNC: 29 MMOL/L (ref 22–29)
CREAT SERPL-MCNC: 0.67 MG/DL (ref 0.57–1)
DEPRECATED RDW RBC AUTO: 40 FL (ref 37–54)
EGFRCR SERPLBLD CKD-EPI 2021: 99.6 ML/MIN/1.73
EOSINOPHIL # BLD AUTO: 0.13 10*3/MM3 (ref 0–0.4)
EOSINOPHIL NFR BLD AUTO: 1.9 % (ref 0.3–6.2)
ERYTHROCYTE [DISTWIDTH] IN BLOOD BY AUTOMATED COUNT: 12.2 % (ref 12.3–15.4)
GLOBULIN UR ELPH-MCNC: 2.3 GM/DL
GLUCOSE SERPL-MCNC: 75 MG/DL (ref 65–99)
HCT VFR BLD AUTO: 41.9 % (ref 34–46.6)
HDLC SERPL-MCNC: 66 MG/DL (ref 40–60)
HGB BLD-MCNC: 14 G/DL (ref 12–15.9)
IMM GRANULOCYTES # BLD AUTO: 0.03 10*3/MM3 (ref 0–0.05)
IMM GRANULOCYTES NFR BLD AUTO: 0.4 % (ref 0–0.5)
LDLC SERPL CALC-MCNC: 113 MG/DL (ref 0–100)
LDLC/HDLC SERPL: 1.66 {RATIO}
LYMPHOCYTES # BLD AUTO: 3.02 10*3/MM3 (ref 0.7–3.1)
LYMPHOCYTES NFR BLD AUTO: 44.3 % (ref 19.6–45.3)
MAGNESIUM SERPL-MCNC: 2.1 MG/DL (ref 1.6–2.4)
MCH RBC QN AUTO: 30.2 PG (ref 26.6–33)
MCHC RBC AUTO-ENTMCNC: 33.4 G/DL (ref 31.5–35.7)
MCV RBC AUTO: 90.5 FL (ref 79–97)
MONOCYTES # BLD AUTO: 0.41 10*3/MM3 (ref 0.1–0.9)
MONOCYTES NFR BLD AUTO: 6 % (ref 5–12)
NEUTROPHILS NFR BLD AUTO: 3.18 10*3/MM3 (ref 1.7–7)
NEUTROPHILS NFR BLD AUTO: 46.7 % (ref 42.7–76)
NRBC BLD AUTO-RTO: 0 /100 WBC (ref 0–0.2)
PHOSPHATE SERPL-MCNC: 3.6 MG/DL (ref 2.5–4.5)
PLATELET # BLD AUTO: 190 10*3/MM3 (ref 140–450)
PMV BLD AUTO: 11.2 FL (ref 6–12)
POTASSIUM SERPL-SCNC: 4.6 MMOL/L (ref 3.5–5.2)
PROT SERPL-MCNC: 7.2 G/DL (ref 6–8.5)
RBC # BLD AUTO: 4.63 10*6/MM3 (ref 3.77–5.28)
SODIUM SERPL-SCNC: 138 MMOL/L (ref 136–145)
T-UPTAKE NFR SERPL: 1.16 TBI (ref 0.8–1.3)
T4 SERPL-MCNC: 8.17 MCG/DL (ref 4.5–11.7)
TRIGL SERPL-MCNC: 138 MG/DL (ref 0–150)
TSH SERPL DL<=0.05 MIU/L-ACNC: 1.88 UIU/ML (ref 0.27–4.2)
VLDLC SERPL-MCNC: 24 MG/DL (ref 5–40)
WBC NRBC COR # BLD: 6.82 10*3/MM3 (ref 3.4–10.8)

## 2023-11-13 PROCEDURE — 84479 ASSAY OF THYROID (T3 OR T4): CPT | Performed by: NURSE PRACTITIONER

## 2023-11-13 PROCEDURE — 80061 LIPID PANEL: CPT | Performed by: NURSE PRACTITIONER

## 2023-11-13 PROCEDURE — 84100 ASSAY OF PHOSPHORUS: CPT | Performed by: NURSE PRACTITIONER

## 2023-11-13 PROCEDURE — 82306 VITAMIN D 25 HYDROXY: CPT | Performed by: NURSE PRACTITIONER

## 2023-11-13 PROCEDURE — 84443 ASSAY THYROID STIM HORMONE: CPT | Performed by: NURSE PRACTITIONER

## 2023-11-13 PROCEDURE — 83735 ASSAY OF MAGNESIUM: CPT | Performed by: NURSE PRACTITIONER

## 2023-11-13 PROCEDURE — 84436 ASSAY OF TOTAL THYROXINE: CPT | Performed by: NURSE PRACTITIONER

## 2023-11-13 PROCEDURE — 80053 COMPREHEN METABOLIC PANEL: CPT | Performed by: NURSE PRACTITIONER

## 2023-11-13 PROCEDURE — 85025 COMPLETE CBC W/AUTO DIFF WBC: CPT | Performed by: NURSE PRACTITIONER

## 2023-11-13 RX ORDER — METOPROLOL SUCCINATE 50 MG/1
50 TABLET, EXTENDED RELEASE ORAL 2 TIMES DAILY
Qty: 180 TABLET | Refills: 1 | Status: SHIPPED | OUTPATIENT
Start: 2023-11-13

## 2023-11-13 RX ORDER — METOPROLOL SUCCINATE 25 MG/1
25 TABLET, EXTENDED RELEASE ORAL 2 TIMES DAILY
Qty: 180 TABLET | Refills: 1 | Status: SHIPPED | OUTPATIENT
Start: 2023-11-13

## 2023-11-13 NOTE — PROGRESS NOTES
Chief Complaint  MRI Request (XR Spine Cervical Complete 4 or 5 View (09/19/2023 15:10), pt is open to Physical therapy)    Subjective        Medical History: has a past medical history of Abnormal ECG, Allergic, Anxiety (1999), Arthritis, Asthma, Cholelithiasis (1999), Congenital heart disease, COPD (chronic obstructive pulmonary disease), Coronary artery disease, DDD (degenerative disc disease), lumbar, Depression (1999), Diverticulosis (2021), Fibromyalgia, Fibromyalgia, primary, GERD (gastroesophageal reflux disease), Heart block, History of diverticulosis, History of palpitations, Hyperlipemia, Hypothyroidism, Low back pain, Lumbago, Osteopenia (2021), Pneumonia (1976), Premature ventricular contractions (PVCs) (VPCs), Seasonal allergies, Slow to wake up after anesthesia, Substance abuse, and Thyroid disorder.     Surgical History: has a past surgical history that includes Vertebroplasty (06/04/2021); Tonsillectomy; Hysterectomy; shoulder arthroscopic acromioclavicular (ac) joint reconstruction (Right); Cardiac catheterization; Breast Augmentation; Osteotomy; ASD repair; Dental surgery; Colonoscopy (2011); Esophagogastroduodenoscopy (2011); Gallbladder surgery; Other surgical history; Other surgical history; Abdominoplasty; Mastopexy (Bilateral, 10/18/2021); Breast Implant Revision (Bilateral, 10/18/2021); Breast surgery (10/18/21); Cholecystectomy (1999); Cosmetic surgery; Septoplasty (2010); Spine surgery (2021); Total abdominal hysterectomy w/ bilateral salpingoophorectomy; and Sinus surgery.     Family History: family history includes Alcohol abuse in her mother; Anxiety disorder in her mother; Arthritis in her brother, father, mother, and sister; Cancer in her father; Diabetes in her brother and mother; Early death in her brother and father; Heart attack in her mother; Heart disease in her daughter and mother; Hyperlipidemia in her mother and sister; Hypertension in her mother; Liver disease in her  brother; Mental illness in her sister; Other in her son; Thyroid disease in her mother; Vision loss in her mother.     Social History: reports that she has been smoking cigarettes. She has a 22.50 pack-year smoking history. She has never used smokeless tobacco. She reports that she does not currently use alcohol. She reports that she does not currently use drugs after having used the following drugs: Marijuana. Frequency: 7.00 times per week.    Ida Vincent presents to [facility]  Hyperlipidemia  This is a chronic problem. The current episode started more than 1 year ago. The problem is controlled. Pertinent negatives include no chest pain or shortness of breath. Current antihyperlipidemic treatment includes statins. The current treatment provides significant improvement of lipids. There are no compliance problems.  Risk factors for coronary artery disease include dyslipidemia and post-menopausal.   Back Pain  This is a chronic problem. The current episode started more than 1 year ago. The problem has been waxing and waning since onset. The pain is present in the lumbar spine. The quality of the pain is described as shooting. The pain radiates to the right thigh and left thigh. The pain is at a severity of 5/10. The pain is moderate. Pertinent negatives include no bladder incontinence, bowel incontinence or chest pain. Risk factors include history of osteoporosis. She has tried home exercises, NSAIDs and muscle relaxant for the symptoms. The treatment provided no relief.       She has a current past medical history of fibromyalgia, hypothyroidism, heart palpitations, is currently stable on metoprolol, age-related osteoporosis, currently on Prolia, current nicotine every day 1/4 pack user and current user of THC nightly to help with sleep.     Patient is requesting refills of her aspirin, Zofran, levothyroxine , Protonix, gabapentin, and Zyrtec.  Most recent UDS done May 2023 she  "was started on 300 mg of gabapentin 2 times a day, patient states she does not feel like it is helping and she never remembers to take the third dose in the afternoon.       Objective   Vital Signs:   BP 92/52 (BP Location: Left arm, Patient Position: Sitting, Cuff Size: Adult)   Pulse 69   Temp 97.5 °F (36.4 °C) (Temporal)   Ht 165.1 cm (65\")   Wt 46.3 kg (102 lb)   SpO2 97%   BMI 16.97 kg/m²       Wt Readings from Last 3 Encounters:   11/13/23 46.3 kg (102 lb)   09/19/23 44.7 kg (98 lb 8.7 oz)   08/11/23 43.1 kg (95 lb)        BP Readings from Last 3 Encounters:   11/13/23 92/52   09/19/23 98/42   08/29/23 114/74      Physical Exam  Vitals reviewed.   Constitutional:       Appearance: Normal appearance. She is well-developed, well-groomed and underweight. She is not ill-appearing or toxic-appearing.   HENT:      Head: Normocephalic and atraumatic.   Eyes:      Conjunctiva/sclera: Conjunctivae normal.      Pupils: Pupils are equal, round, and reactive to light.   Cardiovascular:      Rate and Rhythm: Normal rate and regular rhythm.      Heart sounds: No murmur heard.  Pulmonary:      Effort: Pulmonary effort is normal.      Breath sounds: Normal breath sounds. No wheezing or rhonchi.   Musculoskeletal:      Right lower leg: No edema.      Left lower leg: No edema.   Skin:     General: Skin is warm and dry.   Neurological:      Mental Status: She is alert and oriented to person, place, and time.   Psychiatric:         Mood and Affect: Mood and affect normal.         Behavior: Behavior normal.         Thought Content: Thought content normal.         Judgment: Judgment normal.        Result Review :  {The following data was reviewed by BRYANT Ruelas on 11/13/2023.  Common labs          1/6/2023    15:38 2/7/2023    16:23 9/19/2023    14:17   Common Labs   Glucose 90  87  92    BUN 4  5  11    Creatinine 0.67  0.53  0.58    Sodium 141  139  137    Potassium 4.3  4.3  4.6    Chloride 104  102  101  "   Calcium 9.9  9.2  9.2    Albumin 4.8  5.0  4.5    Total Bilirubin 0.4  0.2  0.2    Alkaline Phosphatase 59  68  47    AST (SGOT) 16  15  17    ALT (SGPT) 12  8  14    WBC 8.47      Hemoglobin 14.0      Hematocrit 41.7      Platelets 302      Total Cholesterol 154      Triglycerides 119      HDL Cholesterol 43      LDL Cholesterol  90                    Current Outpatient Medications on File Prior to Visit   Medication Sig Dispense Refill    albuterol sulfate  (90 Base) MCG/ACT inhaler Inhale 2 puffs Every 4 (Four) Hours As Needed.      aspirin (ASPIRIN LOW DOSE) 81 MG EC tablet Take 1 tablet by mouth Daily. 90 tablet 3    Calcium Citrate-Vitamin D3 (CITRACAL) 315-6.25 MG-MCG tablet tablet 1 tablet Daily.      cetirizine (zyrTEC) 10 MG tablet Take 1 tablet by mouth Daily. 90 tablet 3    coenzyme Q10 100 MG capsule Take 1 capsule by mouth Daily.      Denosumab (PROLIA SC) Inject  under the skin into the appropriate area as directed Every 6 (Six) Months.      Fluticasone-Umeclidin-Vilant (Trelegy Ellipta) 100-62.5-25 MCG/ACT inhaler Inhale 1 puff Every Night. 60 each 2    gabapentin (NEURONTIN) 300 MG capsule Take 1 capsule by mouth 2 (Two) Times a Day. 180 capsule 1    levothyroxine (SYNTHROID, LEVOTHROID) 50 MCG tablet Take 1 tablet by mouth Every Morning. 90 tablet 1    ondansetron (ZOFRAN) 8 MG tablet Take 1 tablet by mouth Every 8 (Eight) Hours As Needed for Nausea or Vomiting. 30 tablet 5    pantoprazole (PROTONIX) 20 MG EC tablet Take 1 tablet by mouth Daily. 90 tablet 1    QUEtiapine (SEROquel) 100 MG tablet Take 1 tablet by mouth Every Night.      rosuvastatin (CRESTOR) 40 MG tablet Take 1 tablet by mouth Every Night. 90 tablet 1    traZODone (DESYREL) 100 MG tablet Take 2 tablets by mouth Every Night.      varenicline (Chantix Continuing Month Blu) 1 MG tablet Take 1 tablet by mouth 2 (Two) Times a Day. 60 tablet 3    [DISCONTINUED] metoprolol succinate XL (TOPROL-XL) 50 MG 24 hr tablet Take 1 tablet  by mouth 2 (Two) Times a Day. 180 tablet 3    [DISCONTINUED] metoprolol succinate XL (TOPROL-XL) 25 MG 24 hr tablet Take 1 tablet by mouth 2 (Two) Times a Day for 90 doses. 180 tablet 1     No current facility-administered medications on file prior to visit.      Pain Management Panel          Latest Ref Rng & Units 5/9/2023   Pain Management Panel   Amphetamine, Urine Qual Negative Negative    Barbiturates Screen, Urine Negative Negative    Benzodiazepine Screen, Urine Negative Negative    Buprenorphine, Screen, Urine Negative Negative    Cocaine Screen, Urine Negative Negative    Methadone Screen , Urine Negative Negative    Methamphetamine, Ur Negative Negative       Assessment and Plan  Diagnoses and all orders for this visit:    1. Follow-up exam (Primary)    2. Neuropathy    3. Fibromyalgia    4. Chronic obstructive pulmonary disease, unspecified COPD type    5. Hyperlipidemia, unspecified hyperlipidemia type  -     CBC & Differential  -     Lipid Panel    6. Vitamin D deficiency    7. Lumbar spondylolysis  -     MRI Lumbar Spine Without Contrast; Future    8. DDD (degenerative disc disease), lumbar  -     MRI Lumbar Spine Without Contrast; Future    9. Age-related osteoporosis without current pathological fracture  -     Comprehensive Metabolic Panel  -     Magnesium  -     Phosphorus  -     Cancel: Calcium  -     Cancel: Vitamin D 25 Hydroxy    10. Acquired hypothyroidism  -     Thyroid Panel With TSH    Other orders  -     denosumab (PROLIA) syringe 60 mg  -     metoprolol succinate XL (TOPROL-XL) 25 MG 24 hr tablet; Take 1 tablet by mouth 2 (Two) Times a Day.  Dispense: 180 tablet; Refill: 1  -     metoprolol succinate XL (TOPROL-XL) 50 MG 24 hr tablet; Take 1 tablet by mouth 2 (Two) Times a Day.  Dispense: 180 tablet; Refill: 1    Continue on current medication, I did reorder Prolia for patient since its needs to be under my name and.[facility] Javier [facility]ney, she is due for her next injection in March 2024.  Will  order MRI back due to the spondylitis and degenerative disc disease.  Let patient follow-up in 6 months.  Patient verbalized understanding.    Follow Up   Return in about 6 months (around 5/13/2024) for Recheck.  Patient was given instructions and counseling regarding her condition or for health maintenance advice. Please see specific information pulled into the AVS if appropriate.       Part of this note may be electronic transcription/translation of spoken language to printed text using the Dragon dictation system

## 2023-11-15 ENCOUNTER — TELEPHONE (OUTPATIENT)
Dept: FAMILY MEDICINE CLINIC | Facility: CLINIC | Age: 63
End: 2023-11-15
Payer: MEDICARE

## 2023-11-15 NOTE — TELEPHONE ENCOUNTER
Caller: [facility] - [place], [place] - [address] [phone] [facility] [phone]     Relationship: Pharmacy    What was the call regarding: PHARMACY STATES THERE ARE TWO STRENGTHS OF METOPROLOL SENT IN FOR PATIENT (25 AND 50 MG). PHARMACY DR GALINDO PREVIOUSLY HAD THE PATIENT ON 50 MG ONLY SO THEY WANTED TO MAKE SURE THE PATIENT WAS SUPPOSED TO BE ON BOTH.

## 2023-12-20 ENCOUNTER — HOSPITAL ENCOUNTER (OUTPATIENT)
Dept: MRI IMAGING | Facility: HOSPITAL | Age: 63
Discharge: HOME | End: 2023-12-20
Admitting: NURSE PRACTITIONER
Payer: MEDICARE

## 2023-12-20 DIAGNOSIS — M51.36 DDD (DEGENERATIVE DISC DISEASE), LUMBAR: ICD-10-CM

## 2023-12-20 DIAGNOSIS — M43.06 LUMBAR SPONDYLOLYSIS: ICD-10-CM

## 2023-12-20 PROCEDURE — 72148 MRI LUMBAR SPINE W/O DYE: CPT

## 2024-02-22 ENCOUNTER — TELEPHONE (OUTPATIENT)
Dept: FAMILY MEDICINE CLINIC | Facility: CLINIC | Age: 64
End: 2024-02-22
Payer: MEDICARE

## 2024-02-22 ENCOUNTER — TRANSCRIBE ORDERS (OUTPATIENT)
Dept: ADMINISTRATIVE | Facility: HOSPITAL | Age: 64
End: 2024-02-22
Payer: MEDICARE

## 2024-02-22 DIAGNOSIS — Z79.899 MEDICATION MANAGEMENT: ICD-10-CM

## 2024-02-22 DIAGNOSIS — M81.0 AGE-RELATED OSTEOPOROSIS WITHOUT CURRENT PATHOLOGICAL FRACTURE: Primary | ICD-10-CM

## 2024-02-22 RX ORDER — LEVOTHYROXINE SODIUM 0.05 MG/1
50 TABLET ORAL
Qty: 90 TABLET | Refills: 1 | Status: SHIPPED | OUTPATIENT
Start: 2024-02-22

## 2024-02-22 NOTE — TELEPHONE ENCOUNTER
Caller: Ida Vincent    Relationship: Self    Best call back number: [phone]     What is the best time to reach you: ANYTIME     Who are you requesting to speak with (clinical staff, provider,  specific staff member): CLINICAL       What was the call regarding: PATIENT IS CALLING REQUESTING AN ACTIVE LAB ORDER, OR TO CONFIRM IF ORDER IS ACTIVE PRIOR TO PROLIA SHOT.

## 2024-02-22 NOTE — TELEPHONE ENCOUNTER
Spoke to Ida to notify of new labs order and patient would like to request additional labs - Lipids, TSH + Free T4 and any other appropriate labs prior to scheduled appt on 03/21/2024 with PCP.

## 2024-02-28 NOTE — TELEPHONE ENCOUNTER
Patient notified via phone call orders have been placed by pcp and given pcp guidence.  Pateint voiced understanding.

## 2024-03-13 ENCOUNTER — CLINICAL SUPPORT (OUTPATIENT)
Dept: FAMILY MEDICINE CLINIC | Facility: CLINIC | Age: 64
End: 2024-03-13
Payer: MEDICARE

## 2024-03-13 DIAGNOSIS — M81.0 AGE-RELATED OSTEOPOROSIS WITHOUT CURRENT PATHOLOGICAL FRACTURE: ICD-10-CM

## 2024-03-13 DIAGNOSIS — Z79.899 MEDICATION MANAGEMENT: ICD-10-CM

## 2024-03-13 LAB
25(OH)D3 SERPL-MCNC: 66 NG/ML (ref 30–100)
ALBUMIN SERPL-MCNC: 4.7 G/DL (ref 3.5–5.2)
ALBUMIN/GLOB SERPL: 2 G/DL
ALP SERPL-CCNC: 43 U/L (ref 39–117)
ALT SERPL W P-5'-P-CCNC: 9 U/L (ref 1–33)
ANION GAP SERPL CALCULATED.3IONS-SCNC: 15.4 MMOL/L (ref 5–15)
AST SERPL-CCNC: 16 U/L (ref 1–32)
BILIRUB SERPL-MCNC: 0.2 MG/DL (ref 0–1.2)
BUN SERPL-MCNC: 7 MG/DL (ref 8–23)
BUN/CREAT SERPL: 10.1 (ref 7–25)
CALCIUM SPEC-SCNC: 9.1 MG/DL (ref 8.6–10.5)
CHLORIDE SERPL-SCNC: 98 MMOL/L (ref 98–107)
CO2 SERPL-SCNC: 24.6 MMOL/L (ref 22–29)
CREAT SERPL-MCNC: 0.69 MG/DL (ref 0.57–1)
EGFRCR SERPLBLD CKD-EPI 2021: 98.9 ML/MIN/1.73
GLOBULIN UR ELPH-MCNC: 2.4 GM/DL
GLUCOSE SERPL-MCNC: 83 MG/DL (ref 65–99)
MAGNESIUM SERPL-MCNC: 2.3 MG/DL (ref 1.6–2.4)
PHOSPHATE SERPL-MCNC: 3 MG/DL (ref 2.5–4.5)
POTASSIUM SERPL-SCNC: 4.4 MMOL/L (ref 3.5–5.2)
PROT SERPL-MCNC: 7.1 G/DL (ref 6–8.5)
SODIUM SERPL-SCNC: 138 MMOL/L (ref 136–145)

## 2024-03-13 PROCEDURE — 84100 ASSAY OF PHOSPHORUS: CPT | Performed by: NURSE PRACTITIONER

## 2024-03-13 PROCEDURE — 80053 COMPREHEN METABOLIC PANEL: CPT | Performed by: NURSE PRACTITIONER

## 2024-03-13 PROCEDURE — 82306 VITAMIN D 25 HYDROXY: CPT | Performed by: NURSE PRACTITIONER

## 2024-03-13 PROCEDURE — 83735 ASSAY OF MAGNESIUM: CPT | Performed by: NURSE PRACTITIONER

## 2024-03-19 ENCOUNTER — HOSPITAL ENCOUNTER (OUTPATIENT)
Dept: INFUSION THERAPY | Facility: HOSPITAL | Age: 64
Discharge: HOME | End: 2024-03-19
Admitting: NURSE PRACTITIONER
Payer: MEDICARE

## 2024-03-19 VITALS
BODY MASS INDEX: 17.34 KG/M2 | TEMPERATURE: 98.8 F | OXYGEN SATURATION: 98 % | HEIGHT: 65 IN | WEIGHT: 104.06 LBS | RESPIRATION RATE: 20 BRPM | DIASTOLIC BLOOD PRESSURE: 47 MMHG | HEART RATE: 55 BPM | SYSTOLIC BLOOD PRESSURE: 112 MMHG

## 2024-03-19 DIAGNOSIS — M81.0 AGE-RELATED OSTEOPOROSIS WITHOUT CURRENT PATHOLOGICAL FRACTURE: Primary | ICD-10-CM

## 2024-03-19 PROCEDURE — 96372 THER/PROPH/DIAG INJ SC/IM: CPT

## 2024-03-19 PROCEDURE — 25010000002 DENOSUMAB 60 MG/ML SOLUTION PREFILLED SYRINGE: Performed by: NURSE PRACTITIONER

## 2024-03-19 RX ADMIN — DENOSUMAB 60 MG: 60 INJECTION SUBCUTANEOUS at 14:08

## 2024-03-21 ENCOUNTER — OFFICE VISIT (OUTPATIENT)
Dept: FAMILY MEDICINE CLINIC | Facility: CLINIC | Age: 64
End: 2024-03-21
Payer: MEDICARE

## 2024-03-21 VITALS
DIASTOLIC BLOOD PRESSURE: 60 MMHG | HEIGHT: 65 IN | SYSTOLIC BLOOD PRESSURE: 92 MMHG | HEART RATE: 72 BPM | OXYGEN SATURATION: 97 % | WEIGHT: 103.8 LBS | TEMPERATURE: 97.4 F | BODY MASS INDEX: 17.29 KG/M2

## 2024-03-21 DIAGNOSIS — Z98.890 HISTORY OF SHOULDER SURGERY: ICD-10-CM

## 2024-03-21 DIAGNOSIS — G89.29 CHRONIC RIGHT SHOULDER PAIN: ICD-10-CM

## 2024-03-21 DIAGNOSIS — F17.210 CIGARETTE NICOTINE DEPENDENCE WITHOUT COMPLICATION: ICD-10-CM

## 2024-03-21 DIAGNOSIS — M54.50 CHRONIC MIDLINE LOW BACK PAIN WITHOUT SCIATICA: ICD-10-CM

## 2024-03-21 DIAGNOSIS — M25.521 RIGHT ELBOW PAIN: ICD-10-CM

## 2024-03-21 DIAGNOSIS — Z00.00 ENCOUNTER FOR SUBSEQUENT ANNUAL WELLNESS VISIT (AWV) IN MEDICARE PATIENT: Primary | ICD-10-CM

## 2024-03-21 DIAGNOSIS — M51.36 DDD (DEGENERATIVE DISC DISEASE), LUMBAR: ICD-10-CM

## 2024-03-21 DIAGNOSIS — Z98.890 HISTORY OF KYPHOPLASTY: ICD-10-CM

## 2024-03-21 DIAGNOSIS — Z87.891 PERSONAL HISTORY OF TOBACCO USE: ICD-10-CM

## 2024-03-21 DIAGNOSIS — G89.29 CHRONIC MIDLINE LOW BACK PAIN WITHOUT SCIATICA: ICD-10-CM

## 2024-03-21 DIAGNOSIS — M50.30 DDD (DEGENERATIVE DISC DISEASE), CERVICAL: ICD-10-CM

## 2024-03-21 DIAGNOSIS — M25.511 CHRONIC RIGHT SHOULDER PAIN: ICD-10-CM

## 2024-03-21 DIAGNOSIS — M54.2 NECK PAIN: ICD-10-CM

## 2024-03-21 RX ORDER — METOPROLOL SUCCINATE 25 MG/1
25 TABLET, EXTENDED RELEASE ORAL 2 TIMES DAILY
Qty: 180 TABLET | Refills: 1 | Status: SHIPPED | OUTPATIENT
Start: 2024-03-21

## 2024-03-21 RX ORDER — METOPROLOL SUCCINATE 50 MG/1
50 TABLET, EXTENDED RELEASE ORAL 2 TIMES DAILY
Qty: 180 TABLET | Refills: 1 | Status: SHIPPED | OUTPATIENT
Start: 2024-03-21

## 2024-03-21 NOTE — PROGRESS NOTES
The ABCs of the Annual Wellness Visit  Subsequent Medicare Wellness Visit    Subjective    Ida Vincent is a 63 y.o. female who presents for a Subsequent Medicare Wellness Visit.    The following portions of the patient's history were reviewed and   updated as appropriate: allergies, current medications, past family history, past medical history, past social history, past surgical history, and problem list.    Compared to one year ago, the patient feels her physical   health is the same.    Compared to one year ago, the patient feels her mental   health is the same.    Recent Hospitalizations:  She was not admitted to the hospital during the last year.       Current Medical Providers:  Patient Care Team:  Jennifer Ho APRN as PCP - General (Nurse Practitioner)  Phu Nicholson MD as Cardiologist (Cardiology)    Outpatient Medications Prior to Visit   Medication Sig Dispense Refill    albuterol sulfate  (90 Base) MCG/ACT inhaler Inhale 2 puffs Every 4 (Four) Hours As Needed.      aspirin (ASPIRIN LOW DOSE) 81 MG EC tablet Take 1 tablet by mouth Daily. 90 tablet 3    Calcium Citrate-Vitamin D3 (CITRACAL) 315-6.25 MG-MCG tablet tablet 1 tablet Daily.      cetirizine (zyrTEC) 10 MG tablet Take 1 tablet by mouth Daily. 90 tablet 3    coenzyme Q10 100 MG capsule Take 1 capsule by mouth Daily.      Denosumab (PROLIA SC) Inject  under the skin into the appropriate area as directed Every 6 (Six) Months.      gabapentin (NEURONTIN) 300 MG capsule Take 1 capsule by mouth 2 (Two) Times a Day. 180 capsule 1    levothyroxine (SYNTHROID, LEVOTHROID) 50 MCG tablet Take 1 tablet by mouth Every Morning. 90 tablet 1    ondansetron (ZOFRAN) 8 MG tablet Take 1 tablet by mouth Every 8 (Eight) Hours As Needed for Nausea or Vomiting. 30 tablet 5    pantoprazole (PROTONIX) 20 MG EC tablet Take 1 tablet by mouth Daily. 90 tablet 1    QUEtiapine Fumarate 150 MG tablet Take 100 mg by mouth Every Night.       rosuvastatin (CRESTOR) 40 MG tablet Take 1 tablet by mouth Every Night. 90 tablet 1    traZODone (DESYREL) 100 MG tablet Take 2 tablets by mouth Every Night.      Fluticasone-Umeclidin-Vilant (Trelegy Ellipta) 100-62.5-25 MCG/ACT inhaler Inhale 1 puff Every Night. 60 each 2    metoprolol succinate XL (TOPROL-XL) 25 MG 24 hr tablet Take 1 tablet by mouth 2 (Two) Times a Day. 180 tablet 1    metoprolol succinate XL (TOPROL-XL) 50 MG 24 hr tablet Take 1 tablet by mouth 2 (Two) Times a Day. 180 tablet 1    varenicline (Chantix Continuing Month Blu) 1 MG tablet Take 1 tablet by mouth 2 (Two) Times a Day. (Patient not taking: Reported on 3/21/2024) 60 tablet 3     No facility-administered medications prior to visit.       No opioid medication identified on active medication list. I have reviewed chart for other potential  high risk medication/s and harmful drug interactions in the elderly.        Aspirin is on active medication list. Aspirin use is indicated based on review of current medical condition/s. Pros and cons of this therapy have been discussed today. Benefits of this medication outweigh potential harm.  Patient has been encouraged to continue taking this medication.  .      Patient Active Problem List   Diagnosis    Chronic obstructive pulmonary disease    Hyperlipemia    Acquired hypothyroidism    Asthma    Age related osteoporosis    Cigarette nicotine dependence without complication    Gastroesophageal reflux disease without esophagitis    Age-related osteoporosis without current pathological fracture     Advance Care Planning   Advance Care Planning     Advance Directive is not on file.  ACP discussion was held with the patient during this visit. Patient does not have an advance directive, information provided.     Objective    Vitals:    03/21/24 1409   BP: 92/60   BP Location: Left arm   Patient Position: Sitting   Cuff Size: Small Adult   Pulse: 72   Temp: 97.4 °F (36.3 °C)   TempSrc: Infrared   SpO2:  "97%   Weight: 47.1 kg (103 lb 12.8 oz)   Height: 165.1 cm (65\")   PainSc:   4   PainLoc: Elbow  Comment: Right     Estimated body mass index is 17.27 kg/m² as calculated from the following:    Height as of this encounter: 165.1 cm (65\").    Weight as of this encounter: 47.1 kg (103 lb 12.8 oz).    BMI is below normal parameters (malnutrition). Recommendations: Information on healthy weight added to patient's after visit summary      Does the patient have evidence of cognitive impairment? No          HEALTH RISK ASSESSMENT    Smoking Status:  Social History     Tobacco Use   Smoking Status Every Day    Current packs/day: 0.00    Average packs/day: 0.5 packs/day for 45.0 years (22.5 ttl pk-yrs)    Types: Cigarettes    Start date: 1976    Last attempt to quit: 2021    Years since quittin.9   Smokeless Tobacco Never     Alcohol Consumption:  Social History     Substance and Sexual Activity   Alcohol Use Not Currently     Fall Risk Screen:    SHELLYADI Fall Risk Assessment was completed, and patient is at LOW risk for falls.Assessment completed on:3/21/2024    Depression Screening:      3/21/2024     2:09 PM   PHQ-2/PHQ-9 Depression Screening   Little Interest or Pleasure in Doing Things 0-->not at all   Feeling Down, Depressed or Hopeless 0-->not at all   PHQ-9: Brief Depression Severity Measure Score 0       Health Habits and Functional and Cognitive Screening:      3/21/2024     2:00 PM   Functional & Cognitive Status   Do you have difficulty preparing food and eating? No   Do you have difficulty bathing yourself, getting dressed or grooming yourself? No   Do you have difficulty using the toilet? No   Do you have difficulty moving around from place to place? No   Do you have trouble with steps or getting out of a bed or a chair? No   Current Diet Unhealthy Diet   Dental Exam Not up to date   Eye Exam Up to date   Exercise (times per week) 0 times per week   Current Exercises Include No Regular Exercise   Do " you need help using the phone?  No   Are you deaf or do you have serious difficulty hearing?  No   Do you need help to go to places out of walking distance? No   Do you need help shopping? No   Do you need help preparing meals?  No   Do you need help with housework?  No   Do you need help with laundry? No   Do you need help taking your medications? No   Do you need help managing money? No   Do you ever drive or ride in a car without wearing a seat belt? No   Have you felt unusual stress, anger or loneliness in the last month? No   Who do you live with? Spouse   If you need help, do you have trouble finding someone available to you? No   Have you been bothered in the last four weeks by sexual problems? No   Do you have difficulty concentrating, remembering or making decisions? No       Age-appropriate Screening Schedule:  Refer to the list below for future screening recommendations based on patient's age, sex and/or medical conditions. Orders for these recommended tests are listed in the plan section. The patient has been provided with a written plan.    Health Maintenance   Topic Date Due    LUNG CANCER SCREENING  01/30/2024    BMI FOLLOWUP  02/07/2024    INFLUENZA VACCINE  03/31/2024 (Originally 8/1/2023)    Pneumococcal Vaccine 0-64 (1 of 2 - PCV) 08/11/2024 (Originally 6/19/1966)    RSV Vaccine - Adults (1 - 1-dose 60+ series) 03/21/2025 (Originally 6/19/2022)    ZOSTER VACCINE (1 of 2) 03/21/2025 (Originally 6/19/2012)    TDAP/TD VACCINES (1 - Tdap) 06/19/2025 (Originally 6/19/1981)    COVID-19 Vaccine (3 - 2023-24 season) 12/12/2112 (Originally 9/1/2023)    LIPID PANEL  11/13/2024    DXA SCAN  01/30/2025    ANNUAL WELLNESS VISIT  03/21/2025    MAMMOGRAM  10/16/2025    COLORECTAL CANCER SCREENING  04/14/2031    HEPATITIS C SCREENING  Completed                  CMS Preventative Services Quick Reference  Risk Factors Identified During Encounter  None Identified  The above risks/problems have been discussed with  the patient.  Pertinent information has been shared with the patient in the After Visit Summary.  An After Visit Summary and PPPS were made available to the patient.    Follow Up:   Next Medicare Wellness visit to be scheduled in 1 year.       Additional E&M Note during same encounter follows:  Patient has multiple medical problems which are significant and separately identifiable that require additional work above and beyond the Medicare Wellness Visit.      Chief Complaint  Elbow Pain (Right elbow pain, pt reports sensitivity 3-4/10 pain), Med Refill, Medicare Wellness-subsequent, Med Managment (Pt would like to discuss stopping - gabapentin (NEURONTIN) 300 MG capsule, Rx is not working), and Pain (Pt reports chronic shoulder RT pain - limited ROM, previous 36+ yr injury, 10/10 pain )    Subjective        Back Pain  This is a chronic problem. The current episode started more than 1 month ago. The problem occurs constantly. The problem is unchanged. The pain is present in the sacro-iliac. The quality of the pain is described as shooting and stabbing. The pain is at a severity of 9/10. The pain is The same all the time. The symptoms are aggravated by bending and standing. Stiffness is present All day. Associated symptoms include weakness. Pertinent negatives include no fever. Risk factors include history of osteoporosis, history of steroid use, lack of exercise, menopause, poor posture and recent trauma.   Additional comments: Shoulder and neck pain  Shoulder Injury   The right shoulder is affected. Incident onset: chronic. Injury mechanism: Previous scrotal or surgery between the years of 2000 and 2010. The quality of the pain is described as shooting. The pain radiates to the left neck. The pain is at a severity of 10/10. The pain is severe. Associated symptoms include muscle weakness. The symptoms are aggravated by movement and overhead lifting. She has tried heat, NSAIDs and rest for the symptoms. The treatment  "provided no relief.   Neck Pain   This is a chronic problem. The current episode started more than 1 year ago. The problem has been gradually worsening. The pain is associated with nothing. The pain is present in the left side. The quality of the pain is described as shooting. The pain is at a severity of 10/10. The pain is severe. The symptoms are aggravated by position. Associated symptoms include weakness. Pertinent negatives include no fever. She has tried heat, NSAIDs and neck support for the symptoms. The treatment provided no relief.   Elbow Injury  This is a new problem. The current episode started more than 1 month ago. The problem occurs intermittently. The problem has been waxing and waning. Associated symptoms include arthralgias, neck pain and weakness. Pertinent negatives include no anorexia, change in bowel habit, fever or urinary symptoms. Exacerbated by: Palpation. She has tried rest and NSAIDs for the symptoms. The treatment provided no relief.   Right shoulder surgery A/C repair 2010, pain is getting worse, pain has returned and has been ongoing for may years extends up the neck,   Ida Vincent is also being seen today for     Review of Systems   Constitutional:  Negative for fever.   Gastrointestinal:  Negative for anorexia and change in bowel habit.   Musculoskeletal:  Positive for arthralgias, back pain and neck pain.   Neurological:  Positive for weakness.       Objective   Vital Signs:  BP 92/60 (BP Location: Left arm, Patient Position: Sitting, Cuff Size: Small Adult)   Pulse 72   Temp 97.4 °F (36.3 °C) (Infrared)   Ht 165.1 cm (65\")   Wt 47.1 kg (103 lb 12.8 oz)   SpO2 97%   BMI 17.27 kg/m²     Physical Exam  Vitals reviewed.   Constitutional:       Appearance: Normal appearance. She is well-developed.   HENT:      Head: Normocephalic and atraumatic.   Eyes:      Conjunctiva/sclera: Conjunctivae normal.      Pupils: Pupils are equal, round, and reactive to light.   Cardiovascular: "      Rate and Rhythm: Normal rate and regular rhythm.      Heart sounds: No murmur heard.  Pulmonary:      Effort: Pulmonary effort is normal.      Breath sounds: Normal breath sounds. No wheezing or rhonchi.   Musculoskeletal:      Right shoulder: Tenderness present.      Cervical back: Pain with movement and muscular tenderness present. Decreased range of motion.      Lumbar back: Tenderness present.      Right lower leg: No edema.      Left lower leg: No edema.   Skin:     General: Skin is warm and dry.   Neurological:      Mental Status: She is alert and oriented to person, place, and time.   Psychiatric:         Mood and Affect: Mood and affect normal.         Behavior: Behavior normal.         Thought Content: Thought content normal.         Judgment: Judgment normal.          The following data was reviewed by: BRYANT Ruelas on 03/21/2024:  Common labs          9/19/2023    14:17 11/13/2023    15:04 3/13/2024    14:21   Common Labs   Glucose 92  75  83    BUN 11  9  7    Creatinine 0.58  0.67  0.69    Sodium 137  138  138    Potassium 4.6  4.6  4.4    Chloride 101  100  98    Calcium 9.2  9.4  9.1    Albumin 4.5  4.9  4.7    Total Bilirubin 0.2  0.3  0.2    Alkaline Phosphatase 47  40  43    AST (SGOT) 17  20  16    ALT (SGPT) 14  15  9    WBC  6.82     Hemoglobin  14.0     Hematocrit  41.9     Platelets  190     Total Cholesterol  203     Triglycerides  138     HDL Cholesterol  66     LDL Cholesterol   113       Data reviewed : previous office note           Assessment and Plan   Diagnoses and all orders for this visit:    1. Encounter for subsequent annual wellness visit (AWV) in Medicare patient (Primary)    2. BMI less than 19,adult    3. Right elbow pain  -     XR Elbow 2 View Right; Future    4. DDD (degenerative disc disease), cervical  -     MRI Cervical Spine Without Contrast; Future    5. Neck pain  -     MRI Cervical Spine Without Contrast; Future    6. Chronic right shoulder pain  -      MRI Shoulder Right Without Contrast; Future    7. History of shoulder surgery  -     MRI Shoulder Right Without Contrast; Future    8. Cigarette nicotine dependence without complication  -      CT Chest Low Dose Cancer Screening WO; Future    9. Personal history of tobacco use  -      CT Chest Low Dose Cancer Screening WO; Future    10. DDD (degenerative disc disease), lumbar  -     Ambulatory Referral to Neurosurgery    11. Chronic midline low back pain without sciatica  -     Ambulatory Referral to Neurosurgery    12. History of kyphoplasty  -     Ambulatory Referral to Neurosurgery    Other orders  -     metoprolol succinate XL (TOPROL-XL) 25 MG 24 hr tablet; Take 1 tablet by mouth 2 (Two) Times a Day.  Dispense: 180 tablet; Refill: 1  -     metoprolol succinate XL (TOPROL-XL) 50 MG 24 hr tablet; Take 1 tablet by mouth 2 (Two) Times a Day.  Dispense: 180 tablet; Refill: 1  -     Fluticasone-Umeclidin-Vilant (Trelegy Ellipta) 100-62.5-25 MCG/ACT inhaler; Inhale 1 puff Every Night.  Dispense: 60 each; Refill: 2      Will refill medication, will get a referral over to neurosurgery for low back pain, do CT low-dose screening due to cigarette smoking, MRI C-spine due to chronic pain and degenerative disc disease and MRI of right shoulder due to chronic pain status post surgery of right shoulder.  Discussed with patient to expect a phone call with the imaging, and will also send over x-ray for her elbow pain.  Discussed return precautions.  Patient verbalized understanding.       Follow Up   No follow-ups on file.  Patient was given instructions and counseling regarding her condition or for health maintenance advice. Please see specific information pulled into the AVS if appropriate.

## 2024-04-04 ENCOUNTER — HOSPITAL ENCOUNTER (OUTPATIENT)
Dept: CT IMAGING | Facility: HOSPITAL | Age: 64
Discharge: HOME | End: 2024-04-04
Payer: MEDICARE

## 2024-04-04 ENCOUNTER — HOSPITAL ENCOUNTER (OUTPATIENT)
Dept: GENERAL RADIOLOGY | Facility: HOSPITAL | Age: 64
Discharge: HOME | End: 2024-04-04
Payer: MEDICARE

## 2024-04-04 DIAGNOSIS — F17.210 CIGARETTE NICOTINE DEPENDENCE WITHOUT COMPLICATION: ICD-10-CM

## 2024-04-04 DIAGNOSIS — Z87.891 PERSONAL HISTORY OF TOBACCO USE: ICD-10-CM

## 2024-04-04 DIAGNOSIS — M25.521 RIGHT ELBOW PAIN: ICD-10-CM

## 2024-04-04 PROCEDURE — 71271 CT THORAX LUNG CANCER SCR C-: CPT

## 2024-04-04 PROCEDURE — 73070 X-RAY EXAM OF ELBOW: CPT

## 2024-04-08 DIAGNOSIS — G62.9 NEUROPATHY: ICD-10-CM

## 2024-04-08 DIAGNOSIS — M79.7 FIBROMYALGIA: ICD-10-CM

## 2024-04-10 RX ORDER — GABAPENTIN 300 MG/1
300 CAPSULE ORAL 2 TIMES DAILY
Qty: 180 CAPSULE | Refills: 1 | Status: SHIPPED | OUTPATIENT
Start: 2024-04-10

## 2024-04-23 RX ORDER — ROSUVASTATIN CALCIUM 40 MG/1
40 TABLET, COATED ORAL NIGHTLY
Qty: 90 TABLET | Refills: 1 | Status: SHIPPED | OUTPATIENT
Start: 2024-04-23

## 2024-04-23 NOTE — TELEPHONE ENCOUNTER
Caller: Ida Vincent    Relationship: Self    Best call back number: [phone]     Requested Prescriptions:   Requested Prescriptions     Pending Prescriptions Disp Refills    rosuvastatin (CRESTOR) 40 MG tablet 90 tablet 1     Sig: Take 1 tablet by mouth Every Night.        Pharmacy where request should be sent: [address] [phone] [place] [phone]      Last office visit with prescribing clinician: 3/21/2024   Last telemedicine visit with prescribing clinician: Visit date not found   Next office visit with prescribing clinician: 5/30/2024     Additional details provided by patient: PATIENT STATES SHE NEEDS REFILL SENT IN, WILL NOT HAVE ENOUGH TO MAKE IT UNTIL APPOINTMENT ON 5.30.2024    Does the patient have less than a 3 day supply:  [] Yes  [x] No    Would you like a call back once the refill request has been completed: [] Yes [] No    If the office needs to give you a call back, can they leave a voicemail: [x] Yes [] No    Earlene Nelson Rep   04/23/24 14:25 EDT

## 2024-04-29 ENCOUNTER — HOSPITAL ENCOUNTER (OUTPATIENT)
Dept: MRI IMAGING | Facility: HOSPITAL | Age: 64
Discharge: HOME | End: 2024-04-29
Payer: MEDICARE

## 2024-04-29 DIAGNOSIS — M54.2 NECK PAIN: ICD-10-CM

## 2024-04-29 DIAGNOSIS — G89.29 CHRONIC RIGHT SHOULDER PAIN: ICD-10-CM

## 2024-04-29 DIAGNOSIS — M50.30 DDD (DEGENERATIVE DISC DISEASE), CERVICAL: ICD-10-CM

## 2024-04-29 DIAGNOSIS — M25.511 CHRONIC RIGHT SHOULDER PAIN: ICD-10-CM

## 2024-04-29 DIAGNOSIS — Z98.890 HISTORY OF SHOULDER SURGERY: ICD-10-CM

## 2024-04-29 PROCEDURE — 72141 MRI NECK SPINE W/O DYE: CPT

## 2024-04-29 PROCEDURE — 73221 MRI JOINT UPR EXTREM W/O DYE: CPT

## 2024-05-02 DIAGNOSIS — M25.511 ACUTE PAIN OF RIGHT SHOULDER: Primary | ICD-10-CM

## 2024-05-02 DIAGNOSIS — M75.81 TENDINITIS OF RIGHT INFRASPINATUS TENDON: ICD-10-CM

## 2024-05-02 DIAGNOSIS — S43.431A TEAR OF RIGHT GLENOID LABRUM, INITIAL ENCOUNTER: ICD-10-CM

## 2024-05-02 DIAGNOSIS — M75.91 RIGHT SUPRASPINATUS TENDINITIS: ICD-10-CM

## 2024-05-16 ENCOUNTER — OFFICE VISIT (OUTPATIENT)
Dept: NEUROSURGERY | Facility: CLINIC | Age: 64
End: 2024-05-16
Payer: MEDICARE

## 2024-05-16 VITALS
BODY MASS INDEX: 16.76 KG/M2 | SYSTOLIC BLOOD PRESSURE: 113 MMHG | HEIGHT: 65 IN | HEART RATE: 61 BPM | DIASTOLIC BLOOD PRESSURE: 60 MMHG | WEIGHT: 100.6 LBS

## 2024-05-16 DIAGNOSIS — M50.30 DEGENERATIVE DISC DISEASE, CERVICAL: ICD-10-CM

## 2024-05-16 DIAGNOSIS — M54.2 CERVICALGIA: Primary | ICD-10-CM

## 2024-05-16 PROBLEM — J30.9 CHRONIC ALLERGIC RHINITIS: Status: ACTIVE | Noted: 2024-05-16

## 2024-05-16 PROBLEM — J01.00 ACUTE NON-RECURRENT MAXILLARY SINUSITIS: Status: ACTIVE | Noted: 2024-05-16

## 2024-05-16 RX ORDER — MAGNESIUM OXIDE 400 MG/1
400 TABLET ORAL DAILY
COMMUNITY

## 2024-05-16 NOTE — PROGRESS NOTES
"Chief Complaint  Neck Pain (Neck stiffness, shoulder pain.)    Subjective          Ida Vincent who is a 63 y.o. year old female who presents to [facility] for evaluation of cervical spine.     The patient complains of pain located in the cervical spine.  Patients states the pain has been present for 1 year.  The pain came on gradually. Pt woke up with stiffness in her neck. Pain never improved.  The pain scale level is 9.  The pain does radiate. Dermatomes are located on right Cervical at: a non-specific dermatome..  The pain is waxing/waning and described as aching.  The pain is worse in the morning and at night. Patient states lifting, head turning, and raising the arm makes the pain worse.  Patient states rest makes the pain better.    Associated Symptoms Include: Weakness and Denies numbness and tingling. She is unable to raise the arm at the shoulder.     Conservative Interventions Include:  She has not had any physical therapy for her neck. She will take Motrin and Tylenol. She is prescribed Gabapentin for fibromyalgia.    Was this the result of an injury or accident? : No    History of Previous Spinal Surgery?: Yes.  Lumbar, Date Vertebroplasty L2     Nicotine use: smoker  (0.5 ppd)    BMI: Body mass index is 16.74 kg/m².      Review of Systems   Musculoskeletal:  Positive for arthralgias, back pain, myalgias, neck pain and neck stiffness.   All other systems reviewed and are negative.       Objective   Vital Signs:   /60 (BP Location: Left arm)   Pulse 61   Ht 165.1 cm (65\")   Wt 45.6 kg (100 lb 9.6 oz)   BMI 16.74 kg/m²       Physical Exam  Vitals reviewed.   Constitutional:       Appearance: Normal appearance.   Musculoskeletal:      Right shoulder: Tenderness present. Decreased range of motion. Decreased strength.      Left shoulder: No tenderness. Normal range of motion.      Cervical back: Tenderness present. Pain with movement present. Decreased " range of motion.   Neurological:      Mental Status: She is alert and oriented to person, place, and time.      Motor: Motor strength is normal.     Gait: Gait is intact.      Deep Tendon Reflexes:      Reflex Scores:       Tricep reflexes are 2+ on the right side and 2+ on the left side.       Bicep reflexes are 2+ on the right side and 2+ on the left side.       Brachioradialis reflexes are 2+ on the right side and 2+ on the left side.       Neurologic Exam     Mental Status   Oriented to person, place, and time.   Level of consciousness: alert    Motor Exam   Muscle bulk: normal  Overall muscle tone: normal    Strength   Strength 5/5 throughout.     Sensory Exam   Light touch normal.     Gait, Coordination, and Reflexes     Gait  Gait: normal    Reflexes   Right brachioradialis: 2+  Left brachioradialis: 2+  Right biceps: 2+  Left biceps: 2+  Right triceps: 2+  Left triceps: 2+  Right Pastor: absent  Left Pastor: absent       Result Review :       Data reviewed : Radiologic studies MRI Cervical Spine on 4/29/24 at [facility] personally reviewed and interpreted. DDD, most significant at C5/6 and C6/7 without significant spinal canal or foraminal stenosis.         MRI Right Shoulder 4/29/24  Impression:  Superior glenoid labral tearing extending posterior to anterior from  approximately 10:00 to 2:00. No evidence of paralabral cyst formation or  associated full-thickness articular cartilage loss.     Moderate-severe distal supraspinatus and infraspinatus  tendinopathy/bursal sided fraying without evidence of rotator cuff tear.     Mild AC joint arthritis.     Assessment and Plan    Diagnoses and all orders for this visit:    1. Cervicalgia (Primary)    2. Degenerative disc disease, cervical    Pt presenting for evaluation of neck pain. We reviewed her MRI Cervical Spine, demonstrating multilevel disc degeneration without significant spinal canal or foraminal stenosis. No surgical recommendations at this time. She has  weakness in the right arm and difficulty raising the arm. She also had imaging of her shoulder demonstrating a labral tear and tendinopathy. She is scheduled to see Orthopedic.     She could consider physical therapy for her neck. Will wait to see what the recommendations from Ortho.     Follow up as needed.       Follow Up   Return if symptoms worsen or fail to improve.  Patient was given instructions and counseling regarding her condition or for health maintenance advice.     -Keep appointment for Ortho  -Follow up as needed

## 2024-05-28 ENCOUNTER — OFFICE VISIT (OUTPATIENT)
Dept: ORTHOPEDIC SURGERY | Facility: CLINIC | Age: 64
End: 2024-05-28
Payer: MEDICARE

## 2024-05-28 VITALS
BODY MASS INDEX: 16.33 KG/M2 | HEIGHT: 65 IN | DIASTOLIC BLOOD PRESSURE: 68 MMHG | HEART RATE: 65 BPM | WEIGHT: 98 LBS | OXYGEN SATURATION: 94 % | SYSTOLIC BLOOD PRESSURE: 113 MMHG

## 2024-05-28 DIAGNOSIS — M25.511 RIGHT SHOULDER PAIN, UNSPECIFIED CHRONICITY: Primary | ICD-10-CM

## 2024-05-28 DIAGNOSIS — S43.431A TEAR OF RIGHT GLENOID LABRUM, INITIAL ENCOUNTER: ICD-10-CM

## 2024-05-28 DIAGNOSIS — M75.81 ROTATOR CUFF TENDONITIS, RIGHT: ICD-10-CM

## 2024-05-28 PROCEDURE — 20610 DRAIN/INJ JOINT/BURSA W/O US: CPT | Performed by: ORTHOPAEDIC SURGERY

## 2024-05-28 PROCEDURE — 99203 OFFICE O/P NEW LOW 30 MIN: CPT | Performed by: ORTHOPAEDIC SURGERY

## 2024-05-28 RX ORDER — TRIAMCINOLONE ACETONIDE 40 MG/ML
40 INJECTION, SUSPENSION INTRA-ARTICULAR; INTRAMUSCULAR
Status: COMPLETED | OUTPATIENT
Start: 2024-05-28 | End: 2024-05-28

## 2024-05-28 RX ORDER — LIDOCAINE HYDROCHLORIDE 10 MG/ML
5 INJECTION, SOLUTION INFILTRATION; PERINEURAL
Status: COMPLETED | OUTPATIENT
Start: 2024-05-28 | End: 2024-05-28

## 2024-05-28 RX ADMIN — TRIAMCINOLONE ACETONIDE 40 MG: 40 INJECTION, SUSPENSION INTRA-ARTICULAR; INTRAMUSCULAR at 15:09

## 2024-05-28 RX ADMIN — LIDOCAINE HYDROCHLORIDE 5 ML: 10 INJECTION, SOLUTION INFILTRATION; PERINEURAL at 15:09

## 2024-05-28 NOTE — PROGRESS NOTES
"Chief Complaint  Initial Evaluation of the Right Shoulder     Subjective      Ida Vincent presents to [facility] for initial evaluation of the right shoulder.  She had a MRI on 4/29/24 and is here to review.  She has had pain for years.  She has had injections in the past.  She has had pain since 1989.  She has had an arthroscopy in the past.   She has pain every day and is tired of being in pain.  She has decrease ROM forward and upward ROM of the shoulder.  She had a MRI and is here to review.  She has some pain and \"electricity\" down her arm.  Sometimes her arm \"catches.\"    Allergies   Allergen Reactions    Ciprofloxacin Anaphylaxis and Other (See Comments)    Thimerosal (Thiomersal) Swelling    Benadryl [Diphenhydramine] Other (See Comments)     \"It makes me climb the walls and shake\"    Prednisone Other (See Comments)     CHF  Pt stated solu-medrol is okay to take    Erythromycin Nausea And Vomiting        Social History     Socioeconomic History    Marital status:    Tobacco Use    Smoking status: Every Day     Current packs/day: 0.50     Average packs/day: 0.5 packs/day for 45.4 years (22.7 ttl pk-yrs)     Types: Cigarettes     Start date: 4/1/1976     Last attempt to quit: 4/1/2021    Smokeless tobacco: Never   Vaping Use    Vaping status: Some Days   Substance and Sexual Activity    Alcohol use: Not Currently    Drug use: Not Currently     Frequency: 7.0 times per week     Types: Marijuana     Comment: Patient states she uses marijuana every night-01/06/2022    Sexual activity: Not Currently     Partners: Male     Birth control/protection: Vasectomy, Hysterectomy        I reviewed the patient's chief complaint, history of present illness, review of systems, past medical history, surgical history, family history, social history, medications, and allergy list.     Review of Systems     Constitutional: Denies fevers, chills, weight loss  Cardiovascular: Denies chest " "pain, shortness of breath  Skin: Denies rashes, acute skin changes  Neurologic: Denies headache, loss of consciousness        Vital Signs:   /68   Pulse 65   Ht 165.1 cm (65\")   Wt 44.5 kg (98 lb)   SpO2 94%   BMI 16.31 kg/m²          Physical Exam  General: Alert. No acute distress    Ortho Exam      RIGHT SHOULDER Forward flexion 160 with pain. Abduction 90. External rotation 60. Internal rotation L5. Positive Cross body adduction. Supraspinatus strength 4+/5. Infraspinatus Strength 5/5. Infrared subscap 5/5. Positive Bueno. Positive Neer. Negative Apprehension. Negative Lift off. (Negative Obriens. Sensation intact to light touch, median, radial, ulnar nerve. Positive AIN, PIN, ulnar nerve motor. Positive pulses. Positive Impingement signs. Good strength in triceps, biceps, deltoid, wrist extensors and wrist flexors. Tender to palpation to the anterior aspect of the shoulder and down the arm.        Large Joint Arthrocentesis  Date/Time: 5/28/2024 3:09 PM  Consent given by: patient  Site marked: site marked  Timeout: Immediately prior to procedure a time out was called to verify the correct patient, procedure, equipment, support staff and site/side marked as required   Supporting Documentation  Indications: pain   Procedure Details  Location: shoulder (RIGHT) -   Needle gauge: 21 G.  Medications administered: 5 mL lidocaine 1 %; 40 mg triamcinolone acetonide 40 MG/ML  Patient tolerance: patient tolerated the procedure well with no immediate complications      This injection documentation was Scribed for Marilyn Busch MD by Eileen Cifuentes.  05/28/24   15:10 EDT    Imaging Results (Most Recent)       None             Result Review :         MRI Shoulder Right Without Contrast    Result Date: 5/1/2024  Narrative: MRI SHOULDER RIGHT WO CONTRAST-  Date of Exam: 4/29/2024 3:02 PM  Indication: Shoulder pain, chronic, osteoarthritis suspected; M25.511-Pain in right shoulder; G89.29-Other chronic pain; " Z98.890-Other specified postprocedural states.  Comparison: None available.  Technique:  Routine multiplanar/multisequence images of the right shoulder were obtained without contrast administration.    Findings: Moderate-severe distal supraspinatus and infraspinatus tendinopathy and bursal sided fraying. No evidence of discrete tear. The subscapularis and teres minor appear intact. No significant rotator cuff fatty muscle atrophy.  The long head biceps tendon is intact.  There is increased irregular signal within the superior glenoid labrum extending posterior to anterior from approximately 10:00 to 2:00. No evidence of paralabral cyst formation. No evidence of full-thickness glenohumeral articular cartilage loss. No significant joint effusion.  There is mild acromioclavicular joint arthritis. Mild irregular/diminutive morphology of the acromion without evidence of subacromial spur formation or os acromiale.  No evidence of fracture or suspicious osseous lesion. Cystic changes of the humeral head bare area. Remaining visualized soft tissues are unremarkable.      Impression: Impression: Superior glenoid labral tearing extending posterior to anterior from approximately 10:00 to 2:00. No evidence of paralabral cyst formation or associated full-thickness articular cartilage loss.  Moderate-severe distal supraspinatus and infraspinatus tendinopathy/bursal sided fraying without evidence of rotator cuff tear.  Mild AC joint arthritis.    Electronically Signed By-Biju Garrett MD On:5/1/2024 10:21 AM      MRI Cervical Spine Without Contrast    Result Date: 5/1/2024  Narrative:  MRI CERVICAL SPINE WO CONTRAST-  Date of Exam: 4/29/2024 3:02 PM  Indication: Neck pain, chronic, degenerative changes on xray; M50.30-Other cervical disc degeneration, unspecified cervical region; M54.2-Cervicalgia.  Comparison: None available.  Technique:  Routine multiplanar/multisequence sequence images of the cervical spine were obtained without  contrast administration.    Findings: C2-C3: No disc protrusion. No central canal or foraminal stenosis  C3-C4: Mild broad disc protrusion which does not contact the cord. No significant foraminal stenosis  C4-C5: Mild broad disc protrusion which approaches but does not compress the cord. No significant foraminal stenosis  C5-C6: Broad-based disc/osteophyte complex which does not compress the cord. Slight C5 retrolisthesis due to loss of disc space height. No significant foraminal stenosis  C6-C7: Broad disc protrusion asymmetric to the right which does not compress the cord. Right foraminal stenosis due to uncovertebral hypertrophy  C7-T1: No disc protrusion. No central canal or foraminal stenosis  No signal abnormality of the cord. No abnormality of the visualized posterior fossa structures. Normal marrow signal.      Impression: Impression: Cervical degenerative disease    Electronically Signed By-Mike Lamb On:5/1/2024 7:44 AM              Assessment and Plan     Diagnoses and all orders for this visit:    1. Right shoulder pain, unspecified chronicity (Primary)    2. Tear of right glenoid labrum, initial encounter    3. Rotator cuff tendonitis, right        Discussed the treatment plan with the patient. I reviewed the MRI results with the patient.     Discussed the treatment options with the patient, operative vs non-operative.     Discussed the risks and benefits of conservative measures. The patient expressed understanding and wished to proceed with a right shoulder steroid injection.  She tolerated the injection well.     Prescribed physical therapy.     Educated on risk of smoking/nicotine. Discussed options for smoking cessation. and Call or return if worsening symptoms.    Follow Up     4-6 weeks assess if injection and therapy is helpful.        Patient was given instructions and counseling regarding her condition or for health maintenance advice. Please see specific information pulled into the AVS  if appropriate.     Scribed for Marilyn Busch MD by Tika Hurd MA.  05/28/24   14:45 EDT    I have personally performed the services described in this document as scribed by the above individual and it is both accurate and complete. Marilyn Busch MD 05/28/24

## 2024-05-30 ENCOUNTER — OFFICE VISIT (OUTPATIENT)
Dept: FAMILY MEDICINE CLINIC | Facility: CLINIC | Age: 64
End: 2024-05-30
Payer: MEDICARE

## 2024-05-30 VITALS
HEIGHT: 65 IN | SYSTOLIC BLOOD PRESSURE: 116 MMHG | HEART RATE: 65 BPM | OXYGEN SATURATION: 96 % | WEIGHT: 97.9 LBS | TEMPERATURE: 97.6 F | DIASTOLIC BLOOD PRESSURE: 60 MMHG | BODY MASS INDEX: 16.31 KG/M2

## 2024-05-30 DIAGNOSIS — E78.5 HYPERLIPIDEMIA, UNSPECIFIED HYPERLIPIDEMIA TYPE: ICD-10-CM

## 2024-05-30 DIAGNOSIS — Z09 FOLLOW-UP EXAM: Primary | ICD-10-CM

## 2024-05-30 DIAGNOSIS — M81.0 AGE-RELATED OSTEOPOROSIS WITHOUT CURRENT PATHOLOGICAL FRACTURE: ICD-10-CM

## 2024-05-30 DIAGNOSIS — E03.9 ACQUIRED HYPOTHYROIDISM: ICD-10-CM

## 2024-05-30 DIAGNOSIS — E55.9 VITAMIN D DEFICIENCY: ICD-10-CM

## 2024-05-30 DIAGNOSIS — K21.9 GASTROESOPHAGEAL REFLUX DISEASE, UNSPECIFIED WHETHER ESOPHAGITIS PRESENT: ICD-10-CM

## 2024-05-30 PROCEDURE — 1160F RVW MEDS BY RX/DR IN RCRD: CPT | Performed by: NURSE PRACTITIONER

## 2024-05-30 PROCEDURE — 1126F AMNT PAIN NOTED NONE PRSNT: CPT | Performed by: NURSE PRACTITIONER

## 2024-05-30 PROCEDURE — 1159F MED LIST DOCD IN RCRD: CPT | Performed by: NURSE PRACTITIONER

## 2024-05-30 PROCEDURE — 99214 OFFICE O/P EST MOD 30 MIN: CPT | Performed by: NURSE PRACTITIONER

## 2024-05-30 RX ORDER — PANTOPRAZOLE SODIUM 20 MG/1
20 TABLET, DELAYED RELEASE ORAL DAILY
Qty: 90 TABLET | Refills: 1 | Status: SHIPPED | OUTPATIENT
Start: 2024-05-30

## 2024-05-30 RX ORDER — LEVOTHYROXINE SODIUM 0.05 MG/1
50 TABLET ORAL
Qty: 90 TABLET | Refills: 1 | Status: SHIPPED | OUTPATIENT
Start: 2024-05-30

## 2024-05-30 RX ORDER — CETIRIZINE HYDROCHLORIDE 10 MG/1
10 TABLET ORAL DAILY
Qty: 90 TABLET | Refills: 3 | Status: SHIPPED | OUTPATIENT
Start: 2024-05-30

## 2024-05-30 NOTE — PROGRESS NOTES
Chief Complaint  Med Refill    Subjective        Medical History: has a past medical history of Abnormal ECG, Allergic, Anxiety (1999), Arthritis, Asthma, Cholelithiasis (1999), Congenital heart disease, COPD (chronic obstructive pulmonary disease), Coronary artery disease, DDD (degenerative disc disease), lumbar, Depression (1999), Diverticulosis (2021), Fibromyalgia, Fibromyalgia, primary, GERD (gastroesophageal reflux disease), Heart block, History of diverticulosis, History of palpitations, Hyperlipemia, Hypothyroidism, Low back pain, Lumbago, Osteopenia (2021), Pneumonia (1976), Premature ventricular contractions (PVCs) (VPCs), Seasonal allergies, Slow to wake up after anesthesia, Substance abuse, and Thyroid disorder.     Surgical History: has a past surgical history that includes Vertebroplasty (06/04/2021); Tonsillectomy; Hysterectomy; shoulder arthroscopic acromioclavicular (ac) joint reconstruction (Right); Cardiac catheterization; Breast Augmentation; Osteotomy; ASD repair; Dental surgery; Colonoscopy (2011); Esophagogastroduodenoscopy (2011); Gallbladder surgery; Other surgical history; Other surgical history; Abdominoplasty; Mastopexy (Bilateral, 10/18/2021); Breast Implant Revision (Bilateral, 10/18/2021); Breast surgery (10/18/21); Cholecystectomy (1999); Cosmetic surgery; Septoplasty (2010); Spine surgery (2021); Total abdominal hysterectomy w/ bilateral salpingoophorectomy; and Sinus surgery.     Family History: family history includes Alcohol abuse in her mother; Anxiety disorder in her mother; Arthritis in her brother, father, mother, and sister; Cancer in her father; Diabetes in her brother and mother; Early death in her brother and father; Heart attack in her mother; Heart disease in her daughter and mother; Hyperlipidemia in her mother and sister; Hypertension in her mother; Liver disease in her brother; Mental illness in her sister; Other in her son; Thyroid disease in her mother; Vision loss  "in her mother.     Social History: reports that she has been smoking cigarettes. She started smoking about 48 years ago. She has a 22.7 pack-year smoking history. She has never used smokeless tobacco. She reports that she does not currently use alcohol. She reports that she does not currently use drugs after having used the following drugs: Marijuana. Frequency: 7.00 times per week.    Ida Vincent presents to [facility]  Hypothyroidism  This is a chronic problem. The current episode started more than 1 year ago. The problem occurs constantly. The problem has been unchanged. Pertinent negatives include no arthralgias, congestion, coughing, joint swelling, neck pain, swollen glands or vertigo. Nothing aggravates the symptoms. Treatments tried: Levothyroxine. The treatment provided significant relief.   Heartburn  She complains of heartburn. She reports no coughing. This is a chronic problem. The current episode started more than 1 year ago. The symptoms are aggravated by certain foods. Pertinent negatives include no melena. She has tried a PPI for the symptoms. The treatment provided significant relief.       Objective   Vital Signs:   /60 (BP Location: Right arm, Patient Position: Sitting, Cuff Size: Small Adult)   Pulse 65   Temp 97.6 °F (36.4 °C) (Infrared)   Ht 165.1 cm (65\")   Wt 44.4 kg (97 lb 14.4 oz)   SpO2 96%   BMI 16.29 kg/m²       Wt Readings from Last 3 Encounters:   05/30/24 44.4 kg (97 lb 14.4 oz)   05/28/24 44.5 kg (98 lb)   05/16/24 45.3 kg (99 lb 14.4 oz)        BP Readings from Last 3 Encounters:   05/30/24 116/60   05/28/24 113/68   05/16/24 99/57                 Physical Exam  Vitals reviewed.   Constitutional:       General: She is not in acute distress.     Appearance: Normal appearance. She is well-developed. She is not ill-appearing.   HENT:      Head: Normocephalic and atraumatic.   Eyes:      Conjunctiva/sclera: Conjunctivae normal.      " Pupils: Pupils are equal, round, and reactive to light.   Cardiovascular:      Rate and Rhythm: Normal rate and regular rhythm.      Heart sounds: No murmur heard.  Pulmonary:      Effort: Pulmonary effort is normal.      Breath sounds: Normal breath sounds. No wheezing.   Skin:     General: Skin is warm and dry.   Neurological:      Mental Status: She is alert and oriented to person, place, and time.   Psychiatric:         Mood and Affect: Mood and affect normal.         Behavior: Behavior normal.         Thought Content: Thought content normal.         Judgment: Judgment normal.        Result Review :  {The following data was reviewed by BRYANT Ruelas on 05/30/2024.  Common labs          9/19/2023    14:17 11/13/2023    15:04 3/13/2024    14:21   Common Labs   Glucose 92  75  83    BUN 11  9  7    Creatinine 0.58  0.67  0.69    Sodium 137  138  138    Potassium 4.6  4.6  4.4    Chloride 101  100  98    Calcium 9.2  9.4  9.1    Albumin 4.5  4.9  4.7    Total Bilirubin 0.2  0.3  0.2    Alkaline Phosphatase 47  40  43    AST (SGOT) 17  20  16    ALT (SGPT) 14  15  9    WBC  6.82     Hemoglobin  14.0     Hematocrit  41.9     Platelets  190     Total Cholesterol  203     Triglycerides  138     HDL Cholesterol  66     LDL Cholesterol   113       Data reviewed : Previous office note             Current Outpatient Medications on File Prior to Visit   Medication Sig Dispense Refill    albuterol sulfate  (90 Base) MCG/ACT inhaler Inhale 2 puffs Every 4 (Four) Hours As Needed.      aspirin (ASPIRIN LOW DOSE) 81 MG EC tablet Take 1 tablet by mouth Daily. 90 tablet 3    Calcium Citrate-Vitamin D3 (CITRACAL) 315-6.25 MG-MCG tablet tablet 1 tablet Daily.      coenzyme Q10 100 MG capsule Take 1 capsule by mouth Daily.      Denosumab (PROLIA SC) Inject  under the skin into the appropriate area as directed Every 6 (Six) Months.      Fluticasone-Umeclidin-Vilant (Trelegy Ellipta) 100-62.5-25 MCG/ACT inhaler  Inhale 1 puff Every Night. 60 each 2    gabapentin (NEURONTIN) 300 MG capsule Take 1 capsule by mouth 2 (Two) Times a Day. 180 capsule 1    magnesium oxide (MAG-OX) 400 MG tablet Take 1 tablet by mouth Daily.      metoprolol succinate XL (TOPROL-XL) 25 MG 24 hr tablet Take 1 tablet by mouth 2 (Two) Times a Day. 180 tablet 1    metoprolol succinate XL (TOPROL-XL) 50 MG 24 hr tablet Take 1 tablet by mouth 2 (Two) Times a Day. 180 tablet 1    NON FORMULARY TUMERIC      ondansetron (ZOFRAN) 8 MG tablet Take 1 tablet by mouth Every 8 (Eight) Hours As Needed for Nausea or Vomiting. 30 tablet 5    QUEtiapine Fumarate 150 MG tablet Take 100 mg by mouth Every Night.      rosuvastatin (CRESTOR) 40 MG tablet Take 1 tablet by mouth Every Night. 90 tablet 1    traZODone (DESYREL) 100 MG tablet Take 2 tablets by mouth Every Night.       No current facility-administered medications on file prior to visit.        Assessment and Plan  Diagnoses and all orders for this visit:    1. Follow-up exam (Primary)    2. Acquired hypothyroidism  -     Thyroid Panel With TSH; Future    3. Gastroesophageal reflux disease, unspecified whether esophagitis present    4. Age-related osteoporosis without current pathological fracture  -     Vitamin D,25-Hydroxy; Future  -     Magnesium; Future  -     Phosphorus; Future  -     DEXA Bone Density Axial; Future    5. Hyperlipidemia, unspecified hyperlipidemia type  -     CBC & Differential; Future  -     Lipid Panel; Future  -     Comprehensive Metabolic Panel; Future    6. Vitamin D deficiency  -     Vitamin D,25-Hydroxy; Future    Other orders  -     pantoprazole (PROTONIX) 20 MG EC tablet; Take 1 tablet by mouth Daily.  Dispense: 90 tablet; Refill: 1  -     levothyroxine (SYNTHROID, LEVOTHROID) 50 MCG tablet; Take 1 tablet by mouth Every Morning.  Dispense: 90 tablet; Refill: 1  -     cetirizine (zyrTEC) 10 MG tablet; Take 1 tablet by mouth Daily.  Dispense: 90 tablet; Refill: 3    Will place orders  for future labs, future DEXA scan for mid October altercations  4th prolia Injection.  Patient to follow-up in almost August 2024 for repeat labs, discussed return precautions, patient verbalized understanding agreeable treatment plan.    Follow Up   Return in about 3 months (around 8/30/2024).  Patient was given instructions and counseling regarding her condition or for health maintenance advice. Please see specific information pulled into the AVS if appropriate.       Part of this note may be electronic transcription/translation of spoken language to printed text using the Dragon dictation system

## 2024-07-09 ENCOUNTER — OFFICE VISIT (OUTPATIENT)
Dept: ORTHOPEDIC SURGERY | Facility: CLINIC | Age: 64
End: 2024-07-09
Payer: MEDICARE

## 2024-07-09 VITALS
DIASTOLIC BLOOD PRESSURE: 55 MMHG | BODY MASS INDEX: 15.66 KG/M2 | HEIGHT: 65 IN | SYSTOLIC BLOOD PRESSURE: 86 MMHG | HEART RATE: 60 BPM | OXYGEN SATURATION: 94 % | WEIGHT: 94 LBS

## 2024-07-09 DIAGNOSIS — M75.81 ROTATOR CUFF TENDONITIS, RIGHT: ICD-10-CM

## 2024-07-09 DIAGNOSIS — M25.511 TRIGGER POINT OF SHOULDER REGION, RIGHT: Primary | ICD-10-CM

## 2024-07-09 DIAGNOSIS — S43.431D TEAR OF RIGHT GLENOID LABRUM, SUBSEQUENT ENCOUNTER: ICD-10-CM

## 2024-07-09 DIAGNOSIS — M25.511 RIGHT SHOULDER PAIN, UNSPECIFIED CHRONICITY: ICD-10-CM

## 2024-07-09 PROCEDURE — 1159F MED LIST DOCD IN RCRD: CPT

## 2024-07-09 PROCEDURE — 1160F RVW MEDS BY RX/DR IN RCRD: CPT

## 2024-07-09 PROCEDURE — 99213 OFFICE O/P EST LOW 20 MIN: CPT

## 2024-07-09 RX ORDER — DIAZEPAM 5 MG/1
TABLET ORAL EVERY 12 HOURS SCHEDULED
COMMUNITY

## 2024-07-09 RX ORDER — VENLAFAXINE HYDROCHLORIDE 75 MG/1
CAPSULE, EXTENDED RELEASE ORAL EVERY 24 HOURS
COMMUNITY

## 2024-07-09 RX ORDER — EZETIMIBE 10 MG/1
1 TABLET ORAL DAILY
COMMUNITY

## 2024-07-09 RX ORDER — FENOFIBRATE 145 MG/1
TABLET, COATED ORAL EVERY 24 HOURS
COMMUNITY

## 2024-07-09 RX ORDER — ZOLPIDEM TARTRATE 10 MG/1
TABLET ORAL EVERY 24 HOURS
COMMUNITY

## 2024-07-09 NOTE — PROGRESS NOTES
"Chief Complaint  Pain and Follow-up of the Right Shoulder    Subjective      Ida Vincent presents to [facility] for follow up of her right shoulder.  Patient has right shoulder pain that she has been managing conservatively for years.  She has had injections in the past.  She had an MRI on 4/29/2024 that was reviewed during her last office visit with Dr. Busch on 5/28/2024.  Patient also received a right shoulder steroid injection during that visit.  She is recommended and prescribed physical therapy as well.    Today she states she states the pain is still constant.  She states she got approximately 4 days of improved pain but not substantial enough to say it was not completely relieved.  She states that she constant feels like there is a weight on her shoulder.  She states that her the \"crick in her neck\" is slightly improved with the physical therapy that she has been attending.  She has been attending physical therapy at [facility] and comes today with the physical therapy assessment note.    Allergies   Allergen Reactions    Ciprofloxacin Anaphylaxis and Other (See Comments)    Thimerosal (Thiomersal) Swelling    Benadryl [Diphenhydramine] Other (See Comments)     \"It makes me climb the walls and shake\"    Prednisone Other (See Comments)     CHF  Pt stated solu-medrol is okay to take    Prednisolone Itching    Tetanus Immune Globulin Swelling    Thimerosal Swelling    Erythromycin Nausea And Vomiting       Objective     Vital Signs:   Vitals:    07/09/24 1422   BP: (!) 86/55   Pulse: 60   SpO2: 94%   Weight: 42.6 kg (94 lb)   Height: 165.1 cm (65\")     Body mass index is 15.64 kg/m².    I reviewed the patient's chief complaint, history of present illness, review of systems, past medical history, surgical history, family history, social history, medications, and allergy list.     REVIEW OF SYSTEMS    Constitutional: Denies fevers, chills, weight loss  Cardiovascular: Denies chest pain, " shortness of breath  Skin: Denies rashes, acute skin changes  Neurologic: Denies headache, loss of consciousness  MSK: Right shoulder pain.     Ortho Exam  Shoulder   General: Alert. No acute distress.  Right upper Extremity: 180 degrees active elevation with pain. External rotation to 60 degrees. Internal rotation to mid lumbar.  Demonstrates intact active elbow ROM. Demonstrates intact active wrist ROM. Sensation intact. Palpable radial pulse. Neurovascularly intact.            Imaging Results (Most Recent)       None                Assessment and Plan   Diagnoses and all orders for this visit:    1. Trigger point of shoulder region, right (Primary)  -     Ambulatory Referral to Pain Management    2. Right shoulder pain, unspecified chronicity    3. Tear of right glenoid labrum, subsequent encounter    4. Rotator cuff tendonitis, right         Ida Vincent presents today for a follow up of her right shoulder.. They have chronic right shoulder pain that we have been treating conservatively.    We discussed further conservative management for their chronic right shoulder pain. This includes but is not limited to - oral NSAIDs, topical NSAIDs, PT/home exercise program, intermittent steroid injections. Patient elected to continue conservative management. Home exercises were reiterated and stressed the importance of them today in office.  Also discussed continuing physical therapy as she feels like she is getting improvement to any degree.  Patient also has pain within the right shoulder trigger point area which could benefit from trigger point injections.  Pain management referral was placed for patient to have second opinion or possible trigger point injection.  Ultimately we discussed that she is eligible for repeat right shoulder steroid injection on or after August 29.  We also discussed the possibility of needing to have a right shoulder scope to help with this chronic pain that she is experiencing and the  results of the MRI.    Patient will follow-up on or after August 29 for further discussion of treatment plan options with Dr. Busch.      Tobacco Use: High Risk (7/9/2024)    Patient History     Smoking Tobacco Use: Every Day     Smokeless Tobacco Use: Never     Passive Exposure: Not on file     Patient reports tobacco use. Tobacco use can delay healing and complicate the recovery process. Recommended tobacco cessation for optimal healing and health benefits.            Follow Up   No follow-ups on file.  There are no Patient Instructions on file for this visit.  Patient was given instructions and counseling regarding her condition or for health maintenance advice. Please see specific information pulled into the AVS if appropriate.       Dictated Utilizing Dragon Dictation. Please note that portions of this note were completed with a voice recognition program. Part of this note may be an electronic transcription/translation of spoken language to printed text using the Dragon Dictation System.

## 2024-08-20 ENCOUNTER — LAB (OUTPATIENT)
Dept: LAB | Facility: HOSPITAL | Age: 64
End: 2024-08-20
Payer: MEDICARE

## 2024-08-20 DIAGNOSIS — M81.0 AGE-RELATED OSTEOPOROSIS WITHOUT CURRENT PATHOLOGICAL FRACTURE: ICD-10-CM

## 2024-08-20 DIAGNOSIS — E55.9 VITAMIN D DEFICIENCY: ICD-10-CM

## 2024-08-20 DIAGNOSIS — E78.5 HYPERLIPIDEMIA, UNSPECIFIED HYPERLIPIDEMIA TYPE: ICD-10-CM

## 2024-08-20 DIAGNOSIS — E03.9 ACQUIRED HYPOTHYROIDISM: ICD-10-CM

## 2024-08-20 LAB
25(OH)D3 SERPL-MCNC: 67.4 NG/ML (ref 30–100)
ALBUMIN SERPL-MCNC: 4.5 G/DL (ref 3.5–5.2)
ALBUMIN/GLOB SERPL: 2.1 G/DL
ALP SERPL-CCNC: 46 U/L (ref 39–117)
ALT SERPL W P-5'-P-CCNC: 8 U/L (ref 1–33)
ANION GAP SERPL CALCULATED.3IONS-SCNC: 7 MMOL/L (ref 5–15)
AST SERPL-CCNC: 13 U/L (ref 1–32)
BASOPHILS # BLD AUTO: 0.06 10*3/MM3 (ref 0–0.2)
BASOPHILS NFR BLD AUTO: 0.9 % (ref 0–1.5)
BILIRUB SERPL-MCNC: 0.2 MG/DL (ref 0–1.2)
BUN SERPL-MCNC: 7 MG/DL (ref 8–23)
BUN/CREAT SERPL: 8.6 (ref 7–25)
CALCIUM SPEC-SCNC: 9.7 MG/DL (ref 8.6–10.5)
CHLORIDE SERPL-SCNC: 100 MMOL/L (ref 98–107)
CHOLEST SERPL-MCNC: 273 MG/DL (ref 0–200)
CO2 SERPL-SCNC: 29 MMOL/L (ref 22–29)
CREAT SERPL-MCNC: 0.81 MG/DL (ref 0.57–1)
DEPRECATED RDW RBC AUTO: 42 FL (ref 37–54)
EGFRCR SERPLBLD CKD-EPI 2021: 82.2 ML/MIN/1.73
EOSINOPHIL # BLD AUTO: 0.11 10*3/MM3 (ref 0–0.4)
EOSINOPHIL NFR BLD AUTO: 1.6 % (ref 0.3–6.2)
ERYTHROCYTE [DISTWIDTH] IN BLOOD BY AUTOMATED COUNT: 12.7 % (ref 12.3–15.4)
GLOBULIN UR ELPH-MCNC: 2.1 GM/DL
GLUCOSE SERPL-MCNC: 78 MG/DL (ref 65–99)
HCT VFR BLD AUTO: 40.9 % (ref 34–46.6)
HDLC SERPL-MCNC: 55 MG/DL (ref 40–60)
HGB BLD-MCNC: 13.3 G/DL (ref 12–15.9)
IMM GRANULOCYTES # BLD AUTO: 0.04 10*3/MM3 (ref 0–0.05)
IMM GRANULOCYTES NFR BLD AUTO: 0.6 % (ref 0–0.5)
LDLC SERPL CALC-MCNC: 195 MG/DL (ref 0–100)
LDLC/HDLC SERPL: 3.5 {RATIO}
LYMPHOCYTES # BLD AUTO: 2.88 10*3/MM3 (ref 0.7–3.1)
LYMPHOCYTES NFR BLD AUTO: 43 % (ref 19.6–45.3)
MAGNESIUM SERPL-MCNC: 2.2 MG/DL (ref 1.6–2.4)
MCH RBC QN AUTO: 29.4 PG (ref 26.6–33)
MCHC RBC AUTO-ENTMCNC: 32.5 G/DL (ref 31.5–35.7)
MCV RBC AUTO: 90.3 FL (ref 79–97)
MONOCYTES # BLD AUTO: 0.42 10*3/MM3 (ref 0.1–0.9)
MONOCYTES NFR BLD AUTO: 6.3 % (ref 5–12)
NEUTROPHILS NFR BLD AUTO: 3.18 10*3/MM3 (ref 1.7–7)
NEUTROPHILS NFR BLD AUTO: 47.6 % (ref 42.7–76)
NRBC BLD AUTO-RTO: 0 /100 WBC (ref 0–0.2)
PHOSPHATE SERPL-MCNC: 4.8 MG/DL (ref 2.5–4.5)
PLATELET # BLD AUTO: 198 10*3/MM3 (ref 140–450)
PMV BLD AUTO: 11.5 FL (ref 6–12)
POTASSIUM SERPL-SCNC: 4.5 MMOL/L (ref 3.5–5.2)
PROT SERPL-MCNC: 6.6 G/DL (ref 6–8.5)
RBC # BLD AUTO: 4.53 10*6/MM3 (ref 3.77–5.28)
SODIUM SERPL-SCNC: 136 MMOL/L (ref 136–145)
T-UPTAKE NFR SERPL: 1.1 TBI (ref 0.8–1.3)
T4 SERPL-MCNC: 9.65 MCG/DL (ref 4.5–11.7)
TRIGL SERPL-MCNC: 127 MG/DL (ref 0–150)
TSH SERPL DL<=0.05 MIU/L-ACNC: 1.66 UIU/ML (ref 0.27–4.2)
VLDLC SERPL-MCNC: 23 MG/DL (ref 5–40)
WBC NRBC COR # BLD AUTO: 6.69 10*3/MM3 (ref 3.4–10.8)

## 2024-08-20 PROCEDURE — 84443 ASSAY THYROID STIM HORMONE: CPT

## 2024-08-20 PROCEDURE — 83735 ASSAY OF MAGNESIUM: CPT

## 2024-08-20 PROCEDURE — 84479 ASSAY OF THYROID (T3 OR T4): CPT

## 2024-08-20 PROCEDURE — 80061 LIPID PANEL: CPT

## 2024-08-20 PROCEDURE — 84436 ASSAY OF TOTAL THYROXINE: CPT

## 2024-08-20 PROCEDURE — 82306 VITAMIN D 25 HYDROXY: CPT

## 2024-08-20 PROCEDURE — 85025 COMPLETE CBC W/AUTO DIFF WBC: CPT

## 2024-08-20 PROCEDURE — 80053 COMPREHEN METABOLIC PANEL: CPT

## 2024-08-20 PROCEDURE — 84100 ASSAY OF PHOSPHORUS: CPT

## 2024-08-26 ENCOUNTER — OFFICE VISIT (OUTPATIENT)
Dept: CARDIOLOGY | Facility: CLINIC | Age: 64
End: 2024-08-26
Payer: MEDICARE

## 2024-08-26 VITALS
HEIGHT: 65 IN | BODY MASS INDEX: 16.33 KG/M2 | SYSTOLIC BLOOD PRESSURE: 106 MMHG | DIASTOLIC BLOOD PRESSURE: 55 MMHG | HEART RATE: 66 BPM | WEIGHT: 98 LBS

## 2024-08-26 DIAGNOSIS — R00.2 PALPITATIONS: ICD-10-CM

## 2024-08-26 DIAGNOSIS — I25.10 CORONARY ARTERY CALCIFICATION: Primary | ICD-10-CM

## 2024-08-26 DIAGNOSIS — I25.84 CORONARY ARTERY CALCIFICATION: Primary | ICD-10-CM

## 2024-08-26 DIAGNOSIS — E78.2 MIXED HYPERLIPIDEMIA: ICD-10-CM

## 2024-08-26 DIAGNOSIS — Z72.0 TOBACCO ABUSE: ICD-10-CM

## 2024-08-26 PROCEDURE — 99214 OFFICE O/P EST MOD 30 MIN: CPT | Performed by: INTERNAL MEDICINE

## 2024-08-26 NOTE — PROGRESS NOTES
Chief Complaint  Palpitations (Follow Up) and Dizziness    Subjective        Ida Vincent presents to [facility] CARDIOLOGY  History of present illness:    Patient states the palpitations are doing well as long as she does not miss a dose of the Toprol.  She does note dizziness a lot when she is up moving around.  She has not passed out.  She stopped the Crestor on her own and had very high cholesterol back a week ago.  She has started the Crestor back.  She is trying to drink some water.  She does drink a lot of diet Pepsi's.  She notes no bleeding problems.  She has cut back to less than 1/2 pack of cigarettes a day.      Past Medical History:   Diagnosis Date    Abnormal ECG     Allergic     Anxiety 1999    Arthritis     Asthma     Cholelithiasis 1999    Congenital heart disease     COPD (chronic obstructive pulmonary disease)     Coronary artery disease     DDD (degenerative disc disease), lumbar     Depression 1999    Diverticulosis 2021    Fibromyalgia     Fibromyalgia, primary     GERD (gastroesophageal reflux disease)     Heart block     History of diverticulosis     History of palpitations     Hyperlipemia     Hypothyroidism     Low back pain     Lumbago     Osteopenia 2021    Pneumonia 1976    Premature ventricular contractions (PVCs) (VPCs)     HISTORY OF    Seasonal allergies     Slow to wake up after anesthesia     Substance abuse     Last drink Nov2019    Thyroid disorder          Past Surgical History:   Procedure Laterality Date    ABDOMINOPLASTY      ATRIAL SEPTAL DEFECT REPAIR      BREAST AUGMENTATION      BREAST IMPLANT SURGERY Bilateral 10/18/2021    Procedure: BILATERAL REMOVAL OF IMPLANTS;  Surgeon: BRUCE Suresh MD;  Location: [facility];  Service: Plastics;  Laterality: Bilateral;    BREAST SURGERY  10/18/21    CARDIAC CATHETERIZATION      CHOLECYSTECTOMY  1999    COLONOSCOPY  2011    COSMETIC SURGERY      Tummy tuck    DENTAL PROCEDURE      ENDOSCOPY  2011     GALLBLADDER SURGERY      HYSTERECTOMY      MASTOPEXY Bilateral 10/18/2021    Procedure: BILATERAL MASTOPEXY;  Surgeon: BRUCE Suresh MD;  Location: [facility] MAIN OR;  Service: Plastics;  Laterality: Bilateral;    OSTEOTOMY      OTHER SURGICAL HISTORY      metal implants     OTHER SURGICAL HISTORY      surgical clips RIGHT FACE ARTERY WITH JAW SURGERY    SEPTOPLASTY  2010    SHOULDER ARTHROSCOPIC ACROMIOCLAVICULAR (AC) JOINT RECONSTRUCTION Right     SINUS SURGERY      SPINE SURGERY  2021    TONSILLECTOMY      TOTAL ABDOMINAL HYSTERECTOMY WITH SALPINGO OOPHORECTOMY      TVH    VERTEBROPLASTY  06/04/2021    Lumbar 2          Social History     Socioeconomic History    Marital status:    Tobacco Use    Smoking status: Every Day     Current packs/day: 0.50     Average packs/day: 0.5 packs/day for 45.7 years (22.8 ttl pk-yrs)     Types: Cigarettes     Start date: 4/1/1976     Last attempt to quit: 4/1/2021    Smokeless tobacco: Never   Vaping Use    Vaping status: Every Day    Substances: Nicotine   Substance and Sexual Activity    Alcohol use: Not Currently    Drug use: Not Currently     Frequency: 7.0 times per week     Types: Marijuana     Comment: Patient states she uses marijuana every night-01/06/2022    Sexual activity: Not Currently     Partners: Male     Birth control/protection: Vasectomy, Hysterectomy         Family History   Problem Relation Age of Onset    Diabetes Mother     Hypertension Mother     Anxiety disorder Mother     Arthritis Mother     Alcohol abuse Mother     Thyroid disease Mother     Vision loss Mother     Hyperlipidemia Mother     Heart attack Mother     Heart disease Mother     Cancer Father     Arthritis Father     Early death Father     Mental illness Sister     Arthritis Sister     Hyperlipidemia Sister     Early death Brother     Diabetes Brother     Arthritis Brother     Liver disease Brother     Heart disease Daughter         Cardiac ablation    Other Son         Gastro Problems  "   Malig Hyperthermia Neg Hx           Allergies   Allergen Reactions    Ciprofloxacin Anaphylaxis and Other (See Comments)    Thimerosal (Thiomersal) Swelling    Benadryl [Diphenhydramine] Other (See Comments)     \"It makes me climb the walls and shake\"    Prednisone Other (See Comments)     CHF  Pt stated solu-medrol is okay to take    Prednisolone Itching    Tetanus Immune Globulin Swelling    Thimerosal Swelling    Erythromycin Nausea And Vomiting            Current Outpatient Medications:     albuterol sulfate  (90 Base) MCG/ACT inhaler, Inhale 2 puffs Every 4 (Four) Hours As Needed., Disp: , Rfl:     aspirin (ASPIRIN LOW DOSE) 81 MG EC tablet, Take 1 tablet by mouth Daily., Disp: 90 tablet, Rfl: 3    Calcium Citrate-Vitamin D3 (CITRACAL) 315-6.25 MG-MCG tablet tablet, 1 tablet Daily., Disp: , Rfl:     cetirizine (zyrTEC) 10 MG tablet, Take 1 tablet by mouth Daily., Disp: 90 tablet, Rfl: 3    coenzyme Q10 100 MG capsule, Take 1 capsule by mouth Daily., Disp: , Rfl:     Denosumab (PROLIA SC), Inject  under the skin into the appropriate area as directed Every 6 (Six) Months., Disp: , Rfl:     gabapentin (NEURONTIN) 300 MG capsule, Take 1 capsule by mouth 2 (Two) Times a Day., Disp: 180 capsule, Rfl: 1    levothyroxine (SYNTHROID, LEVOTHROID) 50 MCG tablet, Take 1 tablet by mouth Every Morning., Disp: 90 tablet, Rfl: 1    magnesium oxide (MAG-OX) 400 MG tablet, Take 1 tablet by mouth Daily., Disp: , Rfl:     metoprolol succinate XL (TOPROL-XL) 25 MG 24 hr tablet, Take 1 tablet by mouth 2 (Two) Times a Day., Disp: 180 tablet, Rfl: 1    metoprolol succinate XL (TOPROL-XL) 50 MG 24 hr tablet, Take 1 tablet by mouth 2 (Two) Times a Day., Disp: 180 tablet, Rfl: 1    NON FORMULARY, TUMERIC, Disp: , Rfl:     ondansetron (ZOFRAN) 8 MG tablet, Take 1 tablet by mouth Every 8 (Eight) Hours As Needed for Nausea or Vomiting., Disp: 30 tablet, Rfl: 5    pantoprazole (PROTONIX) 20 MG EC tablet, Take 1 tablet by mouth " "Daily., Disp: 90 tablet, Rfl: 1    QUEtiapine Fumarate 150 MG tablet, Take 100 mg by mouth Every Night., Disp: , Rfl:     rosuvastatin (CRESTOR) 40 MG tablet, Take 1 tablet by mouth Every Night., Disp: 90 tablet, Rfl: 1    traZODone (DESYREL) 100 MG tablet, Take 2 tablets by mouth Every Night., Disp: , Rfl:       ROS:  Cardiac review of systems is positive for positional dizziness.    Objective     /55   Pulse 66   Ht 165.1 cm (65\")   Wt 44.5 kg (98 lb)   BMI 16.31 kg/m²       General Appearance:   well developed  well nourished  HENT:   oropharynx moist  lips not cyanotic  Respiratory:  no respiratory distress  normal breath sounds  no rales  Cardiovascular:  no jugular venous distention  regular rhythm  S1 normal, S2 normal  no S3, no S4   no murmur  no rub, no thrill  No carotid bruit  pedal pulses normal  lower extremity edema: none    Musculoskeletal:  no clubbing of fingers.   normocephalic, head atraumatic  Skin:   warm, dry  Psychiatric:  judgement and insight appropriate  normal mood and affect    ECHO:    STRESS:    CATH:  No results found for this or any previous visit.    BMP:     Glucose   Date Value Ref Range Status   08/20/2024 78 65 - 99 mg/dL Final     BUN   Date Value Ref Range Status   08/20/2024 7 (L) 8 - 23 mg/dL Final     Creatinine   Date Value Ref Range Status   08/20/2024 0.81 0.57 - 1.00 mg/dL Final     Sodium   Date Value Ref Range Status   08/20/2024 136 136 - 145 mmol/L Final     Potassium   Date Value Ref Range Status   08/20/2024 4.5 3.5 - 5.2 mmol/L Final     Chloride   Date Value Ref Range Status   08/20/2024 100 98 - 107 mmol/L Final     CO2   Date Value Ref Range Status   08/20/2024 29.0 22.0 - 29.0 mmol/L Final     Calcium   Date Value Ref Range Status   08/20/2024 9.7 8.6 - 10.5 mg/dL Final     BUN/Creatinine Ratio   Date Value Ref Range Status   08/20/2024 8.6 7.0 - 25.0 Final     Anion Gap   Date Value Ref Range Status   08/20/2024 7.0 5.0 - 15.0 mmol/L Final     eGFR "   Date Value Ref Range Status   08/20/2024 82.2 >60.0 mL/min/1.73 Final     LIPIDS:  Total Cholesterol   Date Value Ref Range Status   08/20/2024 273 (H) 0 - 200 mg/dL Final     Triglycerides   Date Value Ref Range Status   08/20/2024 127 0 - 150 mg/dL Final     HDL Cholesterol   Date Value Ref Range Status   08/20/2024 55 40 - 60 mg/dL Final     LDL Cholesterol    Date Value Ref Range Status   08/20/2024 195 (H) 0 - 100 mg/dL Final     VLDL Cholesterol   Date Value Ref Range Status   08/20/2024 23 5 - 40 mg/dL Final     LDL/HDL Ratio   Date Value Ref Range Status   08/20/2024 3.50  Final         Procedures             ASSESSMENT:  Diagnoses and all orders for this visit:    1. Coronary artery calcification (Primary)    2. Mixed hyperlipidemia    3. Palpitations    4. Tobacco abuse         PLAN:    1.  Encouraged the patient to increase her water and salt intake and cut back on the diet Pepsi's.  If this does not resolve her orthostasis at that time we would have to consider possibly Florinef or midodrine.  I told her certainly the Toprol is making this a little bit worse but it does prevent her palpitations so we will keep it on.  2.  Patient stopped her Crestor on her own and her LDL was 195, HDL 55, and triglycerides 127 when checked 8/20/2024.  She is going to go back on the Crestor.  3.  Continue the aspirin.  The patient notes no problems with bleeding.  4.  Encouraged smoking cessation.  The patient is unable to use the patches and got nauseated on the Chantix.  5.  Patient's blood pressure is chronically on the low end.  I do think this is making her dizziness worse.  We will continue to monitor.      Return in about 2 months (around 10/26/2024) for edouard quiros.     Patient was given instructions and counseling regarding her condition or for health maintenance advice. Please see specific information pulled into the AVS if appropriate.         Grayson Vang MD   8/26/2024  15:51 EDT

## 2024-08-30 ENCOUNTER — OFFICE VISIT (OUTPATIENT)
Dept: FAMILY MEDICINE CLINIC | Facility: CLINIC | Age: 64
End: 2024-08-30
Payer: MEDICARE

## 2024-08-30 VITALS
BODY MASS INDEX: 15.66 KG/M2 | WEIGHT: 94 LBS | SYSTOLIC BLOOD PRESSURE: 102 MMHG | OXYGEN SATURATION: 98 % | HEIGHT: 65 IN | TEMPERATURE: 97.5 F | HEART RATE: 60 BPM | DIASTOLIC BLOOD PRESSURE: 66 MMHG

## 2024-08-30 DIAGNOSIS — G62.9 NEUROPATHY: ICD-10-CM

## 2024-08-30 DIAGNOSIS — E03.9 ACQUIRED HYPOTHYROIDISM: ICD-10-CM

## 2024-08-30 DIAGNOSIS — Z79.899 MEDICATION MANAGEMENT: ICD-10-CM

## 2024-08-30 DIAGNOSIS — M79.7 FIBROMYALGIA: ICD-10-CM

## 2024-08-30 DIAGNOSIS — J45.909 ASTHMA, UNSPECIFIED ASTHMA SEVERITY, UNSPECIFIED WHETHER COMPLICATED, UNSPECIFIED WHETHER PERSISTENT: Primary | ICD-10-CM

## 2024-08-30 LAB
AMPHET+METHAMPHET UR QL: NEGATIVE
AMPHETAMINE INTERNAL CONTROL: ABNORMAL
AMPHETAMINES UR QL: NEGATIVE
BARBITURATE INTERNAL CONTROL: ABNORMAL
BARBITURATES UR QL SCN: NEGATIVE
BENZODIAZ UR QL SCN: NEGATIVE
BENZODIAZEPINE INTERNAL CONTROL: ABNORMAL
BUPRENORPHINE INTERNAL CONTROL: ABNORMAL
BUPRENORPHINE SERPL-MCNC: NEGATIVE NG/ML
CANNABINOIDS SERPL QL: POSITIVE
COCAINE INTERNAL CONTROL: ABNORMAL
COCAINE UR QL: NEGATIVE
EXPIRATION DATE: ABNORMAL
Lab: ABNORMAL
MDMA (ECSTASY) INTERNAL CONTROL: ABNORMAL
MDMA UR QL SCN: NEGATIVE
METHADONE INTERNAL CONTROL: ABNORMAL
METHADONE UR QL SCN: NEGATIVE
METHAMPHETAMINE INTERNAL CONTROL: ABNORMAL
MORPHINE INTERNAL CONTROL: ABNORMAL
MORPHINE/OPIATES SCREEN, URINE: NEGATIVE
OXYCODONE INTERNAL CONTROL: ABNORMAL
OXYCODONE UR QL SCN: NEGATIVE
PCP UR QL SCN: NEGATIVE
PHENCYCLIDINE INTERNAL CONTROL: ABNORMAL
THC INTERNAL CONTROL: ABNORMAL

## 2024-08-30 PROCEDURE — 1160F RVW MEDS BY RX/DR IN RCRD: CPT | Performed by: NURSE PRACTITIONER

## 2024-08-30 PROCEDURE — 1125F AMNT PAIN NOTED PAIN PRSNT: CPT | Performed by: NURSE PRACTITIONER

## 2024-08-30 PROCEDURE — 80305 DRUG TEST PRSMV DIR OPT OBS: CPT | Performed by: NURSE PRACTITIONER

## 2024-08-30 PROCEDURE — 1159F MED LIST DOCD IN RCRD: CPT | Performed by: NURSE PRACTITIONER

## 2024-08-30 PROCEDURE — 99214 OFFICE O/P EST MOD 30 MIN: CPT | Performed by: NURSE PRACTITIONER

## 2024-08-30 RX ORDER — GABAPENTIN 300 MG/1
300 CAPSULE ORAL 2 TIMES DAILY
Qty: 180 CAPSULE | Refills: 1 | Status: SHIPPED | OUTPATIENT
Start: 2024-08-30

## 2024-08-30 RX ORDER — CETIRIZINE HYDROCHLORIDE 10 MG/1
10 TABLET ORAL DAILY
Qty: 90 TABLET | Refills: 3 | Status: SHIPPED | OUTPATIENT
Start: 2024-08-30

## 2024-08-30 RX ORDER — LEVOTHYROXINE SODIUM 50 UG/1
50 TABLET ORAL
Qty: 90 TABLET | Refills: 1 | Status: SHIPPED | OUTPATIENT
Start: 2024-08-30

## 2024-08-30 NOTE — PROGRESS NOTES
Chief Complaint  Follow-up (3m F/u), Med Refill, and Shoulder Pain (Right shoulder pain, pt reports that this is a chronic issue with them and it seeing Orthopedics for further workup and management. Pt states out of 10, it is a 7 on the pain scale. )    Subjective        Medical History: has a past medical history of Abnormal ECG, Allergic, Anxiety (1999), Arthritis, Asthma, Cholelithiasis (1999), Congenital heart disease, COPD (chronic obstructive pulmonary disease), Coronary artery disease, DDD (degenerative disc disease), lumbar, Depression (1999), Diverticulosis (2021), Fibromyalgia, Fibromyalgia, primary, GERD (gastroesophageal reflux disease), Heart block, History of diverticulosis, History of palpitations, Hyperlipemia, Hypothyroidism, Low back pain, Lumbago, Osteopenia (2021), Pneumonia (1976), Premature ventricular contractions (PVCs) (VPCs), Seasonal allergies, Slow to wake up after anesthesia, Substance abuse, and Thyroid disorder.     Surgical History: has a past surgical history that includes Vertebroplasty (06/04/2021); Tonsillectomy; Hysterectomy; shoulder arthroscopic acromioclavicular (ac) joint reconstruction (Right); Cardiac catheterization; Breast Augmentation; Osteotomy; ASD repair; Dental surgery; Colonoscopy (2011); Esophagogastroduodenoscopy (2011); Gallbladder surgery; Other surgical history; Other surgical history; Abdominoplasty; Mastopexy (Bilateral, 10/18/2021); Breast Implant Revision (Bilateral, 10/18/2021); Breast surgery (10/18/21); Cholecystectomy (1999); Cosmetic surgery; Septoplasty (2010); Spine surgery (2021); Total abdominal hysterectomy w/ bilateral salpingoophorectomy; and Sinus surgery.     Family History: family history includes Alcohol abuse in her mother; Anxiety disorder in her mother; Arthritis in her brother, father, mother, and sister; Cancer in her father; Diabetes in her brother and mother; Early death in her brother and father; Heart attack in her mother; Heart  "disease in her daughter and mother; Hyperlipidemia in her mother and sister; Hypertension in her mother; Liver disease in her brother; Mental illness in her sister; Other in her son; Thyroid disease in her mother; Vision loss in her mother.     Social History: reports that she has been smoking cigarettes. She started smoking about 48 years ago. She has a 22.8 pack-year smoking history. She has never used smokeless tobacco. She reports that she does not currently use alcohol. She reports that she does not currently use drugs after having used the following drugs: Marijuana. Frequency: 7.00 times per week.    Ida Vincent presents to [facility]  Follow-up  She has a current past medical history of fibromyalgia, hypothyroidism, hypertension, heart palpitations with PVCs, alcohol dependence, age-related osteoporosis, current nicotine every day 1/4 pack user and current user of THC nightly to help with sleep.   Hypothyroidism  This is a chronic problem. The current episode started more than 1 year ago. The problem occurs constantly. The problem has been unchanged. Pertinent negatives include no arthralgias, congestion, coughing, joint swelling, neck pain, swollen glands or vertigo. Nothing aggravates the symptoms. Treatments tried: Levothyroxine. The treatment provided significant relief  Hyperlipidemia  This is a chronic problem. The current episode started more than 1 year ago. The problem is controlled. Pertinent negatives include no chest pain or shortness of breath. Current antihyperlipidemic treatment includes statins. The current treatment provides significant improvement of lipids. There are no compliance problems.  Risk factors for coronary artery disease include dyslipidemia and post-menopausal.   Objective   Vital Signs:   /66 (BP Location: Left arm, Patient Position: Sitting, Cuff Size: Adult)   Pulse 60   Temp 97.5 °F (36.4 °C) (Infrared)   Ht 165.1 cm (65\")   " Wt 42.6 kg (94 lb)   SpO2 98%   BMI 15.64 kg/m²       Wt Readings from Last 3 Encounters:   08/30/24 42.6 kg (94 lb)   08/26/24 44.5 kg (98 lb)   07/09/24 42.6 kg (94 lb)        BP Readings from Last 3 Encounters:   08/30/24 102/66   08/26/24 106/55   07/09/24 (!) 86/55      Physical Exam  Vitals reviewed.   Constitutional:       Appearance: Normal appearance. She is well-developed and underweight.   HENT:      Head: Normocephalic and atraumatic.   Eyes:      Conjunctiva/sclera: Conjunctivae normal.      Pupils: Pupils are equal, round, and reactive to light.   Cardiovascular:      Rate and Rhythm: Normal rate and regular rhythm.      Heart sounds: No murmur heard.  Pulmonary:      Effort: Pulmonary effort is normal.      Breath sounds: Normal breath sounds. No wheezing.   Skin:     General: Skin is warm and dry.   Neurological:      Mental Status: She is alert and oriented to person, place, and time.   Psychiatric:         Mood and Affect: Mood and affect normal.         Behavior: Behavior normal.         Thought Content: Thought content normal.         Judgment: Judgment normal.        Result Review :  {The following data was reviewed by BRYANT Ruelas on 08/30/2024.  Common labs          11/13/2023    15:04 3/13/2024    14:21 8/20/2024    16:04   Common Labs   Glucose 75  83  78    BUN 9  7  7    Creatinine 0.67  0.69  0.81    Sodium 138  138  136    Potassium 4.6  4.4  4.5    Chloride 100  98  100    Calcium 9.4  9.1  9.7    Albumin 4.9  4.7  4.5    Total Bilirubin 0.3  0.2  0.2    Alkaline Phosphatase 40  43  46    AST (SGOT) 20  16  13    ALT (SGPT) 15  9  8    WBC 6.82   6.69    Hemoglobin 14.0   13.3    Hematocrit 41.9   40.9    Platelets 190   198    Total Cholesterol 203   273    Triglycerides 138   127    HDL Cholesterol 66   55    LDL Cholesterol  113   195      Data reviewed : previous office note             Current Outpatient Medications on File Prior to Visit   Medication Sig Dispense  Refill    albuterol sulfate  (90 Base) MCG/ACT inhaler Inhale 2 puffs Every 4 (Four) Hours As Needed.      aspirin (ASPIRIN LOW DOSE) 81 MG EC tablet Take 1 tablet by mouth Daily. 90 tablet 3    Calcium Citrate-Vitamin D3 (CITRACAL) 315-6.25 MG-MCG tablet tablet 1 tablet Daily.      coenzyme Q10 100 MG capsule Take 1 capsule by mouth Daily.      Denosumab (PROLIA SC) Inject  under the skin into the appropriate area as directed Every 6 (Six) Months.      magnesium oxide (MAG-OX) 400 MG tablet Take 1 tablet by mouth Daily.      metoprolol succinate XL (TOPROL-XL) 25 MG 24 hr tablet Take 1 tablet by mouth 2 (Two) Times a Day. 180 tablet 1    metoprolol succinate XL (TOPROL-XL) 50 MG 24 hr tablet Take 1 tablet by mouth 2 (Two) Times a Day. 180 tablet 1    NON FORMULARY TUMERIC      ondansetron (ZOFRAN) 8 MG tablet Take 1 tablet by mouth Every 8 (Eight) Hours As Needed for Nausea or Vomiting. 30 tablet 5    pantoprazole (PROTONIX) 20 MG EC tablet Take 1 tablet by mouth Daily. 90 tablet 1    QUEtiapine Fumarate 150 MG tablet Take 100 mg by mouth Every Night.      traZODone (DESYREL) 100 MG tablet Take 2 tablets by mouth Every Night.      [DISCONTINUED] cetirizine (zyrTEC) 10 MG tablet Take 1 tablet by mouth Daily. 90 tablet 3    [DISCONTINUED] gabapentin (NEURONTIN) 300 MG capsule Take 1 capsule by mouth 2 (Two) Times a Day. 180 capsule 1    [DISCONTINUED] levothyroxine (SYNTHROID, LEVOTHROID) 50 MCG tablet Take 1 tablet by mouth Every Morning. 90 tablet 1    rosuvastatin (CRESTOR) 40 MG tablet Take 1 tablet by mouth Every Night. (Patient not taking: Reported on 8/30/2024) 90 tablet 1     No current facility-administered medications on file prior to visit.      Pain Management Panel  More data may exist         Latest Ref Rng & Units 8/30/2024 5/9/2023   Pain Management Panel   Amphetamine, Urine Qual Negative Negative  Negative    Barbiturates Screen, Urine Negative Negative  Negative    Benzodiazepine Screen,  Urine Negative Negative  Negative    Buprenorphine, Screen, Urine Negative Negative  Negative    Cocaine Screen, Urine Negative Negative  Negative    Methadone Screen , Urine Negative Negative  Negative    Methamphetamine, Ur Negative Negative  Negative       Details                  Assessment and Plan  Diagnoses and all orders for this visit:    1. Asthma, unspecified asthma severity, unspecified whether complicated, unspecified whether persistent (Primary)  -     cetirizine (zyrTEC) 10 MG tablet; Take 1 tablet by mouth Daily.  Dispense: 90 tablet; Refill: 3    2. Acquired hypothyroidism  -     levothyroxine (SYNTHROID, LEVOTHROID) 50 MCG tablet; Take 1 tablet by mouth Every Morning.  Dispense: 90 tablet; Refill: 1    3. Neuropathy  Comments:  Will increase gabapentin to 300 mg twice daily, patient is agreeable  Orders:  -     gabapentin (NEURONTIN) 300 MG capsule; Take 1 capsule by mouth 2 (Two) Times a Day.  Dispense: 180 capsule; Refill: 1    4. Fibromyalgia  -     gabapentin (NEURONTIN) 300 MG capsule; Take 1 capsule by mouth 2 (Two) Times a Day.  Dispense: 180 capsule; Refill: 1    5. Medication management  -     POC Medline 12 Panel Urine Drug Screen    Patient needing refill of gabapentin UDS and controlled substance contract agreement updated, and appropriate.    Follow Up   Return in about 2 months (around 10/30/2024).  Patient was given instructions and counseling regarding her condition or for health maintenance advice. Please see specific information pulled into the AVS if appropriate.       Part of this note may be electronic transcription/translation of spoken language to printed text using the Dragon dictation system

## 2024-09-03 ENCOUNTER — OFFICE VISIT (OUTPATIENT)
Dept: ORTHOPEDIC SURGERY | Facility: CLINIC | Age: 64
End: 2024-09-03
Payer: MEDICARE

## 2024-09-03 VITALS
BODY MASS INDEX: 15.58 KG/M2 | WEIGHT: 93.5 LBS | HEIGHT: 65 IN | OXYGEN SATURATION: 95 % | DIASTOLIC BLOOD PRESSURE: 64 MMHG | SYSTOLIC BLOOD PRESSURE: 120 MMHG | HEART RATE: 63 BPM

## 2024-09-03 DIAGNOSIS — M25.511 TRIGGER POINT OF SHOULDER REGION, RIGHT: Primary | ICD-10-CM

## 2024-09-03 DIAGNOSIS — M25.511 RIGHT SHOULDER PAIN, UNSPECIFIED CHRONICITY: ICD-10-CM

## 2024-09-03 DIAGNOSIS — M75.81 ROTATOR CUFF TENDONITIS, RIGHT: ICD-10-CM

## 2024-09-03 RX ADMIN — LIDOCAINE HYDROCHLORIDE 5 ML: 10 INJECTION, SOLUTION INFILTRATION; PERINEURAL at 15:03

## 2024-09-03 RX ADMIN — TRIAMCINOLONE ACETONIDE 40 MG: 40 INJECTION, SUSPENSION INTRA-ARTICULAR; INTRAMUSCULAR at 15:03

## 2024-09-03 NOTE — PROGRESS NOTES
"Chief Complaint  Follow-up of the Right Shoulder     Subjective      Ida Vincent presents to [facility] for a follow up for her right shoulder. She has had shoulder pain for the last 36 years. She reports she has had prior injections in the past and these gave her great relief. She reports she is under a lot of stress due to taking care of her mother and her . She locates her pain to the posterior shoulder and trapezius.     Allergies   Allergen Reactions    Ciprofloxacin Anaphylaxis and Other (See Comments)    Thimerosal (Thiomersal) Swelling    Benadryl [Diphenhydramine] Other (See Comments)     \"It makes me climb the walls and shake\"    Prednisone Other (See Comments)     CHF  Pt stated solu-medrol is okay to take    Prednisolone Itching    Tetanus Immune Globulin Swelling    Thimerosal Swelling    Erythromycin Nausea And Vomiting        Social History     Socioeconomic History    Marital status:    Tobacco Use    Smoking status: Every Day     Current packs/day: 0.50     Average packs/day: 0.5 packs/day for 45.7 years (22.8 ttl pk-yrs)     Types: Cigarettes     Start date: 4/1/1976     Last attempt to quit: 4/1/2021    Smokeless tobacco: Never   Vaping Use    Vaping status: Every Day    Substances: Nicotine   Substance and Sexual Activity    Alcohol use: Not Currently    Drug use: Not Currently     Frequency: 7.0 times per week     Types: Marijuana     Comment: Patient states she uses marijuana every night-01/06/2022    Sexual activity: Not Currently     Partners: Male     Birth control/protection: Vasectomy, Hysterectomy        I reviewed the patient's chief complaint, history of present illness, review of systems, past medical history, surgical history, family history, social history, medications, and allergy list.     Review of Systems     Constitutional: Denies fevers, chills, weight loss  Cardiovascular: Denies chest pain, shortness of breath  Skin: Denies " "rashes, acute skin changes  Neurologic: Denies headache, loss of consciousness  MSK: Right shoulder pain       Vital Signs:   /64   Pulse 63   Ht 165.1 cm (65\")   Wt 42.4 kg (93 lb 8 oz)   SpO2 95%   BMI 15.56 kg/m²          Physical Exam  General: Alert. No acute distress    Ortho Exam      Right upper extremity: positive impingement test, sensation intact to the medial , radial and ulnar nerve, pain with range of motion, neurovascularly intact       Large Joint Arthrocentesis: R subacromial bursa  Date/Time: 9/3/2024 3:03 PM  Consent given by: patient  Site marked: site marked  Timeout: Immediately prior to procedure a time out was called to verify the correct patient, procedure, equipment, support staff and site/side marked as required   Supporting Documentation  Indications: pain   Procedure Details  Location: shoulder - R subacromial bursa  Needle gauge: 21 G.  Medications administered: 5 mL lidocaine 1 %; 40 mg triamcinolone acetonide 40 MG/ML  Patient tolerance: patient tolerated the procedure well with no immediate complications        This injection documentation was Scribed for Marilyn Busch MD by Gustavo Arias.  09/03/24   15:03 EDT      Imaging Results (Most Recent)       None             Result Review :       No results found.           Assessment and Plan     Diagnoses and all orders for this visit:    1. Trigger point of shoulder region, right (Primary)  -     Ambulatory Referral to Pain Management    2. Right shoulder pain, unspecified chronicity    3. Rotator cuff tendonitis, right    Other orders  -     Large Joint Arthrocentesis: R subacromial bursa      The patient presents here today for a follow up for her right shoulder.     Discussed the risks and benefits of a right shoulder steroid injection today in the office. Patient expressed understanding and wishes to proceed. She tolerated the injection well and without any complications.     Home exercises given today.     Referral placed " today for pain management.     Call or return if worsening symptoms.    Follow Up     PRN       Patient was given instructions and counseling regarding her condition or for health maintenance advice. Please see specific information pulled into the AVS if appropriate.     Transcribed for Marilyn Busch MD by Charissa Camacho   09/03/24   15:03 EDT    I have personally performed the services described in this document as scribed by the above individual and it is both accurate and complete. Marilyn Busch MD 09/05/24

## 2024-09-05 RX ORDER — LIDOCAINE HYDROCHLORIDE 10 MG/ML
5 INJECTION, SOLUTION INFILTRATION; PERINEURAL
Status: COMPLETED | OUTPATIENT
Start: 2024-09-03 | End: 2024-09-03

## 2024-09-05 RX ORDER — TRIAMCINOLONE ACETONIDE 40 MG/ML
40 INJECTION, SUSPENSION INTRA-ARTICULAR; INTRAMUSCULAR
Status: COMPLETED | OUTPATIENT
Start: 2024-09-03 | End: 2024-09-03

## 2024-09-24 ENCOUNTER — HOSPITAL ENCOUNTER (OUTPATIENT)
Dept: INFUSION THERAPY | Facility: HOSPITAL | Age: 64
Discharge: HOME | End: 2024-09-24
Admitting: NURSE PRACTITIONER
Payer: MEDICARE

## 2024-09-24 VITALS
WEIGHT: 93.92 LBS | HEIGHT: 65 IN | OXYGEN SATURATION: 97 % | TEMPERATURE: 97.6 F | BODY MASS INDEX: 15.65 KG/M2 | SYSTOLIC BLOOD PRESSURE: 113 MMHG | RESPIRATION RATE: 20 BRPM | DIASTOLIC BLOOD PRESSURE: 72 MMHG | HEART RATE: 57 BPM

## 2024-09-24 DIAGNOSIS — M81.0 AGE-RELATED OSTEOPOROSIS WITHOUT CURRENT PATHOLOGICAL FRACTURE: Primary | ICD-10-CM

## 2024-09-24 LAB
ALBUMIN SERPL-MCNC: 4.5 G/DL (ref 3.5–5.2)
ALBUMIN/GLOB SERPL: 1.7 G/DL
ALP SERPL-CCNC: 43 U/L (ref 39–117)
ALT SERPL W P-5'-P-CCNC: 8 U/L (ref 1–33)
ANION GAP SERPL CALCULATED.3IONS-SCNC: 11.1 MMOL/L (ref 5–15)
AST SERPL-CCNC: 15 U/L (ref 1–32)
BILIRUB SERPL-MCNC: 0.3 MG/DL (ref 0–1.2)
BUN SERPL-MCNC: 7 MG/DL (ref 8–23)
BUN/CREAT SERPL: 9.6 (ref 7–25)
CALCIUM SPEC-SCNC: 9.4 MG/DL (ref 8.6–10.5)
CHLORIDE SERPL-SCNC: 97 MMOL/L (ref 98–107)
CO2 SERPL-SCNC: 27.9 MMOL/L (ref 22–29)
CREAT SERPL-MCNC: 0.73 MG/DL (ref 0.57–1)
EGFRCR SERPLBLD CKD-EPI 2021: 93.1 ML/MIN/1.73
GLOBULIN UR ELPH-MCNC: 2.7 GM/DL
GLUCOSE SERPL-MCNC: 94 MG/DL (ref 65–99)
POTASSIUM SERPL-SCNC: 3.9 MMOL/L (ref 3.5–5.2)
PROT SERPL-MCNC: 7.2 G/DL (ref 6–8.5)
SODIUM SERPL-SCNC: 136 MMOL/L (ref 136–145)

## 2024-09-24 PROCEDURE — 80053 COMPREHEN METABOLIC PANEL: CPT | Performed by: NURSE PRACTITIONER

## 2024-09-24 PROCEDURE — 25010000002 DENOSUMAB 60 MG/ML SOLUTION PREFILLED SYRINGE: Performed by: NURSE PRACTITIONER

## 2024-09-24 PROCEDURE — 96372 THER/PROPH/DIAG INJ SC/IM: CPT

## 2024-09-24 PROCEDURE — 36415 COLL VENOUS BLD VENIPUNCTURE: CPT

## 2024-09-24 RX ADMIN — DENOSUMAB 60 MG: 60 INJECTION SUBCUTANEOUS at 16:38

## 2024-09-25 ENCOUNTER — TRANSCRIBE ORDERS (OUTPATIENT)
Dept: ADMINISTRATIVE | Facility: HOSPITAL | Age: 64
End: 2024-09-25
Payer: MEDICARE

## 2024-09-25 DIAGNOSIS — Z12.31 ENCOUNTER FOR SCREENING MAMMOGRAM FOR MALIGNANT NEOPLASM OF BREAST: Primary | ICD-10-CM

## 2024-10-29 ENCOUNTER — OFFICE VISIT (OUTPATIENT)
Dept: CARDIOLOGY | Facility: CLINIC | Age: 64
End: 2024-10-29
Payer: MEDICARE

## 2024-10-29 VITALS
HEART RATE: 66 BPM | BODY MASS INDEX: 15.39 KG/M2 | DIASTOLIC BLOOD PRESSURE: 69 MMHG | WEIGHT: 92.4 LBS | SYSTOLIC BLOOD PRESSURE: 100 MMHG | HEIGHT: 65 IN

## 2024-10-29 DIAGNOSIS — R00.2 PALPITATIONS: Primary | ICD-10-CM

## 2024-10-29 DIAGNOSIS — Z72.0 TOBACCO ABUSE: ICD-10-CM

## 2024-10-29 DIAGNOSIS — E78.2 MIXED HYPERLIPIDEMIA: ICD-10-CM

## 2024-10-29 PROCEDURE — 1160F RVW MEDS BY RX/DR IN RCRD: CPT

## 2024-10-29 PROCEDURE — 1159F MED LIST DOCD IN RCRD: CPT

## 2024-10-29 PROCEDURE — G2211 COMPLEX E/M VISIT ADD ON: HCPCS

## 2024-10-29 PROCEDURE — 99214 OFFICE O/P EST MOD 30 MIN: CPT

## 2024-10-29 NOTE — PROGRESS NOTES
"Chief Complaint  Follow-up, Coronary artery calcification, and Hyperlipidemia    Subjective        History of Present Illness  Ida Vincent presents to [facility] for follow up.   Patient is a 64-year-old female with past medical history outlined below, significant for hyperlipidemia and palpitations who presents for follow-up.  She continues to note episodes of dizziness and lightheadedness which have improved with lifestyle modifications.  She denies chest pain or discomfort, dyspnea, palpitations, edema or actual syncopal episodes.    Past Medical History:   Diagnosis Date    Abnormal ECG     Allergic     Anxiety 1999    Arthritis     Asthma     Cholelithiasis 1999    Congenital heart disease     COPD (chronic obstructive pulmonary disease)     Coronary artery disease     DDD (degenerative disc disease), lumbar     Depression 1999    Diverticulosis 2021    Fibromyalgia     Fibromyalgia, primary     GERD (gastroesophageal reflux disease)     Heart block     History of diverticulosis     History of palpitations     Hyperlipemia     Hypothyroidism     Low back pain     Lumbago     Osteopenia 2021    Pneumonia 1976    Premature ventricular contractions (PVCs) (VPCs)     HISTORY OF    Seasonal allergies     Slow to wake up after anesthesia     Substance abuse     Last drink Nov2019    Thyroid disorder        ALLERGY  Allergies   Allergen Reactions    Ciprofloxacin Anaphylaxis and Other (See Comments)    Thimerosal (Thiomersal) Swelling    Benadryl [Diphenhydramine] Other (See Comments)     \"It makes me climb the walls and shake\"    Prednisone Other (See Comments)     CHF  Pt stated solu-medrol is okay to take    Prednisolone Itching    Tetanus Immune Globulin Swelling    Thimerosal Swelling    Erythromycin Nausea And Vomiting        Past Surgical History:   Procedure Laterality Date    ABDOMINOPLASTY      ATRIAL SEPTAL DEFECT REPAIR      BREAST AUGMENTATION      BREAST IMPLANT SURGERY " Bilateral 10/18/2021    Procedure: BILATERAL REMOVAL OF IMPLANTS;  Surgeon: BRUCE Suresh MD;  Location: [facility] MAIN OR;  Service: Plastics;  Laterality: Bilateral;    BREAST SURGERY  10/18/21    CARDIAC CATHETERIZATION      CHOLECYSTECTOMY  1999    COLONOSCOPY  2011    COSMETIC SURGERY      Tummy tuck    DENTAL PROCEDURE      ENDOSCOPY  2011    GALLBLADDER SURGERY      HYSTERECTOMY      MASTOPEXY Bilateral 10/18/2021    Procedure: BILATERAL MASTOPEXY;  Surgeon: BRUCE Suresh MD;  Location: [facility] MAIN OR;  Service: Plastics;  Laterality: Bilateral;    OSTEOTOMY      OTHER SURGICAL HISTORY      metal implants     OTHER SURGICAL HISTORY      surgical clips RIGHT FACE ARTERY WITH JAW SURGERY    SEPTOPLASTY  2010    SHOULDER ARTHROSCOPIC ACROMIOCLAVICULAR (AC) JOINT RECONSTRUCTION Right     SINUS SURGERY      SPINE SURGERY  2021    TONSILLECTOMY      TOTAL ABDOMINAL HYSTERECTOMY WITH SALPINGO OOPHORECTOMY      TVH    VERTEBROPLASTY  06/04/2021    Lumbar 2        Social History     Socioeconomic History    Marital status:    Tobacco Use    Smoking status: Every Day     Current packs/day: 0.50     Average packs/day: 0.5 packs/day for 45.8 years (22.9 ttl pk-yrs)     Types: Cigarettes     Start date: 4/1/1976     Last attempt to quit: 4/1/2021    Smokeless tobacco: Never   Vaping Use    Vaping status: Every Day    Substances: Nicotine   Substance and Sexual Activity    Alcohol use: Not Currently    Drug use: Not Currently     Frequency: 7.0 times per week     Types: Marijuana     Comment: Patient states she uses marijuana every night-01/06/2022    Sexual activity: Not Currently     Partners: Male     Birth control/protection: Vasectomy, Hysterectomy       Family History   Problem Relation Age of Onset    Diabetes Mother     Hypertension Mother     Anxiety disorder Mother     Arthritis Mother     Alcohol abuse Mother     Thyroid disease Mother     Vision loss Mother     Hyperlipidemia Mother     Heart attack  Mother     Heart disease Mother     Cancer Father     Arthritis Father     Early death Father     Mental illness Sister     Arthritis Sister     Hyperlipidemia Sister     Early death Brother     Diabetes Brother     Arthritis Brother     Liver disease Brother     Heart disease Daughter         Cardiac ablation    Other Son         Gastro Problems    Malig Hyperthermia Neg Hx         Current Outpatient Medications on File Prior to Visit   Medication Sig    albuterol sulfate  (90 Base) MCG/ACT inhaler Inhale 2 puffs Every 4 (Four) Hours As Needed.    aspirin (ASPIRIN LOW DOSE) 81 MG EC tablet Take 1 tablet by mouth Daily.    Calcium Citrate-Vitamin D3 (CITRACAL) 315-6.25 MG-MCG tablet tablet 1 tablet Daily.    cetirizine (zyrTEC) 10 MG tablet Take 1 tablet by mouth Daily.    coenzyme Q10 100 MG capsule Take 1 capsule by mouth Daily.    Denosumab (PROLIA SC) Inject  under the skin into the appropriate area as directed Every 6 (Six) Months.    gabapentin (NEURONTIN) 300 MG capsule Take 1 capsule by mouth 2 (Two) Times a Day.    levothyroxine (SYNTHROID, LEVOTHROID) 50 MCG tablet Take 1 tablet by mouth Every Morning.    magnesium oxide (MAG-OX) 400 MG tablet Take 1 tablet by mouth Daily.    metoprolol succinate XL (TOPROL-XL) 25 MG 24 hr tablet Take 1 tablet by mouth 2 (Two) Times a Day.    metoprolol succinate XL (TOPROL-XL) 50 MG 24 hr tablet Take 1 tablet by mouth 2 (Two) Times a Day.    NON FORMULARY TUMERIC    ondansetron (ZOFRAN) 8 MG tablet Take 1 tablet by mouth Every 8 (Eight) Hours As Needed for Nausea or Vomiting.    pantoprazole (PROTONIX) 20 MG EC tablet Take 1 tablet by mouth Daily.    QUEtiapine Fumarate 150 MG tablet Take 100 mg by mouth Every Night.    rosuvastatin (CRESTOR) 40 MG tablet Take 1 tablet by mouth Every Night.    traZODone (DESYREL) 100 MG tablet Take 2 tablets by mouth Every Night.     No current facility-administered medications on file prior to visit.       Objective   Vitals:     "10/29/24 1351   BP: 100/69   Pulse: 66   Weight: 41.9 kg (92 lb 6.4 oz)   Height: 165.1 cm (65\")       Physical Exam  Constitutional:       General: She is awake. She is not in acute distress.     Appearance: Normal appearance.   HENT:      Head: Normocephalic.      Nose: Nose normal. No congestion.   Eyes:      Extraocular Movements: Extraocular movements intact.      Conjunctiva/sclera: Conjunctivae normal.      Pupils: Pupils are equal, round, and reactive to light.   Neck:      Thyroid: No thyromegaly.      Vascular: No JVD.   Cardiovascular:      Rate and Rhythm: Normal rate and regular rhythm.      Chest Wall: PMI is not displaced.      Pulses: Normal pulses.      Heart sounds: Normal heart sounds, S1 normal and S2 normal. No murmur heard.     No friction rub. No gallop. No S3 or S4 sounds.   Pulmonary:      Effort: Pulmonary effort is normal.      Breath sounds: Normal breath sounds. No wheezing, rhonchi or rales.   Abdominal:      General: Bowel sounds are normal.      Palpations: Abdomen is soft.      Tenderness: There is no abdominal tenderness.   Musculoskeletal:      Cervical back: No tenderness.      Right lower leg: No edema.      Left lower leg: No edema.   Lymphadenopathy:      Cervical: No cervical adenopathy.   Skin:     General: Skin is warm and dry.      Capillary Refill: Capillary refill takes less than 2 seconds.      Coloration: Skin is not cyanotic.      Findings: No petechiae or rash.      Nails: There is no clubbing.   Neurological:      Mental Status: She is alert.   Psychiatric:         Mood and Affect: Mood normal.         Behavior: Behavior is cooperative.           Result Review     The following data was reviewed by BRYANT Akers on 10/29/24.      CMP          3/13/2024    14:21 8/20/2024    16:04 9/24/2024    16:08   CMP   Glucose 83  78  94    BUN 7  7  7    Creatinine 0.69  0.81  0.73    EGFR 98.9  82.2  93.1    Sodium 138  136  136    Potassium 4.4  4.5  3.9    Chloride " 98  100  97    Calcium 9.1  9.7  9.4    Total Protein 7.1  6.6  7.2    Albumin 4.7  4.5  4.5    Globulin 2.4  2.1  2.7    Total Bilirubin 0.2  0.2  0.3    Alkaline Phosphatase 43  46  43    AST (SGOT) 16  13  15    ALT (SGPT) 9  8  8    Albumin/Globulin Ratio 2.0  2.1  1.7    BUN/Creatinine Ratio 10.1  8.6  9.6    Anion Gap 15.4  7.0  11.1      CBC w/diff          11/13/2023    15:04 8/20/2024    16:04   CBC w/Diff   WBC 6.82  6.69    RBC 4.63  4.53    Hemoglobin 14.0  13.3    Hematocrit 41.9  40.9    MCV 90.5  90.3    MCH 30.2  29.4    MCHC 33.4  32.5    RDW 12.2  12.7    Platelets 190  198    Neutrophil Rel % 46.7  47.6    Immature Granulocyte Rel % 0.4  0.6    Lymphocyte Rel % 44.3  43.0    Monocyte Rel % 6.0  6.3    Eosinophil Rel % 1.9  1.6    Basophil Rel % 0.7  0.9       Lipid Panel          11/13/2023    15:04 8/20/2024    16:04   Lipid Panel   Total Cholesterol 203  273    Triglycerides 138  127    HDL Cholesterol 66  55    VLDL Cholesterol 24  23    LDL Cholesterol  113  195    LDL/HDL Ratio 1.66  3.50            No results found for this or any previous visit.          Procedures      Assessment & Plan  Palpitations  Improved on metoprolol.  Continue the same.  She was having episodes of dizziness and lightheadedness which have also improved with lifestyle modifications.  She was advised to continue increasing her water and sodium intake.  Mixed hyperlipidemia  Continue statin therapy.  Tobacco abuse  Smoking cessation is advised.    Follow Up   Return in about 6 months (around 4/29/2025) for With Dr. Vang.    Patient was given instructions and counseling regarding her condition or for health maintenance advice. Please see specific information pulled into the AVS if appropriate.     Xochilt Kim, APRN  10/29/24  14:46 EDT    Dictated Utilizing Dragon Dictation

## 2024-10-30 ENCOUNTER — OFFICE VISIT (OUTPATIENT)
Dept: FAMILY MEDICINE CLINIC | Facility: CLINIC | Age: 64
End: 2024-10-30
Payer: MEDICARE

## 2024-10-30 VITALS
SYSTOLIC BLOOD PRESSURE: 88 MMHG | WEIGHT: 90 LBS | HEIGHT: 60 IN | OXYGEN SATURATION: 97 % | BODY MASS INDEX: 17.67 KG/M2 | HEART RATE: 65 BPM | TEMPERATURE: 97.4 F | DIASTOLIC BLOOD PRESSURE: 54 MMHG

## 2024-10-30 DIAGNOSIS — R42 DIZZINESS: ICD-10-CM

## 2024-10-30 DIAGNOSIS — E03.9 ACQUIRED HYPOTHYROIDISM: ICD-10-CM

## 2024-10-30 DIAGNOSIS — E78.5 HYPERLIPIDEMIA, UNSPECIFIED HYPERLIPIDEMIA TYPE: ICD-10-CM

## 2024-10-30 DIAGNOSIS — M25.511 CHRONIC RIGHT SHOULDER PAIN: ICD-10-CM

## 2024-10-30 DIAGNOSIS — Z09 FOLLOW-UP EXAM: Primary | ICD-10-CM

## 2024-10-30 DIAGNOSIS — G89.29 CHRONIC RIGHT SHOULDER PAIN: ICD-10-CM

## 2024-10-30 PROCEDURE — 1125F AMNT PAIN NOTED PAIN PRSNT: CPT | Performed by: NURSE PRACTITIONER

## 2024-10-30 PROCEDURE — 1159F MED LIST DOCD IN RCRD: CPT | Performed by: NURSE PRACTITIONER

## 2024-10-30 PROCEDURE — 1160F RVW MEDS BY RX/DR IN RCRD: CPT | Performed by: NURSE PRACTITIONER

## 2024-10-30 PROCEDURE — 99214 OFFICE O/P EST MOD 30 MIN: CPT | Performed by: NURSE PRACTITIONER

## 2024-10-30 NOTE — PROGRESS NOTES
Chief Complaint  Follow-up (2m F/U) and Shoulder Pain (Pt reports Chronic Right Shoulder pain, 6/10 p.s today. )    Subjective        Medical History: has a past medical history of Abnormal ECG, Allergic, Anxiety (1999), Arthritis, Asthma, Cholelithiasis (1999), Congenital heart disease, COPD (chronic obstructive pulmonary disease), Coronary artery disease, DDD (degenerative disc disease), lumbar, Depression (1999), Diverticulosis (2021), Fibromyalgia, Fibromyalgia, primary, GERD (gastroesophageal reflux disease), Heart block, History of diverticulosis, History of palpitations, Hyperlipemia, Hypothyroidism, Low back pain, Lumbago, Osteopenia (2021), Pneumonia (1976), Premature ventricular contractions (PVCs) (VPCs), Seasonal allergies, Slow to wake up after anesthesia, Substance abuse, and Thyroid disorder.     Surgical History: has a past surgical history that includes Vertebroplasty (06/04/2021); Tonsillectomy; Hysterectomy; shoulder arthroscopic acromioclavicular (ac) joint reconstruction (Right); Cardiac catheterization; Breast Augmentation; Osteotomy; ASD repair; Dental surgery; Colonoscopy (2011); Esophagogastroduodenoscopy (2011); Gallbladder surgery; Other surgical history; Other surgical history; Abdominoplasty; Mastopexy (Bilateral, 10/18/2021); Breast Implant Revision (Bilateral, 10/18/2021); Breast surgery (10/18/21); Cholecystectomy (1999); Cosmetic surgery; Septoplasty (2010); Spine surgery (2021); Total abdominal hysterectomy w/ bilateral salpingoophorectomy; and Sinus surgery.     Family History: family history includes Alcohol abuse in her mother; Anxiety disorder in her mother; Arthritis in her brother, father, mother, and sister; Cancer in her father; Diabetes in her brother and mother; Early death in her brother and father; Heart attack in her mother; Heart disease in her daughter and mother; Hyperlipidemia in her mother and sister; Hypertension in her mother; Liver disease in her brother; Mental  illness in her sister; Other in her son; Thyroid disease in her mother; Vision loss in her mother.     Social History: reports that she has been smoking cigarettes. She started smoking about 48 years ago. She has a 22.9 pack-year smoking history. She has never used smokeless tobacco. She reports that she does not currently use alcohol. She reports that she does not currently use drugs after having used the following drugs: Marijuana. Frequency: 7.00 times per week.    Ida Vincent presents to [facility]    Follow-up      History of Present Illness  The patient presents for evaluation of multiple medical concerns.    She reports experiencing frequent dizzy spells, which she can anticipate due to an aura. These episodes, typically lasting less than a minute, occur both when she is seated and standing. She has not had any falls since her kyphoplasty procedure. She also mentions occasional palpitations, particularly at the end of the day, which she attributes to stress. Despite her low blood pressure, she has been advised to increase her water and salt intake.  She is currently being followed by cardiology    She has not gained weight recently, with her current weight being 90 pounds, down from 93 pounds previously. She is concerned about potential malnutrition and has a DEXA scan scheduled for 02/2025. She has a history of high cholesterol and is currently on medication for it.    She has declined the influenza and pneumonia vaccines. She has had two COVID-19 infections and has received two doses of the vaccine but has declined the booster. She continues to consume caffeine, as attempts to switch to decaf result in severe headaches.    She has been dealing with shoulder pain and has received two injections, which provided only temporary relief. She is currently taking gabapentin for this issue.    FAMILY HISTORY  Her mother was diagnosed with metastatic cancer.      Objective  "  Vital Signs:   BP (!) 88/54 (BP Location: Right arm, Patient Position: Sitting, Cuff Size: Adult)   Pulse 65   Temp 97.4 °F (36.3 °C) (Infrared)   Ht 152.4 cm (60\")   Wt 40.8 kg (90 lb)   SpO2 97%   BMI 17.58 kg/m²       Wt Readings from Last 3 Encounters:   10/30/24 40.8 kg (90 lb)   10/29/24 41.9 kg (92 lb 6.4 oz)   09/24/24 42.6 kg (93 lb 14.7 oz)        BP Readings from Last 3 Encounters:   10/30/24 (!) 88/54   10/29/24 100/69   09/24/24 113/72                Physical Exam  Vitals reviewed.   Constitutional:       General: She is not in acute distress.     Appearance: Normal appearance. She is well-developed and underweight. She is not ill-appearing.   HENT:      Head: Normocephalic and atraumatic.   Eyes:      Conjunctiva/sclera: Conjunctivae normal.      Pupils: Pupils are equal, round, and reactive to light.   Cardiovascular:      Rate and Rhythm: Normal rate and regular rhythm.      Heart sounds: No murmur heard.  Pulmonary:      Effort: Pulmonary effort is normal.      Breath sounds: Normal breath sounds. No wheezing.   Skin:     General: Skin is warm and dry.   Neurological:      Mental Status: She is alert and oriented to person, place, and time.   Psychiatric:         Mood and Affect: Mood and affect normal.         Behavior: Behavior normal.         Thought Content: Thought content normal.         Judgment: Judgment normal.        Result Review :  {The following data was reviewed by BRYANT Ruelas on 10/30/2024.  Common labs          3/13/2024    14:21 8/20/2024    16:04 9/24/2024    16:08   Common Labs   Glucose 83  78  94    BUN 7  7  7    Creatinine 0.69  0.81  0.73    Sodium 138  136  136    Potassium 4.4  4.5  3.9    Chloride 98  100  97    Calcium 9.1  9.7  9.4    Albumin 4.7  4.5  4.5    Total Bilirubin 0.2  0.2  0.3    Alkaline Phosphatase 43  46  43    AST (SGOT) 16  13  15    ALT (SGPT) 9  8  8    WBC  6.69     Hemoglobin  13.3     Hematocrit  40.9     Platelets  198   "   Total Cholesterol  273     Triglycerides  127     HDL Cholesterol  55     LDL Cholesterol   195       Data reviewed : previous office note             Current Outpatient Medications on File Prior to Visit   Medication Sig Dispense Refill    albuterol sulfate  (90 Base) MCG/ACT inhaler Inhale 2 puffs Every 4 (Four) Hours As Needed.      aspirin (ASPIRIN LOW DOSE) 81 MG EC tablet Take 1 tablet by mouth Daily. 90 tablet 3    Calcium Citrate-Vitamin D3 (CITRACAL) 315-6.25 MG-MCG tablet tablet 1 tablet Daily.      cetirizine (zyrTEC) 10 MG tablet Take 1 tablet by mouth Daily. 90 tablet 3    coenzyme Q10 100 MG capsule Take 1 capsule by mouth Daily.      Denosumab (PROLIA SC) Inject  under the skin into the appropriate area as directed Every 6 (Six) Months.      gabapentin (NEURONTIN) 300 MG capsule Take 1 capsule by mouth 2 (Two) Times a Day. 180 capsule 1    levothyroxine (SYNTHROID, LEVOTHROID) 50 MCG tablet Take 1 tablet by mouth Every Morning. 90 tablet 1    magnesium oxide (MAG-OX) 400 MG tablet Take 1 tablet by mouth Daily.      metoprolol succinate XL (TOPROL-XL) 25 MG 24 hr tablet Take 1 tablet by mouth 2 (Two) Times a Day. 180 tablet 1    metoprolol succinate XL (TOPROL-XL) 50 MG 24 hr tablet Take 1 tablet by mouth 2 (Two) Times a Day. 180 tablet 1    NON FORMULARY TUMERIC      ondansetron (ZOFRAN) 8 MG tablet Take 1 tablet by mouth Every 8 (Eight) Hours As Needed for Nausea or Vomiting. 30 tablet 5    pantoprazole (PROTONIX) 20 MG EC tablet Take 1 tablet by mouth Daily. 90 tablet 1    QUEtiapine Fumarate 150 MG tablet Take 100 mg by mouth Every Night.      rosuvastatin (CRESTOR) 40 MG tablet Take 1 tablet by mouth Every Night. 90 tablet 1    traZODone (DESYREL) 100 MG tablet Take 2 tablets by mouth Every Night.       No current facility-administered medications on file prior to visit.      Last Urine Toxicity  More data may exist         Latest Ref Rng & Units 8/30/2024 5/9/2023   LAST URINE TOXICITY  RESULTS   Amphetamine, Urine Qual Negative Negative  Negative    Barbiturates Screen, Urine Negative Negative  Negative    Benzodiazepine Screen, Urine Negative Negative  Negative    Buprenorphine, Screen, Urine Negative Negative  Negative    Cocaine Screen, Urine Negative Negative  Negative    Methadone Screen , Urine Negative Negative  Negative    Methamphetamine, Ur Negative Negative  Negative       Details                    Assessment and Plan  Diagnoses and all orders for this visit:    1. Follow-up exam (Primary)    2. Chronic right shoulder pain    3. Dizziness    4. Acquired hypothyroidism  -     TSH Rfx On Abnormal To Free T4; Future    5. Hyperlipidemia, unspecified hyperlipidemia type  -     CBC & Differential; Future  -     Comprehensive Metabolic Panel; Future  -     Lipid Panel; Future        Assessment & Plan  1. Dizziness.  She reports experiencing frequent dizzy spells, particularly when standing up, which last less than a minute. Her protein, kidney function, and anion gap are within normal limits. Vitamin D levels were checked in August 2024 and were satisfactory. Magnesium and phosphorus levels were assessed in March 2024 and were also within normal range. No additional labs are deemed necessary at this time. She is advised to monitor her palpitations, increase her water intake, and consume more salt as recommended by her cardiologist.    2. Shoulder pain.  She has received two injections for her shoulder pain, which provided only temporary relief. Due to her current caregiving responsibilities for her mother, she is unable to pursue further interventions at this time. She is advised to manage the pain as best as she can and avoid activities that may exacerbate the condition.    3. Medication Management.  Her gabapentin was refilled in August 2024 for a 6-month supply, so she should have three more months remaining. She is advised to check her medication supply and contact the office if she  needs a refill sooner.        Follow Up   Return in about 3 months (around 1/30/2025) for Recheck.  Patient was given instructions and counseling regarding her condition or for health maintenance advice. Please see specific information pulled into the AVS if appropriate.       Part of this note may be electronic transcription/translation of spoken language to printed text using the Dragon dictation system    Patient or patient representative verbalized consent for the use of Ambient Listening during the visit with  BRYANT Ruelas for chart documentation. 10/30/2024  14:44 EDT

## 2024-11-15 RX ORDER — METOPROLOL SUCCINATE 25 MG/1
25 TABLET, EXTENDED RELEASE ORAL 2 TIMES DAILY
Qty: 180 TABLET | Refills: 1 | Status: SHIPPED | OUTPATIENT
Start: 2024-11-15

## 2024-11-15 NOTE — TELEPHONE ENCOUNTER
Rx Refill Note  Requested Prescriptions     Pending Prescriptions Disp Refills    metoprolol succinate XL (TOPROL-XL) 25 MG 24 hr tablet 180 tablet 1     Sig: Take 1 tablet by mouth 2 (Two) Times a Day.      Last office visit with prescribing clinician: 10/30/2024   Last telemedicine visit with prescribing clinician: Visit date not found   Next office visit with prescribing clinician: 1/30/2025                         Would you like a call back once the refill request has been completed: [] Yes [] No    If the office needs to give you a call back, can they leave a voicemail: [] Yes [] No    Earlene Rivas Rep  11/15/24, 13:59 EST

## 2024-11-26 ENCOUNTER — HOSPITAL ENCOUNTER (OUTPATIENT)
Dept: MAMMOGRAPHY | Facility: HOSPITAL | Age: 64
Discharge: HOME | End: 2024-11-26
Admitting: NURSE PRACTITIONER
Payer: MEDICARE

## 2024-11-26 DIAGNOSIS — Z12.31 ENCOUNTER FOR SCREENING MAMMOGRAM FOR MALIGNANT NEOPLASM OF BREAST: ICD-10-CM

## 2024-11-26 PROCEDURE — 77067 SCR MAMMO BI INCL CAD: CPT

## 2024-11-26 PROCEDURE — 77063 BREAST TOMOSYNTHESIS BI: CPT

## 2025-01-02 RX ORDER — METOPROLOL SUCCINATE 50 MG/1
50 TABLET, EXTENDED RELEASE ORAL 2 TIMES DAILY
Qty: 180 TABLET | Refills: 1 | Status: SHIPPED | OUTPATIENT
Start: 2025-01-02

## 2025-01-02 RX ORDER — ROSUVASTATIN CALCIUM 40 MG/1
40 TABLET, COATED ORAL NIGHTLY
Qty: 90 TABLET | Refills: 1 | Status: SHIPPED | OUTPATIENT
Start: 2025-01-02

## 2025-01-02 RX ORDER — PANTOPRAZOLE SODIUM 20 MG/1
20 TABLET, DELAYED RELEASE ORAL DAILY
Qty: 90 TABLET | Refills: 1 | Status: SHIPPED | OUTPATIENT
Start: 2025-01-02

## 2025-01-02 NOTE — TELEPHONE ENCOUNTER
Caller: Ida Vincent    Relationship: Self    Best call back number:   Telephone Information:   Mobile [phone]         Requested Prescriptions:   Requested Prescriptions     Pending Prescriptions Disp Refills    metoprolol succinate XL (TOPROL-XL) 50 MG 24 hr tablet 180 tablet 1     Sig: Take 1 tablet by mouth 2 (Two) Times a Day.    rosuvastatin (CRESTOR) 40 MG tablet 90 tablet 1     Sig: Take 1 tablet by mouth Every Night.    pantoprazole (PROTONIX) 20 MG EC tablet 90 tablet 1     Sig: Take 1 tablet by mouth Daily.        Pharmacy where request should be sent: [place] [phone] [place] [phone]      Last office visit with prescribing clinician: 10/30/2024   Last telemedicine visit with prescribing clinician: Visit date not found   Next office visit with prescribing clinician: 1/30/2025     Additional details provided by patient:        THE PATIENT IS OUT OF THIS MEDICATION        Does the patient have less than a 3 day supply:  [x] Yes  [] No    Would you like a call back once the refill request has been completed: [] Yes [x] No    If the office needs to give you a call back, can they leave a voicemail: [] Yes [] No    Earlene Jacinto Rep   01/02/25 13:22 EST         DELETE AFTER READING TO PATIENT: “Thank you for sharing this information with me. I will send a message to the clinical team. Please allow 48 hours for the clinical staff to follow up on this request.”

## 2025-01-28 ENCOUNTER — CLINICAL SUPPORT (OUTPATIENT)
Dept: FAMILY MEDICINE CLINIC | Facility: CLINIC | Age: 65
End: 2025-01-28
Payer: MEDICARE

## 2025-01-28 DIAGNOSIS — E78.5 HYPERLIPIDEMIA, UNSPECIFIED HYPERLIPIDEMIA TYPE: ICD-10-CM

## 2025-01-28 DIAGNOSIS — E03.9 ACQUIRED HYPOTHYROIDISM: ICD-10-CM

## 2025-01-28 LAB
ALBUMIN SERPL-MCNC: 4.5 G/DL (ref 3.5–5.2)
ALBUMIN/GLOB SERPL: 2 G/DL
ALP SERPL-CCNC: 39 U/L (ref 39–117)
ALT SERPL W P-5'-P-CCNC: 11 U/L (ref 1–33)
ANION GAP SERPL CALCULATED.3IONS-SCNC: 8.8 MMOL/L (ref 5–15)
AST SERPL-CCNC: 22 U/L (ref 1–32)
BASOPHILS # BLD AUTO: 0.04 10*3/MM3 (ref 0–0.2)
BASOPHILS NFR BLD AUTO: 0.8 % (ref 0–1.5)
BILIRUB SERPL-MCNC: 0.3 MG/DL (ref 0–1.2)
BUN SERPL-MCNC: 10 MG/DL (ref 8–23)
BUN/CREAT SERPL: 13.7 (ref 7–25)
CALCIUM SPEC-SCNC: 9.5 MG/DL (ref 8.6–10.5)
CHLORIDE SERPL-SCNC: 101 MMOL/L (ref 98–107)
CHOLEST SERPL-MCNC: 158 MG/DL (ref 0–200)
CO2 SERPL-SCNC: 28.2 MMOL/L (ref 22–29)
CREAT SERPL-MCNC: 0.73 MG/DL (ref 0.57–1)
DEPRECATED RDW RBC AUTO: 43.8 FL (ref 37–54)
EGFRCR SERPLBLD CKD-EPI 2021: 93.1 ML/MIN/1.73
EOSINOPHIL # BLD AUTO: 0.1 10*3/MM3 (ref 0–0.4)
EOSINOPHIL NFR BLD AUTO: 1.9 % (ref 0.3–6.2)
ERYTHROCYTE [DISTWIDTH] IN BLOOD BY AUTOMATED COUNT: 12.5 % (ref 12.3–15.4)
GLOBULIN UR ELPH-MCNC: 2.3 GM/DL
GLUCOSE SERPL-MCNC: 83 MG/DL (ref 65–99)
HCT VFR BLD AUTO: 42.2 % (ref 34–46.6)
HDLC SERPL-MCNC: 58 MG/DL (ref 40–60)
HGB BLD-MCNC: 12.8 G/DL (ref 12–15.9)
IMM GRANULOCYTES # BLD AUTO: 0.01 10*3/MM3 (ref 0–0.05)
IMM GRANULOCYTES NFR BLD AUTO: 0.2 % (ref 0–0.5)
LDLC SERPL CALC-MCNC: 84 MG/DL (ref 0–100)
LDLC/HDLC SERPL: 1.43 {RATIO}
LYMPHOCYTES # BLD AUTO: 2.4 10*3/MM3 (ref 0.7–3.1)
LYMPHOCYTES NFR BLD AUTO: 45.6 % (ref 19.6–45.3)
MCH RBC QN AUTO: 28.8 PG (ref 26.6–33)
MCHC RBC AUTO-ENTMCNC: 30.3 G/DL (ref 31.5–35.7)
MCV RBC AUTO: 94.8 FL (ref 79–97)
MONOCYTES # BLD AUTO: 0.45 10*3/MM3 (ref 0.1–0.9)
MONOCYTES NFR BLD AUTO: 8.6 % (ref 5–12)
NEUTROPHILS NFR BLD AUTO: 2.26 10*3/MM3 (ref 1.7–7)
NEUTROPHILS NFR BLD AUTO: 42.9 % (ref 42.7–76)
NRBC BLD AUTO-RTO: 0 /100 WBC (ref 0–0.2)
PLATELET # BLD AUTO: 188 10*3/MM3 (ref 140–450)
PMV BLD AUTO: 11.4 FL (ref 6–12)
POTASSIUM SERPL-SCNC: 4.7 MMOL/L (ref 3.5–5.2)
PROT SERPL-MCNC: 6.8 G/DL (ref 6–8.5)
RBC # BLD AUTO: 4.45 10*6/MM3 (ref 3.77–5.28)
SODIUM SERPL-SCNC: 138 MMOL/L (ref 136–145)
TRIGL SERPL-MCNC: 85 MG/DL (ref 0–150)
TSH SERPL DL<=0.05 MIU/L-ACNC: 3.12 UIU/ML (ref 0.27–4.2)
VLDLC SERPL-MCNC: 16 MG/DL (ref 5–40)
WBC NRBC COR # BLD AUTO: 5.26 10*3/MM3 (ref 3.4–10.8)

## 2025-01-28 PROCEDURE — 36415 COLL VENOUS BLD VENIPUNCTURE: CPT | Performed by: NURSE PRACTITIONER

## 2025-01-28 PROCEDURE — 80053 COMPREHEN METABOLIC PANEL: CPT | Performed by: NURSE PRACTITIONER

## 2025-01-28 PROCEDURE — 85025 COMPLETE CBC W/AUTO DIFF WBC: CPT | Performed by: NURSE PRACTITIONER

## 2025-01-28 PROCEDURE — 84443 ASSAY THYROID STIM HORMONE: CPT | Performed by: NURSE PRACTITIONER

## 2025-01-28 PROCEDURE — 80061 LIPID PANEL: CPT | Performed by: NURSE PRACTITIONER

## 2025-01-30 ENCOUNTER — OFFICE VISIT (OUTPATIENT)
Dept: FAMILY MEDICINE CLINIC | Facility: CLINIC | Age: 65
End: 2025-01-30
Payer: MEDICARE

## 2025-01-30 VITALS
SYSTOLIC BLOOD PRESSURE: 98 MMHG | DIASTOLIC BLOOD PRESSURE: 62 MMHG | OXYGEN SATURATION: 98 % | HEIGHT: 60 IN | TEMPERATURE: 98.2 F | WEIGHT: 81.3 LBS | BODY MASS INDEX: 15.96 KG/M2 | HEART RATE: 68 BPM

## 2025-01-30 DIAGNOSIS — R19.7 DIARRHEA, UNSPECIFIED TYPE: ICD-10-CM

## 2025-01-30 DIAGNOSIS — M79.7 FIBROMYALGIA: ICD-10-CM

## 2025-01-30 DIAGNOSIS — Z09 FOLLOW-UP EXAM: Primary | ICD-10-CM

## 2025-01-30 DIAGNOSIS — E78.5 HYPERLIPIDEMIA, UNSPECIFIED HYPERLIPIDEMIA TYPE: ICD-10-CM

## 2025-01-30 DIAGNOSIS — J45.909 ASTHMA, UNSPECIFIED ASTHMA SEVERITY, UNSPECIFIED WHETHER COMPLICATED, UNSPECIFIED WHETHER PERSISTENT: ICD-10-CM

## 2025-01-30 DIAGNOSIS — E78.2 MIXED HYPERLIPIDEMIA: ICD-10-CM

## 2025-01-30 DIAGNOSIS — K21.00 GASTROESOPHAGEAL REFLUX DISEASE WITH ESOPHAGITIS WITHOUT HEMORRHAGE: ICD-10-CM

## 2025-01-30 DIAGNOSIS — R63.4 WEIGHT LOSS, UNINTENTIONAL: ICD-10-CM

## 2025-01-30 DIAGNOSIS — R42 DIZZINESS: ICD-10-CM

## 2025-01-30 DIAGNOSIS — R11.0 NAUSEA: ICD-10-CM

## 2025-01-30 DIAGNOSIS — E03.9 ACQUIRED HYPOTHYROIDISM: ICD-10-CM

## 2025-01-30 DIAGNOSIS — G62.9 NEUROPATHY: ICD-10-CM

## 2025-01-30 PROCEDURE — G2211 COMPLEX E/M VISIT ADD ON: HCPCS | Performed by: NURSE PRACTITIONER

## 2025-01-30 PROCEDURE — 1160F RVW MEDS BY RX/DR IN RCRD: CPT | Performed by: NURSE PRACTITIONER

## 2025-01-30 PROCEDURE — 1125F AMNT PAIN NOTED PAIN PRSNT: CPT | Performed by: NURSE PRACTITIONER

## 2025-01-30 PROCEDURE — 99214 OFFICE O/P EST MOD 30 MIN: CPT | Performed by: NURSE PRACTITIONER

## 2025-01-30 PROCEDURE — 1159F MED LIST DOCD IN RCRD: CPT | Performed by: NURSE PRACTITIONER

## 2025-01-30 RX ORDER — ASPIRIN 81 MG/1
81 TABLET ORAL DAILY
Qty: 90 TABLET | Refills: 3 | Status: SHIPPED | OUTPATIENT
Start: 2025-01-30

## 2025-01-30 RX ORDER — UBIDECARENONE 100 MG
100 CAPSULE ORAL DAILY
Qty: 90 CAPSULE | Refills: 1 | Status: SHIPPED | OUTPATIENT
Start: 2025-01-30

## 2025-01-30 RX ORDER — METOPROLOL SUCCINATE 50 MG/1
50 TABLET, EXTENDED RELEASE ORAL 2 TIMES DAILY
Qty: 180 TABLET | Refills: 1 | Status: SHIPPED | OUTPATIENT
Start: 2025-01-30

## 2025-01-30 RX ORDER — FLUTICASONE FUROATE, UMECLIDINIUM BROMIDE AND VILANTEROL TRIFENATATE 100; 62.5; 25 UG/1; UG/1; UG/1
1 POWDER RESPIRATORY (INHALATION)
Qty: 60 EACH | Refills: 2 | Status: SHIPPED | OUTPATIENT
Start: 2025-01-30

## 2025-01-30 RX ORDER — QUETIAPINE FUMARATE 300 MG/1
150 TABLET, FILM COATED ORAL NIGHTLY
Qty: 45 TABLET | Refills: 3 | Status: SHIPPED | OUTPATIENT
Start: 2025-01-30

## 2025-01-30 RX ORDER — METOPROLOL SUCCINATE 25 MG/1
25 TABLET, EXTENDED RELEASE ORAL 2 TIMES DAILY
Qty: 180 TABLET | Refills: 1 | Status: SHIPPED | OUTPATIENT
Start: 2025-01-30

## 2025-01-30 RX ORDER — HYDROXYZINE HYDROCHLORIDE 10 MG/5ML
4 SYRUP ORAL EVERY 6 HOURS PRN
Qty: 60 TABLET | Refills: 1 | Status: SHIPPED | OUTPATIENT
Start: 2025-01-30

## 2025-01-30 RX ORDER — CETIRIZINE HYDROCHLORIDE 10 MG/1
10 TABLET ORAL DAILY
Qty: 90 TABLET | Refills: 3 | Status: SHIPPED | OUTPATIENT
Start: 2025-01-30

## 2025-01-30 RX ORDER — ONDANSETRON 8 MG/1
8 TABLET, FILM COATED ORAL EVERY 8 HOURS PRN
Qty: 30 TABLET | Refills: 5 | Status: SHIPPED | OUTPATIENT
Start: 2025-01-30

## 2025-01-30 RX ORDER — PANTOPRAZOLE SODIUM 20 MG/1
20 TABLET, DELAYED RELEASE ORAL DAILY
Qty: 90 TABLET | Refills: 1 | Status: SHIPPED | OUTPATIENT
Start: 2025-01-30

## 2025-01-30 RX ORDER — ALBUTEROL SULFATE 90 UG/1
2 INHALANT RESPIRATORY (INHALATION) EVERY 4 HOURS PRN
Qty: 8 G | Refills: 3 | Status: SHIPPED | OUTPATIENT
Start: 2025-01-30

## 2025-01-30 RX ORDER — MAGNESIUM OXIDE 400 MG/1
400 TABLET ORAL DAILY
Qty: 90 TABLET | Refills: 3 | Status: SHIPPED | OUTPATIENT
Start: 2025-01-30

## 2025-01-30 RX ORDER — LEVOTHYROXINE SODIUM 50 UG/1
50 TABLET ORAL
Qty: 90 TABLET | Refills: 1 | Status: SHIPPED | OUTPATIENT
Start: 2025-01-30

## 2025-01-30 RX ORDER — GABAPENTIN 300 MG/1
300 CAPSULE ORAL 2 TIMES DAILY
Qty: 180 CAPSULE | Refills: 1 | Status: SHIPPED | OUTPATIENT
Start: 2025-01-30

## 2025-01-30 RX ORDER — FLUTICASONE FUROATE, UMECLIDINIUM BROMIDE AND VILANTEROL TRIFENATATE 100; 62.5; 25 UG/1; UG/1; UG/1
1 POWDER RESPIRATORY (INHALATION)
COMMUNITY
End: 2025-01-30 | Stop reason: SDUPTHER

## 2025-01-30 RX ORDER — TRAZODONE HYDROCHLORIDE 100 MG/1
200 TABLET ORAL NIGHTLY
Qty: 90 TABLET | Refills: 2 | Status: SHIPPED | OUTPATIENT
Start: 2025-01-30

## 2025-01-30 RX ORDER — ROSUVASTATIN CALCIUM 40 MG/1
40 TABLET, COATED ORAL NIGHTLY
Qty: 90 TABLET | Refills: 1 | Status: SHIPPED | OUTPATIENT
Start: 2025-01-30
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
## 2025-02-03 ENCOUNTER — HOSPITAL ENCOUNTER (OUTPATIENT)
Dept: BONE DENSITY | Facility: HOSPITAL | Age: 65
Discharge: HOME | End: 2025-02-03
Payer: MEDICARE

## 2025-02-03 ENCOUNTER — LAB (OUTPATIENT)
Facility: HOSPITAL | Age: 65
End: 2025-02-03
Payer: MEDICARE

## 2025-02-03 DIAGNOSIS — M81.0 AGE-RELATED OSTEOPOROSIS WITHOUT CURRENT PATHOLOGICAL FRACTURE: ICD-10-CM

## 2025-02-03 LAB — ALPHA-FETOPROTEIN: 3.72 NG/ML (ref 0–8.3)

## 2025-02-03 PROCEDURE — 77080 DXA BONE DENSITY AXIAL: CPT

## 2025-02-03 PROCEDURE — 82105 ALPHA-FETOPROTEIN SERUM: CPT | Performed by: NURSE PRACTITIONER

## 2025-02-03 PROCEDURE — 87505 NFCT AGENT DETECTION GI: CPT | Performed by: NURSE PRACTITIONER

## 2025-02-04 LAB
CRYPTOSP DNA STL QL NAA+NON-PROBE: NOT DETECTED
E HISTOLYT DNA STL QL NAA+NON-PROBE: NOT DETECTED
G LAMBLIA DNA STL QL NAA+NON-PROBE: NOT DETECTED

## 2025-02-06 DIAGNOSIS — M81.0 AGE-RELATED OSTEOPOROSIS WITHOUT CURRENT PATHOLOGICAL FRACTURE: Primary | ICD-10-CM

## 2025-03-19 ENCOUNTER — CLINICAL SUPPORT (OUTPATIENT)
Dept: FAMILY MEDICINE CLINIC | Facility: CLINIC | Age: 65
End: 2025-03-19
Payer: MEDICARE

## 2025-03-19 DIAGNOSIS — M81.0 AGE-RELATED OSTEOPOROSIS WITHOUT CURRENT PATHOLOGICAL FRACTURE: ICD-10-CM

## 2025-03-19 LAB
25(OH)D3 SERPL-MCNC: 65.9 NG/ML (ref 30–100)
ALBUMIN SERPL-MCNC: 4.7 G/DL (ref 3.5–5.2)
ALBUMIN/GLOB SERPL: 1.7 G/DL
ALP SERPL-CCNC: 46 U/L (ref 39–117)
ALT SERPL W P-5'-P-CCNC: 193 U/L (ref 1–33)
ANION GAP SERPL CALCULATED.3IONS-SCNC: 11 MMOL/L (ref 5–15)
AST SERPL-CCNC: 131 U/L (ref 1–32)
BILIRUB SERPL-MCNC: 0.3 MG/DL (ref 0–1.2)
BUN SERPL-MCNC: 11 MG/DL (ref 8–23)
BUN/CREAT SERPL: 15.1 (ref 7–25)
CALCIUM SPEC-SCNC: 9.3 MG/DL (ref 8.6–10.5)
CHLORIDE SERPL-SCNC: 99 MMOL/L (ref 98–107)
CO2 SERPL-SCNC: 26 MMOL/L (ref 22–29)
CREAT SERPL-MCNC: 0.73 MG/DL (ref 0.57–1)
EGFRCR SERPLBLD CKD-EPI 2021: 93.1 ML/MIN/1.73
GLOBULIN UR ELPH-MCNC: 2.7 GM/DL
GLUCOSE SERPL-MCNC: 87 MG/DL (ref 65–99)
MAGNESIUM SERPL-MCNC: 2.4 MG/DL (ref 1.6–2.4)
PHOSPHATE SERPL-MCNC: 3.8 MG/DL (ref 2.5–4.5)
POTASSIUM SERPL-SCNC: 4.2 MMOL/L (ref 3.5–5.2)
PROT SERPL-MCNC: 7.4 G/DL (ref 6–8.5)
SODIUM SERPL-SCNC: 136 MMOL/L (ref 136–145)

## 2025-03-19 PROCEDURE — 82306 VITAMIN D 25 HYDROXY: CPT | Performed by: NURSE PRACTITIONER

## 2025-03-19 PROCEDURE — 83735 ASSAY OF MAGNESIUM: CPT | Performed by: NURSE PRACTITIONER

## 2025-03-19 PROCEDURE — 84100 ASSAY OF PHOSPHORUS: CPT | Performed by: NURSE PRACTITIONER

## 2025-03-19 PROCEDURE — 36415 COLL VENOUS BLD VENIPUNCTURE: CPT | Performed by: NURSE PRACTITIONER

## 2025-03-19 PROCEDURE — 80053 COMPREHEN METABOLIC PANEL: CPT | Performed by: NURSE PRACTITIONER

## 2025-03-25 ENCOUNTER — HOSPITAL ENCOUNTER (OUTPATIENT)
Dept: INFUSION THERAPY | Facility: HOSPITAL | Age: 65
Discharge: HOME | End: 2025-03-25
Admitting: NURSE PRACTITIONER
Payer: MEDICARE

## 2025-03-25 ENCOUNTER — CLINICAL SUPPORT (OUTPATIENT)
Dept: FAMILY MEDICINE CLINIC | Facility: CLINIC | Age: 65
End: 2025-03-25
Payer: MEDICARE

## 2025-03-25 VITALS
TEMPERATURE: 97.6 F | DIASTOLIC BLOOD PRESSURE: 57 MMHG | WEIGHT: 87.52 LBS | SYSTOLIC BLOOD PRESSURE: 101 MMHG | RESPIRATION RATE: 20 BRPM | HEART RATE: 61 BPM | OXYGEN SATURATION: 96 % | HEIGHT: 65 IN | BODY MASS INDEX: 14.58 KG/M2

## 2025-03-25 DIAGNOSIS — R74.8 ELEVATED LIVER ENZYMES: ICD-10-CM

## 2025-03-25 DIAGNOSIS — M81.0 AGE-RELATED OSTEOPOROSIS WITHOUT CURRENT PATHOLOGICAL FRACTURE: ICD-10-CM

## 2025-03-25 DIAGNOSIS — M81.0 AGE-RELATED OSTEOPOROSIS WITHOUT CURRENT PATHOLOGICAL FRACTURE: Primary | ICD-10-CM

## 2025-03-25 DIAGNOSIS — K75.9 INFLAMMATORY LIVER DISEASE, UNSPECIFIED: ICD-10-CM

## 2025-03-25 LAB
25(OH)D3 SERPL-MCNC: 62.1 NG/ML (ref 30–100)
ALBUMIN SERPL-MCNC: 4.8 G/DL (ref 3.5–5.2)
ALBUMIN/GLOB SERPL: 1.8 G/DL
ALP SERPL-CCNC: 48 U/L (ref 39–117)
ALT SERPL W P-5'-P-CCNC: 131 U/L (ref 1–33)
ANION GAP SERPL CALCULATED.3IONS-SCNC: 11.9 MMOL/L (ref 5–15)
AST SERPL-CCNC: 70 U/L (ref 1–32)
BILIRUB CONJ SERPL-MCNC: 0.1 MG/DL (ref 0–0.3)
BILIRUB SERPL-MCNC: 0.4 MG/DL (ref 0–1.2)
BUN SERPL-MCNC: 7 MG/DL (ref 8–23)
BUN/CREAT SERPL: 9.2 (ref 7–25)
CALCIUM SPEC-SCNC: 10.1 MG/DL (ref 8.6–10.5)
CHLORIDE SERPL-SCNC: 97 MMOL/L (ref 98–107)
CO2 SERPL-SCNC: 27.1 MMOL/L (ref 22–29)
CREAT SERPL-MCNC: 0.76 MG/DL (ref 0.57–1)
EGFRCR SERPLBLD CKD-EPI 2021: 88.7 ML/MIN/1.73
GLOBULIN UR ELPH-MCNC: 2.7 GM/DL
GLUCOSE SERPL-MCNC: 94 MG/DL (ref 65–99)
HAV IGM SERPL QL IA: NORMAL
HBV CORE IGM SERPL QL IA: NORMAL
HBV SURFACE AG SERPL QL IA: NORMAL
HCV AB SER QL: NORMAL
MAGNESIUM SERPL-MCNC: 2.1 MG/DL (ref 1.6–2.4)
PHOSPHATE SERPL-MCNC: 4.8 MG/DL (ref 2.5–4.5)
POTASSIUM SERPL-SCNC: 4.7 MMOL/L (ref 3.5–5.2)
PROT SERPL-MCNC: 7.5 G/DL (ref 6–8.5)
SODIUM SERPL-SCNC: 136 MMOL/L (ref 136–145)

## 2025-03-25 PROCEDURE — 84100 ASSAY OF PHOSPHORUS: CPT | Performed by: NURSE PRACTITIONER

## 2025-03-25 PROCEDURE — 80074 ACUTE HEPATITIS PANEL: CPT | Performed by: NURSE PRACTITIONER

## 2025-03-25 PROCEDURE — 80053 COMPREHEN METABOLIC PANEL: CPT | Performed by: NURSE PRACTITIONER

## 2025-03-25 PROCEDURE — 96372 THER/PROPH/DIAG INJ SC/IM: CPT

## 2025-03-25 PROCEDURE — 82248 BILIRUBIN DIRECT: CPT | Performed by: NURSE PRACTITIONER

## 2025-03-25 PROCEDURE — 25010000002 DENOSUMAB 60 MG/ML SOLUTION PREFILLED SYRINGE: Performed by: NURSE PRACTITIONER

## 2025-03-25 PROCEDURE — 83735 ASSAY OF MAGNESIUM: CPT | Performed by: NURSE PRACTITIONER

## 2025-03-25 PROCEDURE — 82306 VITAMIN D 25 HYDROXY: CPT | Performed by: NURSE PRACTITIONER

## 2025-03-25 RX ADMIN — DENOSUMAB 60 MG: 60 INJECTION SUBCUTANEOUS at 13:01

## 2025-04-24 ENCOUNTER — OFFICE VISIT (OUTPATIENT)
Dept: ORTHOPEDIC SURGERY | Facility: CLINIC | Age: 65
End: 2025-04-24
Payer: MEDICARE

## 2025-04-24 VITALS
SYSTOLIC BLOOD PRESSURE: 104 MMHG | BODY MASS INDEX: 14.49 KG/M2 | WEIGHT: 87 LBS | DIASTOLIC BLOOD PRESSURE: 67 MMHG | HEART RATE: 65 BPM | HEIGHT: 65 IN | OXYGEN SATURATION: 97 %

## 2025-04-24 DIAGNOSIS — M75.81 ROTATOR CUFF TENDONITIS, RIGHT: ICD-10-CM

## 2025-04-24 DIAGNOSIS — M25.511 RIGHT SHOULDER PAIN, UNSPECIFIED CHRONICITY: Primary | ICD-10-CM

## 2025-04-24 NOTE — PROGRESS NOTES
"Chief Complaint  Follow-up of the Right Shoulder     Subjective      Ida Vincent presents to [facility] for follow up of the right shoulder.  She has had pain for awhile.  She had a feeling like something ripped.  She gets bone pain mid upper arm and feels like ice and electricity form the mid lower arm.  She has pain with the right shoulder and neck.  She tried physical therapy and that made her feel worse.   She has had this pain for years.  She has chronic insomnia.  She notes the pain management wont see her if she \"takes pot at night to sleep.\"      Allergies   Allergen Reactions    Ciprofloxacin Anaphylaxis and Other (See Comments)    Thimerosal (Thiomersal) Swelling    Benadryl [Diphenhydramine] Other (See Comments)     \"It makes me climb the walls and shake\"    Prednisone Other (See Comments)     CHF  Pt stated solu-medrol is okay to take    Prednisolone Itching    Tetanus Immune Globulin Swelling    Thimerosal Swelling    Erythromycin Nausea And Vomiting        Social History     Socioeconomic History    Marital status:    Tobacco Use    Smoking status: Every Day     Current packs/day: 0.50     Average packs/day: 0.5 packs/day for 46.3 years (23.2 ttl pk-yrs)     Types: Cigarettes     Start date: 4/1/1976     Last attempt to quit: 4/1/2021    Smokeless tobacco: Never   Vaping Use    Vaping status: Every Day    Substances: Nicotine   Substance and Sexual Activity    Alcohol use: Not Currently    Drug use: Not Currently     Frequency: 7.0 times per week     Types: Marijuana     Comment: Patient states she uses marijuana every night-01/06/2022    Sexual activity: Not Currently     Partners: Male     Birth control/protection: Vasectomy, Hysterectomy        I reviewed the patient's chief complaint, history of present illness, review of systems, past medical history, surgical history, family history, social history, medications, and allergy list.     Review of Systems " "    Constitutional: Denies fevers, chills, weight loss  Cardiovascular: Denies chest pain, shortness of breath  Skin: Denies rashes, acute skin changes  Neurologic: Denies headache, loss of consciousness        Vital Signs:   /67   Pulse 65   Ht 165.1 cm (65\")   Wt 39.5 kg (87 lb)   SpO2 97%   BMI 14.48 kg/m²          Physical Exam  General: Alert. No acute distress    Ortho Exam        RIGHT SHOULDER Forward flexion 130. Abduction 70. External rotation 40. Internal rotation SI joint. Positive Cross body adduction. Supraspinatus strength 3+/5. Infraspinatus Strength 4/5. Infrared subscap 4/5. Positive Bueno. Positive Neer. Negative Apprehension. Negative Lift off. (Negative Obriens. Sensation intact to light touch, median, radial, ulnar nerve. Positive AIN, PIN, ulnar nerve motor. Positive pulses. Positive Impingement signs.  Tender to palpation to the shoulder, neck and mid upp and lower arm.          Procedures      Imaging Results (Most Recent)       Procedure Component Value Units Date/Time    XR Scapula Right [[id number]] Resulted: 04/24/25 1438     Updated: 04/24/25 1440             Result Review :     X-Ray Report:  Right scapula X-Ray  Indication: Evaluation of the right scapula  AP/Lateral view(s)  Findings: Old AC separation and osteophyte formation noted.    Prior studies available for comparison: yes             Assessment and Plan     Diagnoses and all orders for this visit:    1. Right shoulder pain, unspecified chronicity (Primary)  -     XR Scapula Right    2. Rotator cuff tendonitis, right        Discussed the treatment plan with the patient. I reviewed the X-rays that were obtained today with the patient.     Discussed pain management and she states they won't take her.      Prescribed pain management.        Educated on risk of smoking/nicotine. Discussed options for smoking cessation regarding chantix, nicorette gum and/ or to call the quit hotline at [phone]  and Call or return " if worsening symptoms.    Follow Up     PRN      Patient was given instructions and counseling regarding her condition or for health maintenance advice. Please see specific information pulled into the AVS if appropriate.     Scribed for Marilyn Busch MD by Tika Hurd MA.  04/24/25   14:41 EDT    I have personally performed the services described in this document as scribed by the above individual and it is both accurate and complete. Marilyn Busch MD 04/24/25

## 2025-04-30 ENCOUNTER — OFFICE VISIT (OUTPATIENT)
Dept: FAMILY MEDICINE CLINIC | Facility: CLINIC | Age: 65
End: 2025-04-30
Payer: MEDICARE

## 2025-04-30 VITALS
DIASTOLIC BLOOD PRESSURE: 68 MMHG | SYSTOLIC BLOOD PRESSURE: 110 MMHG | TEMPERATURE: 98 F | OXYGEN SATURATION: 94 % | BODY MASS INDEX: 14.33 KG/M2 | HEART RATE: 66 BPM | HEIGHT: 65 IN | WEIGHT: 86 LBS

## 2025-04-30 DIAGNOSIS — J45.909 ASTHMA, UNSPECIFIED ASTHMA SEVERITY, UNSPECIFIED WHETHER COMPLICATED, UNSPECIFIED WHETHER PERSISTENT: ICD-10-CM

## 2025-04-30 DIAGNOSIS — E78.5 HYPERLIPIDEMIA, UNSPECIFIED HYPERLIPIDEMIA TYPE: ICD-10-CM

## 2025-04-30 DIAGNOSIS — Z87.891 PERSONAL HISTORY OF TOBACCO USE: ICD-10-CM

## 2025-04-30 DIAGNOSIS — R11.0 NAUSEA: ICD-10-CM

## 2025-04-30 DIAGNOSIS — R63.4 UNINTENTIONAL WEIGHT LOSS: ICD-10-CM

## 2025-04-30 DIAGNOSIS — F17.210 CIGARETTE NICOTINE DEPENDENCE WITHOUT COMPLICATION: ICD-10-CM

## 2025-04-30 DIAGNOSIS — E03.9 ACQUIRED HYPOTHYROIDISM: ICD-10-CM

## 2025-04-30 DIAGNOSIS — M79.7 FIBROMYALGIA: ICD-10-CM

## 2025-04-30 DIAGNOSIS — Z23 NEED FOR VACCINATION: ICD-10-CM

## 2025-04-30 DIAGNOSIS — G62.9 NEUROPATHY: ICD-10-CM

## 2025-04-30 DIAGNOSIS — R74.8 ELEVATED LIVER ENZYMES: ICD-10-CM

## 2025-04-30 DIAGNOSIS — K21.00 GASTROESOPHAGEAL REFLUX DISEASE WITH ESOPHAGITIS WITHOUT HEMORRHAGE: ICD-10-CM

## 2025-04-30 DIAGNOSIS — Z00.00 ENCOUNTER FOR SUBSEQUENT ANNUAL WELLNESS VISIT (AWV) IN MEDICARE PATIENT: Primary | ICD-10-CM

## 2025-04-30 DIAGNOSIS — Z79.899 MEDICATION MANAGEMENT: ICD-10-CM

## 2025-04-30 DIAGNOSIS — R35.0 URINARY FREQUENCY: ICD-10-CM

## 2025-04-30 LAB
ALBUMIN SERPL-MCNC: 4.6 G/DL (ref 3.5–5.2)
ALP SERPL-CCNC: 40 U/L (ref 39–117)
ALT SERPL W P-5'-P-CCNC: 110 U/L (ref 1–33)
AMPHET+METHAMPHET UR QL: NEGATIVE
AMPHETAMINE INTERNAL CONTROL: ABNORMAL
AMPHETAMINES UR QL: NEGATIVE
AST SERPL-CCNC: 106 U/L (ref 1–32)
B-HCG UR QL: NEGATIVE
BARBITURATE INTERNAL CONTROL: ABNORMAL
BARBITURATES UR QL SCN: NEGATIVE
BENZODIAZ UR QL SCN: NEGATIVE
BENZODIAZEPINE INTERNAL CONTROL: ABNORMAL
BILIRUB BLD-MCNC: NEGATIVE MG/DL
BILIRUB CONJ SERPL-MCNC: <0.1 MG/DL (ref 0–0.3)
BILIRUB INDIRECT SERPL-MCNC: ABNORMAL MG/DL
BILIRUB SERPL-MCNC: <0.2 MG/DL (ref 0–1.2)
BUPRENORPHINE INTERNAL CONTROL: ABNORMAL
BUPRENORPHINE SERPL-MCNC: NEGATIVE NG/ML
CANNABINOIDS SERPL QL: POSITIVE
CLARITY, POC: CLEAR
COCAINE INTERNAL CONTROL: ABNORMAL
COCAINE UR QL: NEGATIVE
COLOR UR: YELLOW
EXPIRATION DATE: ABNORMAL
EXPIRATION DATE: NORMAL
EXPIRATION DATE: NORMAL
GGT SERPL-CCNC: 13 U/L (ref 5–36)
GLUCOSE UR STRIP-MCNC: NEGATIVE MG/DL
HIV 1+2 AB+HIV1 P24 AG SERPL QL IA: NORMAL
INTERNAL NEGATIVE CONTROL: NORMAL
INTERNAL POSITIVE CONTROL: NORMAL
KETONES UR QL: NEGATIVE
LEUKOCYTE EST, POC: NEGATIVE
LIPASE SERPL-CCNC: 34 U/L (ref 13–60)
Lab: ABNORMAL
Lab: NORMAL
Lab: NORMAL
MDMA (ECSTASY) INTERNAL CONTROL: ABNORMAL
MDMA UR QL SCN: NEGATIVE
METHADONE INTERNAL CONTROL: ABNORMAL
METHADONE UR QL SCN: NEGATIVE
METHAMPHETAMINE INTERNAL CONTROL: ABNORMAL
MORPHINE INTERNAL CONTROL: ABNORMAL
MORPHINE/OPIATES SCREEN, URINE: NEGATIVE
NITRITE UR-MCNC: NEGATIVE MG/ML
OXYCODONE INTERNAL CONTROL: ABNORMAL
OXYCODONE UR QL SCN: NEGATIVE
PCP UR QL SCN: NEGATIVE
PH UR: 5.5 [PH] (ref 5–8)
PHENCYCLIDINE INTERNAL CONTROL: ABNORMAL
PROT SERPL-MCNC: 6.9 G/DL (ref 6–8.5)
PROT UR STRIP-MCNC: NEGATIVE MG/DL
RBC # UR STRIP: NEGATIVE /UL
SP GR UR: 1 (ref 1–1.03)
THC INTERNAL CONTROL: ABNORMAL
UROBILINOGEN UR QL: NORMAL

## 2025-04-30 PROCEDURE — 83690 ASSAY OF LIPASE: CPT | Performed by: NURSE PRACTITIONER

## 2025-04-30 PROCEDURE — 80076 HEPATIC FUNCTION PANEL: CPT | Performed by: NURSE PRACTITIONER

## 2025-04-30 PROCEDURE — 86381 MITOCHONDRIAL ANTIBODY EACH: CPT | Performed by: NURSE PRACTITIONER

## 2025-04-30 PROCEDURE — 86038 ANTINUCLEAR ANTIBODIES: CPT | Performed by: NURSE PRACTITIONER

## 2025-04-30 PROCEDURE — G0432 EIA HIV-1/HIV-2 SCREEN: HCPCS | Performed by: NURSE PRACTITIONER

## 2025-04-30 PROCEDURE — 86592 SYPHILIS TEST NON-TREP QUAL: CPT | Performed by: NURSE PRACTITIONER

## 2025-04-30 PROCEDURE — 82977 ASSAY OF GGT: CPT | Performed by: NURSE PRACTITIONER

## 2025-04-30 RX ORDER — ASPIRIN 81 MG/1
81 TABLET ORAL DAILY
Qty: 90 TABLET | Refills: 3 | Status: SHIPPED | OUTPATIENT
Start: 2025-04-30

## 2025-04-30 RX ORDER — METOPROLOL SUCCINATE 25 MG/1
25 TABLET, EXTENDED RELEASE ORAL 2 TIMES DAILY
Qty: 180 TABLET | Refills: 1 | Status: SHIPPED | OUTPATIENT
Start: 2025-04-30

## 2025-04-30 RX ORDER — LEVOTHYROXINE SODIUM 50 UG/1
50 TABLET ORAL
Qty: 90 TABLET | Refills: 1 | Status: SHIPPED | OUTPATIENT
Start: 2025-04-30

## 2025-04-30 RX ORDER — PANTOPRAZOLE SODIUM 20 MG/1
20 TABLET, DELAYED RELEASE ORAL DAILY
Qty: 90 TABLET | Refills: 1 | Status: SHIPPED | OUTPATIENT
Start: 2025-04-30

## 2025-04-30 RX ORDER — ROSUVASTATIN CALCIUM 40 MG/1
40 TABLET, COATED ORAL NIGHTLY
Qty: 90 TABLET | Refills: 1 | Status: SHIPPED | OUTPATIENT
Start: 2025-04-30

## 2025-04-30 RX ORDER — CETIRIZINE HYDROCHLORIDE 10 MG/1
10 TABLET ORAL DAILY
Qty: 90 TABLET | Refills: 3 | Status: SHIPPED | OUTPATIENT
Start: 2025-04-30

## 2025-04-30 RX ORDER — METOPROLOL SUCCINATE 50 MG/1
50 TABLET, EXTENDED RELEASE ORAL 2 TIMES DAILY
Qty: 180 TABLET | Refills: 1 | Status: SHIPPED | OUTPATIENT
Start: 2025-04-30

## 2025-04-30 RX ORDER — ONDANSETRON 8 MG/1
8 TABLET, FILM COATED ORAL EVERY 8 HOURS PRN
Qty: 30 TABLET | Refills: 5 | Status: SHIPPED | OUTPATIENT
Start: 2025-04-30

## 2025-04-30 RX ORDER — QUETIAPINE FUMARATE 100 MG/1
150 TABLET, FILM COATED ORAL NIGHTLY
Qty: 135 TABLET | Refills: 1 | Status: SHIPPED | OUTPATIENT
Start: 2025-04-30

## 2025-04-30 RX ORDER — FLUTICASONE FUROATE, UMECLIDINIUM BROMIDE AND VILANTEROL TRIFENATATE 100; 62.5; 25 UG/1; UG/1; UG/1
1 POWDER RESPIRATORY (INHALATION)
Qty: 60 EACH | Refills: 2 | Status: SHIPPED | OUTPATIENT
Start: 2025-04-30

## 2025-04-30 RX ORDER — UBIDECARENONE 100 MG
100 CAPSULE ORAL DAILY
Qty: 90 CAPSULE | Refills: 1 | Status: SHIPPED | OUTPATIENT
Start: 2025-04-30

## 2025-04-30 RX ORDER — TRAZODONE HYDROCHLORIDE 100 MG/1
200 TABLET ORAL NIGHTLY
Qty: 90 TABLET | Refills: 2 | Status: SHIPPED | OUTPATIENT
Start: 2025-04-30

## 2025-04-30 RX ORDER — QUETIAPINE FUMARATE 300 MG/1
150 TABLET, FILM COATED ORAL NIGHTLY
Qty: 45 TABLET | Refills: 3 | Status: SHIPPED | OUTPATIENT
Start: 2025-04-30 | End: 2025-04-30

## 2025-04-30 RX ORDER — MAGNESIUM OXIDE 400 MG/1
400 TABLET ORAL DAILY
Qty: 90 TABLET | Refills: 3 | Status: SHIPPED | OUTPATIENT
Start: 2025-04-30

## 2025-04-30 RX ORDER — GABAPENTIN 300 MG/1
300 CAPSULE ORAL 2 TIMES DAILY
Qty: 180 CAPSULE | Refills: 1 | Status: SHIPPED | OUTPATIENT
Start: 2025-04-30

## 2025-04-30 RX ORDER — ALBUTEROL SULFATE 90 UG/1
2 INHALANT RESPIRATORY (INHALATION) EVERY 4 HOURS PRN
Qty: 8 G | Refills: 3 | Status: SHIPPED | OUTPATIENT
Start: 2025-04-30

## 2025-04-30 RX ORDER — HYDROXYZINE HYDROCHLORIDE 10 MG/5ML
4 SYRUP ORAL EVERY 6 HOURS PRN
Qty: 60 TABLET | Refills: 1 | Status: SHIPPED | OUTPATIENT
Start: 2025-04-30

## 2025-04-30 NOTE — PROGRESS NOTES
Subjective   The ABCs of the Annual Wellness Visit  Medicare Wellness Visit      Ida Vincent is a 64 y.o. patient who presents for a Medicare Wellness Visit.    The following portions of the patient's history were reviewed and   updated as appropriate: allergies, current medications, past family history, past medical history, past social history, past surgical history, and problem list.    Compared to one year ago, the patient's physical   health is worse.  Compared to one year ago, the patient's mental   health is the same.    Recent Hospitalizations:  She was not admitted to the hospital during the last year.     Current Medical Providers:  Patient Care Team:  Jennifer Ho APRN as PCP - General (Nurse Practitioner)  Phu Nicholson MD as Cardiologist (Cardiology)    Outpatient Medications Prior to Visit   Medication Sig Dispense Refill    Calcium Citrate-Vitamin D3 (CITRACAL) 315-6.25 MG-MCG tablet tablet 1 tablet Daily.      Denosumab (PROLIA SC) Inject  under the skin into the appropriate area as directed Every 6 (Six) Months.      NON FORMULARY TUMERIC      albuterol sulfate  (90 Base) MCG/ACT inhaler Inhale 2 puffs Every 4 (Four) Hours As Needed for Wheezing or Shortness of Air. 8 g 3    aspirin (ASPIRIN LOW DOSE) 81 MG EC tablet Take 1 tablet by mouth Daily. 90 tablet 3    cetirizine (zyrTEC) 10 MG tablet Take 1 tablet by mouth Daily. 90 tablet 3    chlorpheniramine (Chlor-Trimeton) 4 MG tablet Take 1 tablet by mouth Every 6 (Six) Hours As Needed for Allergies or Rhinitis. 60 tablet 1    coenzyme Q10 100 MG capsule Take 1 capsule by mouth Daily. 90 capsule 1    Fluticasone-Umeclidin-Vilant (Trelegy Ellipta) 100-62.5-25 MCG/ACT inhaler Inhale 1 puff Daily. 60 each 2    gabapentin (NEURONTIN) 300 MG capsule Take 1 capsule by mouth 2 (Two) Times a Day. 180 capsule 1    levothyroxine (SYNTHROID, LEVOTHROID) 50 MCG tablet Take 1 tablet by mouth Every Morning. 90 tablet 1    magnesium  oxide (MAG-OX) 400 MG tablet Take 1 tablet by mouth Daily. 90 tablet 3    metoprolol succinate XL (TOPROL-XL) 25 MG 24 hr tablet Take 1 tablet by mouth 2 (Two) Times a Day. 180 tablet 1    metoprolol succinate XL (TOPROL-XL) 50 MG 24 hr tablet Take 1 tablet by mouth 2 (Two) Times a Day. 180 tablet 1    ondansetron (ZOFRAN) 8 MG tablet Take 1 tablet by mouth Every 8 (Eight) Hours As Needed for Nausea or Vomiting. 30 tablet 5    pantoprazole (PROTONIX) 20 MG EC tablet Take 1 tablet by mouth Daily. 90 tablet 1    QUEtiapine (SEROquel) 300 MG tablet Take 0.5 tablets by mouth Every Night. 45 tablet 3    rosuvastatin (CRESTOR) 40 MG tablet Take 1 tablet by mouth Every Night. 90 tablet 1    traZODone (DESYREL) 100 MG tablet Take 2 tablets by mouth Every Night. 90 tablet 2     No facility-administered medications prior to visit.     No opioid medication identified on active medication list. I have reviewed chart for other potential  high risk medication/s and harmful drug interactions in the elderly.      Aspirin is on active medication list. Aspirin use is indicated based on review of current medical condition/s. Pros and cons of this therapy have been discussed today. Benefits of this medication outweigh potential harm.  Patient has been encouraged to continue taking this medication.  .      Patient Active Problem List   Diagnosis    Chronic obstructive pulmonary disease    Hyperlipemia    Acquired hypothyroidism    Asthma    Age related osteoporosis    Cigarette nicotine dependence without complication    Gastroesophageal reflux disease without esophagitis    Age-related osteoporosis without current pathological fracture    Chronic allergic rhinitis    Acute non-recurrent maxillary sinusitis     Advance Care Planning Advance Directive is not on file.  ACP discussion was held with the patient during this visit. Patient has an advance directive (not in EMR), copy requested.            Objective   Vitals:    04/30/25 1529  "  BP: 110/68   Pulse: 66   Temp: 98 °F (36.7 °C)   SpO2: 94%   Weight: 39 kg (86 lb)   Height: 165.1 cm (65\")   PainSc: 9    PainLoc: Shoulder       Estimated body mass index is 14.31 kg/m² as calculated from the following:    Height as of this encounter: 165.1 cm (65\").    Weight as of this encounter: 39 kg (86 lb).         Gait and Balance Evaluation:  Normal         Does the patient have evidence of cognitive impairment? No                                                                                                Health  Risk Assessment    Smoking Status:  Social History     Tobacco Use   Smoking Status Some Days    Current packs/day: 0.50    Average packs/day: 0.5 packs/day for 46.3 years (23.2 ttl pk-yrs)    Types: Cigarettes    Start date: 1976    Last attempt to quit: 2021   Smokeless Tobacco Never     Alcohol Consumption:  Social History     Substance and Sexual Activity   Alcohol Use Not Currently       Fall Risk Screen  [facility], and patient is at LOW risk for falls.Assessment completed on:2025    Depression Screening   Little interest or pleasure in doing things? Not at all   Feeling down, depressed, or hopeless? Not at all   PHQ-2 Total Score 0      Health Habits and Functional and Cognitive Screenin/30/2025    12:53 PM   Functional & Cognitive Status   Do you have difficulty preparing food and eating? No   Do you have difficulty bathing yourself, getting dressed or grooming yourself? No   Do you have difficulty using the toilet? No   Do you have difficulty moving around from place to place? No   Do you have trouble with steps or getting out of a bed or a chair? No   Current Diet Limited Junk Food   Dental Exam Up to date   Eye Exam Up to date   Exercise (times per week) 0 times per week   Current Exercises Include No Regular Exercise;Swimming   Do you need help using the phone?  No   Are you deaf or do you have serious difficulty hearing?  No "   Do you need help to go to places out of walking distance? No   Do you need help shopping? No   Do you need help preparing meals?  No   Do you need help with housework?  Yes   Do you need help with laundry? No   Do you need help taking your medications? No   Do you need help managing money? No   Do you ever drive or ride in a car without wearing a seat belt? No   Have you felt unusual stress, anger or loneliness in the last month? No   Who do you live with? Spouse   If you need help, do you have trouble finding someone available to you? No   Have you been bothered in the last four weeks by sexual problems? No   Do you have difficulty concentrating, remembering or making decisions? No           Age-appropriate Screening Schedule:  Refer to the list below for future screening recommendations based on patient's age, sex and/or medical conditions. Orders for these recommended tests are listed in the plan section. The patient has been provided with a written plan.    Health Maintenance List  Health Maintenance   Topic Date Due    LUNG CANCER SCREENING  04/04/2025    TDAP/TD VACCINES (1 - Tdap) 06/19/2025 (Originally 6/19/1979)    COVID-19 Vaccine (3 - 2024-25 season) 10/01/2025 (Originally 9/1/2024)    Pneumococcal Vaccine 50+ (1 of 2 - PCV) 10/27/2025 (Originally [birth year]/19/1979)    ZOSTER VACCINE (1 of 2) 10/27/2025 (Originally 6/19/2012)    INFLUENZA VACCINE  07/01/2025    LIPID PANEL  01/28/2026    ANNUAL WELLNESS VISIT  04/30/2026    MAMMOGRAM  11/26/2026    COLORECTAL CANCER SCREENING  04/14/2031    HEPATITIS C SCREENING  Completed                                                                                                                                                CMS Preventative Services Quick Reference  Risk Factors Identified During Encounter  None Identified    The above risks/problems have been discussed with the patient.  Pertinent information has been shared with the patient in the After Visit  Summary.  An After Visit Summary and PPPS were made available to the patient.    Follow Up:   Next Medicare Wellness visit to be scheduled in 1 year.          Additional E&M Note during same encounter follows:  Patient has multiple medical problems which are significant and separately identifiable that require additional work above and beyond the Medicare Wellness Visit.      Chief Complaint  Medicare Wellness-subsequent    History of Present Illness  The patient presents for an annual wellness 3-month follow-up.    She has been experiencing shoulder pain since 1988, which has progressively worsened and is now radiating down her arm. 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 Physical therapy has been tried without success, and numerous steroid injections in the shoulder since 1988 have not provided relief. She is seeking a new orthopedic specialist and plans to consult with Dr. Oh at [facility]. Narcotic pain management is not desired. Marijuana is used at night to aid sleep.    Unintentional weight loss is reported, despite not engaging in self-starvation. Occasional bouts of vomiting occur, and a refill of her Zofran prescription is needed. A nutritionist has not been consulted due to fear of being advised to consume three meals a day, as finishing one meal is a struggle. She believes her caloric intake is sufficient when she eats. Eating habits have remained the same since adolescence, and weight loss began after receiving the COVID-19 vaccine. No feelings of illness or unhealthiness are reported. The diet includes fruits and vegetables, but a healthy dietary intake has never been maintained. Nutritional supplements are planned to be started. Seven years ago, after returning from [place], she weighed 140 pounds without dieting. Currently, she can barely eat half a bowl of soup, half a sandwich, and a bigger at [employer].    Frequent urination is experienced, often getting up multiple times during the night to urinate. Large amounts of fluids have always been consumed throughout the day. No history of urinary tract infection, bladder infection, kidney infection, or yeast infection is reported. Diflucan was taken prophylactically while on another medication. Medication for overactive bladder has been taken in the past.    She continues to smoke a few cigarettes daily and occasionally vapes. Smoking started at age 16, and consumption was reduced about four years ago. At her peak, two packs a day were smoked. Shortness of breath is experienced, attributed to the smoking habit.    Currently, Seroquel 150 mg is taken for sleep, but it is reported to be less effective than the enteric-coated version. Gabapentin has not been taken for some time as it was ineffective.    High cholesterol and triglycerides have always been present, managed with medication. Levels increase when medication is discontinued. Zetia was previously taken.    Osteoporosis is present.    No abdominal pain is reported, but something can be felt when lying down, attributed to thinness. A cortisol level test is requested.    A dermatologist appointment is pending, but the dermatologist she saw said they would call her back, and she does not remember who she saw.    Metoprolol 75 mg is taken daily (25 mg and 50 mg) and supplements with magnesium and turmeric.    SOCIAL HISTORY  The patient smokes a couple of cigarettes a day and occasionally vapes. The patient started smoking at age 16 and significantly reduced smoking about 4 years ago. The patient does not drink alcohol.    FAMILY HISTORY  The patient's father  of pancreatic cancer. The patient's mother had tonsil cancer. The patient's grandmother on the father's side had breast cancer, and the patient's aunt on the father's side had breast cancer. Every sibling of the patient's father who  had some form  "of cancer, mostly stomach cancer.      Ida Vincent is a 64 y.o. female who presents to [facility]       Objective   Vital Signs:   Vitals:    04/30/25 1529   BP: 110/68   Pulse: 66   Temp: 98 °F (36.7 °C)   SpO2: 94%   Weight: 39 kg (86 lb)   Height: 165.1 cm (65\")   PainSc: 9    PainLoc: Shoulder       Wt Readings from Last 3 Encounters:   04/30/25 39 kg (86 lb)   04/24/25 39.5 kg (87 lb)   03/25/25 39.7 kg (87 lb 8.4 oz)     BP Readings from Last 3 Encounters:   04/30/25 110/68   04/24/25 104/67   03/25/25 101/57       Physical Exam  Vitals reviewed.   Constitutional:       Appearance: Normal appearance. She is well-developed. She is cachectic.   HENT:      Head: Normocephalic and atraumatic.   Eyes:      Conjunctiva/sclera: Conjunctivae normal.      Pupils: Pupils are equal, round, and reactive to light.   Cardiovascular:      Rate and Rhythm: Normal rate and regular rhythm.      Heart sounds: No murmur heard.  Pulmonary:      Effort: Pulmonary effort is normal.      Breath sounds: Normal breath sounds. No wheezing.   Skin:     General: Skin is warm and dry.   Neurological:      Mental Status: She is alert and oriented to person, place, and time.   Psychiatric:         Mood and Affect: Mood and affect normal.         Behavior: Behavior normal.         Thought Content: Thought content normal.         Judgment: Judgment normal.         The following data was reviewed by BRYANT Ruelas on 04/30/2025  Common Labs   Common labs          3/19/2025    13:09 3/25/2025    15:21 4/30/2025    16:46   Common Labs   Glucose 87  94     BUN 11  7     Creatinine 0.73  0.76     Sodium 136  136     Potassium 4.2  4.7     Chloride 99  97     Calcium 9.3  10.1     Albumin 4.7  4.8  4.6    Total Bilirubin 0.3  0.4  <0.2    Alkaline Phosphatase 46  48  40    AST (SGOT) 131  70  106    ALT (SGPT) 193  131  110      Previous office note     Assessment & Plan   Diagnoses and all orders " for this visit:    1. Encounter for subsequent annual wellness visit (AWV) in Medicare patient (Primary)    2. Need for vaccination    3. Asthma, unspecified asthma severity, unspecified whether complicated, unspecified whether persistent  -     cetirizine (zyrTEC) 10 MG tablet; Take 1 tablet by mouth Daily.  Dispense: 90 tablet; Refill: 3    4. Neuropathy  Comments:  Will increase gabapentin to 300 mg twice daily, patient is agreeable  Orders:  -     gabapentin (NEURONTIN) 300 MG capsule; Take 1 capsule by mouth 2 (Two) Times a Day.  Dispense: 180 capsule; Refill: 1  -     POC Medline 12 Panel Urine Drug Screen    5. Fibromyalgia  -     gabapentin (NEURONTIN) 300 MG capsule; Take 1 capsule by mouth 2 (Two) Times a Day.  Dispense: 180 capsule; Refill: 1    6. Acquired hypothyroidism  -     levothyroxine (SYNTHROID, LEVOTHROID) 50 MCG tablet; Take 1 tablet by mouth Every Morning.  Dispense: 90 tablet; Refill: 1    7. Nausea  Comments:  Chronic nausea needing refill of Zofran.  Orders:  -     ondansetron (ZOFRAN) 8 MG tablet; Take 1 tablet by mouth Every 8 (Eight) Hours As Needed for Nausea or Vomiting.  Dispense: 30 tablet; Refill: 5    8. Gastroesophageal reflux disease with esophagitis without hemorrhage  Comments:  Continue on Protonix, patient is agreeable  Orders:  -     ondansetron (ZOFRAN) 8 MG tablet; Take 1 tablet by mouth Every 8 (Eight) Hours As Needed for Nausea or Vomiting.  Dispense: 30 tablet; Refill: 5  -     pantoprazole (PROTONIX) 20 MG EC tablet; Take 1 tablet by mouth Daily.  Dispense: 90 tablet; Refill: 1    9. Cigarette nicotine dependence without complication  -      CT Chest Low Dose Cancer Screening WO; Future    10. Personal history of tobacco use  -      CT Chest Low Dose Cancer Screening WO; Future    11. Elevated liver enzymes  -     Lipase  -     Gamma GT  -     Mitochondrial Antibodies, M2  -     Hepatic Function Panel  -     ASHLEY + PE  -     Ferritin  -     Iron Profile  -      Ceruloplasmin    12. Unintentional weight loss  -     DARON  -     RPR  -     HIV-1/O/2 ANTIGEN/ANTIBODY, 4TH GENERATION    13. Urinary frequency  -     POCT urinalysis dipstick, automated  -     POCT pregnancy, urine    14. Medication management  -     POC Medline 12 Panel Urine Drug Screen    15. Hyperlipidemia, unspecified hyperlipidemia type  -     rosuvastatin (CRESTOR) 40 MG tablet; Take 1 tablet by mouth Every Night.  Dispense: 90 tablet; Refill: 1    Other orders  -     albuterol sulfate  (90 Base) MCG/ACT inhaler; Inhale 2 puffs Every 4 (Four) Hours As Needed for Wheezing or Shortness of Air.  Dispense: 8 g; Refill: 3  -     aspirin (ASPIRIN LOW DOSE) 81 MG EC tablet; Take 1 tablet by mouth Daily.  Dispense: 90 tablet; Refill: 3  -     chlorpheniramine (Chlor-Trimeton) 4 MG tablet; Take 1 tablet by mouth Every 6 (Six) Hours As Needed for Allergies or Rhinitis.  Dispense: 60 tablet; Refill: 1  -     coenzyme Q10 100 MG capsule; Take 1 capsule by mouth Daily.  Dispense: 90 capsule; Refill: 1  -     Fluticasone-Umeclidin-Vilant (Trelegy Ellipta) 100-62.5-25 MCG/ACT inhaler; Inhale 1 puff Daily.  Dispense: 60 each; Refill: 2  -     magnesium oxide (MAG-OX) 400 MG tablet; Take 1 tablet by mouth Daily.  Dispense: 90 tablet; Refill: 3  -     metoprolol succinate XL (TOPROL-XL) 25 MG 24 hr tablet; Take 1 tablet by mouth 2 (Two) Times a Day.  Dispense: 180 tablet; Refill: 1  -     metoprolol succinate XL (TOPROL-XL) 50 MG 24 hr tablet; Take 1 tablet by mouth 2 (Two) Times a Day.  Dispense: 180 tablet; Refill: 1  -     Discontinue: QUEtiapine (SEROquel) 300 MG tablet; Take 0.5 tablets by mouth Every Night.  Dispense: 45 tablet; Refill: 3  -     traZODone (DESYREL) 100 MG tablet; Take 2 tablets by mouth Every Night.  Dispense: 90 tablet; Refill: 2  -     QUEtiapine (SEROquel) 100 MG tablet; Take 1.5 tablets by mouth Every Night.  Dispense: 135 tablet; Refill: 1      Pain Management Panel  More data exists          Latest Ref Rng & Units 4/30/2025 8/30/2024   Pain Management Panel   Amphetamine, Urine Qual Negative Negative  Negative    Barbiturates Screen, Urine Negative Negative  Negative    Benzodiazepine Screen, Urine Negative Negative  Negative    Buprenorphine, Screen, Urine Negative Negative  Negative    Cocaine Screen, Urine Negative Negative  Negative    Methadone Screen , Urine Negative Negative  Negative    Methamphetamine, Ur Negative Negative  Negative       Assessment & Plan  1. Shoulder pain.  - Experiencing shoulder pain since 1988, progressively worsening.  - Physical therapy and steroid injections have not been effective.  - Seeking further evaluation from [facility] and a different orthopedic doctor.  - If symptoms persist, further diagnostic imaging may be considered.    2. Weight loss.  - BMI is currently 14, difficulty gaining weight, occasional bouts of vomiting.  - Referral to a nutritionist recommended to address dietary intake and caloric needs.  - Advised to increase protein intake and consider nutritional supplements like protein shakes.  - Reports eating fruits and vegetables but struggles with larger meals.    3. Frequent urination.  - Reports frequent urination, especially at night.  - Urine test will be conducted to rule out a urinary tract infection (UTI).  - No history of UTIs, bladder infections, or kidney infections.    4. Smoking.  - Continues to smoke a couple of cigarettes a day and occasionally uses a vape.  - Low-dose CT scan of lungs will be ordered due to smoking history.  - Started smoking at age 16, significantly reduced smoking about 4 years ago.    5. Insomnia.  - Currently on Seroquel 150 mg for sleep but prefers the 100 mg tablets.  - Prescription will be adjusted to 100 mg tablets, with instructions to take 1-1/2 tablets as needed for sleep.  - Reports difficulty sleeping and frequent waking during the night.    6. Hyperlipidemia.  - History of high cholesterol and  triglycerides, managed with medication.  - Current medication regimen will be continued.  - Levels are normal with medication but increase when off medication.    7. Osteoporosis.  - Diagnosed with osteoporosis, contributing to small stature.  - Continued monitoring and management of bone health recommended.  - Family history includes various cancers but no similar body type.    8. Elevated liver function tests.  - Liver function tests have been trending down but are still not within the desired range.  - Recheck of liver function tests will be conducted.  - If results remain elevated, an ultrasound of the liver and abdomen will be performed to assess for any inflammation.    9. Dermatology follow-up.  - Advised to follow up with a dermatologist for any skin concerns.  - Previous dermatologist appointment pending callback for further evaluation.              FOLLOW UP  Return in about 3 months (around 7/30/2025) for Recheck.  Patient was given instructions and counseling regarding her condition or for health maintenance advice. Please see specific information pulled into the AVS if appropriate.     BRYANT Ruelas  05/05/25  07:26 EDT    Patient or patient representative verbalized consent for the use of Ambient Listening during the visit with  BRYANT Ruelas for chart documentation. 5/5/2025  16:12 EDT

## 2025-05-01 LAB — RPR SER QL: NORMAL

## 2025-05-02 LAB
ANA SER QL: NEGATIVE
MITOCHONDRIA M2 IGG SER-ACNC: <20 UNITS (ref 0–20)

## 2025-05-13 ENCOUNTER — HOSPITAL ENCOUNTER (OUTPATIENT)
Dept: CT IMAGING | Facility: HOSPITAL | Age: 65
Discharge: HOME | End: 2025-05-13
Admitting: NURSE PRACTITIONER
Payer: MEDICARE

## 2025-05-13 DIAGNOSIS — F17.210 CIGARETTE NICOTINE DEPENDENCE WITHOUT COMPLICATION: ICD-10-CM

## 2025-05-13 DIAGNOSIS — Z87.891 PERSONAL HISTORY OF TOBACCO USE: ICD-10-CM

## 2025-05-13 PROCEDURE — 71271 CT THORAX LUNG CANCER SCR C-: CPT

## 2025-05-15 ENCOUNTER — OFFICE VISIT (OUTPATIENT)
Dept: CARDIOLOGY | Facility: CLINIC | Age: 65
End: 2025-05-15
Payer: MEDICARE

## 2025-05-15 VITALS
WEIGHT: 86.4 LBS | BODY MASS INDEX: 14.39 KG/M2 | HEIGHT: 65 IN | HEART RATE: 66 BPM | SYSTOLIC BLOOD PRESSURE: 102 MMHG | DIASTOLIC BLOOD PRESSURE: 50 MMHG

## 2025-05-15 DIAGNOSIS — R55 POSTURAL DIZZINESS WITH PRESYNCOPE: ICD-10-CM

## 2025-05-15 DIAGNOSIS — R00.2 PALPITATIONS: Primary | ICD-10-CM

## 2025-05-15 DIAGNOSIS — E78.2 MIXED HYPERLIPIDEMIA: ICD-10-CM

## 2025-05-15 DIAGNOSIS — R42 POSTURAL DIZZINESS WITH PRESYNCOPE: ICD-10-CM

## 2025-05-15 DIAGNOSIS — Z72.0 TOBACCO ABUSE: ICD-10-CM

## 2025-05-15 DIAGNOSIS — I25.10 CORONARY ARTERY CALCIFICATION: ICD-10-CM

## 2025-05-15 PROCEDURE — 99214 OFFICE O/P EST MOD 30 MIN: CPT | Performed by: INTERNAL MEDICINE

## 2025-05-15 NOTE — PROGRESS NOTES
Chief Complaint  Coronary artery calcification and Dizziness (6m Follow Up)    Subjective        Ida Vincent presents to [facility] CARDIOLOGY  History of present illness:    Patient still notes some lightheadedness.  She has not passed out.  She states is not bad enough that she wants to consider a medication.  She feels like her palpitations are overall doing pretty well.  Only if she is very tired or stressed out that she feels.  She is still drinking a lot of diet Pepsi's and not much water.  She is using more liberal salt.  She is taking the aspirin.  She notes no exertional chest pain.  She continues to cut down on smoking with goal of quitting.      Past Medical History:   Diagnosis Date    Abnormal ECG     Acromioclavicular separation 1988    Allergic     Anxiety 1999    Arthritis     Arthritis of neck     Asthma     Cervical disc disorder     Bylging    Cholelithiasis 1999    Congenital heart disease     COPD (chronic obstructive pulmonary disease)     Coronary artery disease     DDD (degenerative disc disease), lumbar     Depression 1999    Dislocation of finger     Diverticulosis 2021    Fibromyalgia     Fibromyalgia, primary     Fracture, clavicle 11/28/19    Left    Frozen shoulder     GERD (gastroesophageal reflux disease)     Heart block     History of diverticulosis     History of palpitations     Hyperlipemia     Hypothyroidism     Low back pain     Low back strain     Lumbago     Lumbosacral disc disease     Osteopenia 2021    Periarthritis of shoulder     Pneumonia 1976    Premature ventricular contractions (PVCs) (VPCs)     HISTORY OF    Rotator cuff syndrome     Seasonal allergies     Slow to wake up after anesthesia     Substance abuse     Last drink Nov2019    Thyroid disorder          Past Surgical History:   Procedure Laterality Date    ABDOMINOPLASTY      ATRIAL SEPTAL DEFECT REPAIR      BACK SURGERY      Kypoplasty    BREAST AUGMENTATION      BREAST IMPLANT SURGERY  Bilateral 10/18/2021    Procedure: BILATERAL REMOVAL OF IMPLANTS;  Surgeon: BRUCE Suresh MD;  Location: [facility] MAIN OR;  Service: Plastics;  Laterality: Bilateral;    BREAST SURGERY  10/18/21    CARDIAC CATHETERIZATION      CHOLECYSTECTOMY  1999    COLONOSCOPY  2011    COSMETIC SURGERY      Tummy tuck    DENTAL PROCEDURE      ENDOSCOPY  2011    GALLBLADDER SURGERY      HYSTERECTOMY      MASTOPEXY Bilateral 10/18/2021    Procedure: BILATERAL MASTOPEXY;  Surgeon: BRUCE Suresh MD;  Location: [facility] MAIN OR;  Service: Plastics;  Laterality: Bilateral;    OSTEOTOMY      OTHER SURGICAL HISTORY      metal implants     OTHER SURGICAL HISTORY      surgical clips RIGHT FACE ARTERY WITH JAW SURGERY    SEPTOPLASTY  2010    SHOULDER ARTHROSCOPIC ACROMIOCLAVICULAR (AC) JOINT RECONSTRUCTION Right     SHOULDER SURGERY      Rt AC    SINUS SURGERY      SPINE SURGERY  2021    TONSILLECTOMY      TOTAL ABDOMINAL HYSTERECTOMY WITH SALPINGO OOPHORECTOMY      TVH    VERTEBROPLASTY  06/04/2021    Lumbar 2          Social History     Socioeconomic History    Marital status:    Tobacco Use    Smoking status: Some Days     Current packs/day: 0.50     Average packs/day: 0.5 packs/day for 46.4 years (23.2 ttl pk-yrs)     Types: Cigarettes     Start date: 4/1/1976     Last attempt to quit: 4/1/2021    Smokeless tobacco: Never   Vaping Use    Vaping status: Every Day    Substances: Nicotine   Substance and Sexual Activity    Alcohol use: Not Currently    Drug use: Not Currently     Frequency: 7.0 times per week     Types: Marijuana     Comment: Only for sleep    Sexual activity: Not Currently     Partners: Male     Birth control/protection: Vasectomy, Hysterectomy         Family History   Problem Relation Age of Onset    Diabetes Mother     Hypertension Mother     Anxiety disorder Mother     Arthritis Mother     Alcohol abuse Mother     Thyroid disease Mother     Vision loss Mother     Hyperlipidemia Mother     Heart attack Mother      "Heart disease Mother     Cancer Mother     Broken bones Mother     Osteoporosis Mother     Cancer Father     Arthritis Father     Early death Father     Mental illness Sister     Arthritis Sister     Hyperlipidemia Sister     Broken bones Sister     Early death Brother     Diabetes Brother     Arthritis Brother     Liver disease Brother     Heart disease Daughter         Cardiac ablation    Other Son     Malig Hyperthermia Neg Hx           Allergies   Allergen Reactions    Ciprofloxacin Anaphylaxis and Other (See Comments)    Thimerosal (Thiomersal) Swelling    Benadryl [Diphenhydramine] Other (See Comments)     \"It makes me climb the walls and shake\"    Prednisone Other (See Comments)     CHF  Pt stated solu-medrol is okay to take    Prednisolone Itching    Tetanus Immune Globulin Swelling    Thimerosal Swelling    Erythromycin Nausea And Vomiting            Current Outpatient Medications:     albuterol sulfate  (90 Base) MCG/ACT inhaler, Inhale 2 puffs Every 4 (Four) Hours As Needed for Wheezing or Shortness of Air., Disp: 8 g, Rfl: 3    aspirin (ASPIRIN LOW DOSE) 81 MG EC tablet, Take 1 tablet by mouth Daily., Disp: 90 tablet, Rfl: 3    Calcium Citrate-Vitamin D3 (CITRACAL) 315-6.25 MG-MCG tablet tablet, 1 tablet Daily., Disp: , Rfl:     cetirizine (zyrTEC) 10 MG tablet, Take 1 tablet by mouth Daily., Disp: 90 tablet, Rfl: 3    chlorpheniramine (Chlor-Trimeton) 4 MG tablet, Take 1 tablet by mouth Every 6 (Six) Hours As Needed for Allergies or Rhinitis., Disp: 60 tablet, Rfl: 1    coenzyme Q10 100 MG capsule, Take 1 capsule by mouth Daily., Disp: 90 capsule, Rfl: 1    Denosumab (PROLIA SC), Inject  under the skin into the appropriate area as directed Every 6 (Six) Months., Disp: , Rfl:     Fluticasone-Umeclidin-Vilant (Trelegy Ellipta) 100-62.5-25 MCG/ACT inhaler, Inhale 1 puff Daily., Disp: 60 each, Rfl: 2    levothyroxine (SYNTHROID, LEVOTHROID) 50 MCG tablet, Take 1 tablet by mouth Every Morning., Disp: " "90 tablet, Rfl: 1    magnesium oxide (MAG-OX) 400 MG tablet, Take 1 tablet by mouth Daily., Disp: 90 tablet, Rfl: 3    metoprolol succinate XL (TOPROL-XL) 25 MG 24 hr tablet, Take 1 tablet by mouth 2 (Two) Times a Day., Disp: 180 tablet, Rfl: 1    metoprolol succinate XL (TOPROL-XL) 50 MG 24 hr tablet, Take 1 tablet by mouth 2 (Two) Times a Day., Disp: 180 tablet, Rfl: 1    NON FORMULARY, TUMERIC, Disp: , Rfl:     ondansetron (ZOFRAN) 8 MG tablet, Take 1 tablet by mouth Every 8 (Eight) Hours As Needed for Nausea or Vomiting., Disp: 30 tablet, Rfl: 5    pantoprazole (PROTONIX) 20 MG EC tablet, Take 1 tablet by mouth Daily., Disp: 90 tablet, Rfl: 1    QUEtiapine (SEROquel) 100 MG tablet, Take 1.5 tablets by mouth Every Night., Disp: 135 tablet, Rfl: 1    rosuvastatin (CRESTOR) 40 MG tablet, Take 1 tablet by mouth Every Night., Disp: 90 tablet, Rfl: 1    traZODone (DESYREL) 100 MG tablet, Take 2 tablets by mouth Every Night., Disp: 90 tablet, Rfl: 2      ROS:  Cardiac review of systems positive for lightheadedness and occasional palpitation.    Objective     /50   Pulse 66   Ht 165.1 cm (65\")   Wt 39.2 kg (86 lb 6.4 oz)   BMI 14.38 kg/m²       General Appearance:   well developed  well nourished  HENT:   oropharynx moist  lips not cyanotic  Respiratory:  no respiratory distress  normal breath sounds  no rales  Cardiovascular:  no jugular venous distention  regular rhythm  S1 normal, S2 normal  no S3, no S4   no murmur  no rub, no thrill  No carotid bruit  pedal pulses normal  lower extremity edema: none    Musculoskeletal:  no clubbing of fingers.   normocephalic, head atraumatic  Skin:   warm, dry  Psychiatric:  judgement and insight appropriate  normal mood and affect    ECHO:    STRESS:    CATH:  No results found for this or any previous visit.    BMP:     Glucose   Date Value Ref Range Status   03/25/2025 94 65 - 99 mg/dL Final     BUN   Date Value Ref Range Status   03/25/2025 7 (L) 8 - 23 mg/dL Final "     Creatinine   Date Value Ref Range Status   03/25/2025 0.76 0.57 - 1.00 mg/dL Final     Sodium   Date Value Ref Range Status   03/25/2025 136 136 - 145 mmol/L Final     Potassium   Date Value Ref Range Status   03/25/2025 4.7 3.5 - 5.2 mmol/L Final     Chloride   Date Value Ref Range Status   03/25/2025 97 (L) 98 - 107 mmol/L Final     CO2   Date Value Ref Range Status   03/25/2025 27.1 22.0 - 29.0 mmol/L Final     Calcium   Date Value Ref Range Status   03/25/2025 10.1 8.6 - 10.5 mg/dL Final     BUN/Creatinine Ratio   Date Value Ref Range Status   03/25/2025 9.2 7.0 - 25.0 Final     Anion Gap   Date Value Ref Range Status   03/25/2025 11.9 5.0 - 15.0 mmol/L Final     eGFR   Date Value Ref Range Status   03/25/2025 88.7 >60.0 mL/min/1.73 Final     LIPIDS:  Total Cholesterol   Date Value Ref Range Status   01/28/2025 158 0 - 200 mg/dL Final     Triglycerides   Date Value Ref Range Status   01/28/2025 85 0 - 150 mg/dL Final     HDL Cholesterol   Date Value Ref Range Status   01/28/2025 58 40 - 60 mg/dL Final     LDL Cholesterol    Date Value Ref Range Status   01/28/2025 84 0 - 100 mg/dL Final     VLDL Cholesterol   Date Value Ref Range Status   01/28/2025 16 5 - 40 mg/dL Final     LDL/HDL Ratio   Date Value Ref Range Status   01/28/2025 1.43  Final         Procedures             ASSESSMENT:  Diagnoses and all orders for this visit:    1. Palpitations (Primary)    2. Postural dizziness with presyncope    3. Tobacco abuse    4. Coronary artery calcification    5. Mixed hyperlipidemia         PLAN:    1.  Patient's palpitations are overall doing well.  We will continue the Toprol 75 mg twice daily.  2.  Patient still notes some lightheadedness which I do think is consistent with orthostasis.  It is not bothering her enough that she wants to consider a medication.  If it got to the point where it was bothering her or she was passing out I would consider midodrine.  3.  Continue the Crestor.  Patient cholesterol  checked 1/28/2025 with LDL 84, HDL 58, triglycerides 85.  These are under pretty good control.  4.  Encouraged the patient to continue to cut down on smoking with goal of quitting.  She was intolerant of Chantix with nausea and had allergic reaction to the patches.  5.  Continue the aspirin.  Patient is on this due to her history of coronary artery calcifications.  6.  Talk to the patient about a regular exercise program.  7.  Will just see back in 1 year, sooner if needed.  She will call us if the palpitations or lightheadedness gets to the point where it is bothering her.      Return in about 6 months (around 11/15/2025).     Patient was given instructions and counseling regarding her condition or for health maintenance advice. Please see specific information pulled into the AVS if appropriate.         Grayson Vang MD   5/15/2025  14:51 EDT

## 2025-05-22 ENCOUNTER — HOSPITAL ENCOUNTER (OUTPATIENT)
Dept: ULTRASOUND IMAGING | Facility: HOSPITAL | Age: 65
Discharge: HOME | End: 2025-05-22
Admitting: NURSE PRACTITIONER
Payer: MEDICARE

## 2025-05-22 DIAGNOSIS — R79.89 ELEVATED LFTS: ICD-10-CM

## 2025-05-22 PROCEDURE — 76705 ECHO EXAM OF ABDOMEN: CPT

## 2025-05-29 ENCOUNTER — TELEPHONE (OUTPATIENT)
Dept: FAMILY MEDICINE CLINIC | Facility: CLINIC | Age: 65
End: 2025-05-29

## 2025-05-29 NOTE — TELEPHONE ENCOUNTER
Caller: Ida Vincent    Relationship to patient: Self    Best call back number: [phone]    Patient is needing: Patient called in and was contacted by gastroenterology and told she could not be seen until September and patient would like to be seen earlier. Patient stated she is open to other areas including [place] if she could be seen sooner. Patient is requesting a call back with advice please.

## 2025-05-31 ENCOUNTER — APPOINTMENT (OUTPATIENT)
Dept: CT IMAGING | Facility: HOSPITAL | Age: 65
End: 2025-05-31
Payer: MEDICARE

## 2025-05-31 ENCOUNTER — HOSPITAL ENCOUNTER (EMERGENCY)
Facility: HOSPITAL | Age: 65
Discharge: HOME | End: 2025-05-31
Attending: EMERGENCY MEDICINE
Payer: MEDICARE

## 2025-05-31 ENCOUNTER — APPOINTMENT (OUTPATIENT)
Dept: GENERAL RADIOLOGY | Facility: HOSPITAL | Age: 65
End: 2025-05-31
Payer: MEDICARE

## 2025-05-31 VITALS
OXYGEN SATURATION: 99 % | HEIGHT: 65 IN | BODY MASS INDEX: 14.18 KG/M2 | SYSTOLIC BLOOD PRESSURE: 121 MMHG | DIASTOLIC BLOOD PRESSURE: 70 MMHG | WEIGHT: 85.1 LBS | RESPIRATION RATE: 16 BRPM | HEART RATE: 99 BPM | TEMPERATURE: 98.1 F

## 2025-05-31 DIAGNOSIS — K56.0 ADYNAMIC ILEUS: Primary | ICD-10-CM

## 2025-05-31 LAB
ALBUMIN SERPL-MCNC: 4.7 G/DL (ref 3.5–5.2)
ALBUMIN/GLOB SERPL: 2.1 G/DL
ALP SERPL-CCNC: 41 U/L (ref 39–117)
ALT SERPL W P-5'-P-CCNC: 9 U/L (ref 1–33)
ANION GAP SERPL CALCULATED.3IONS-SCNC: 13.8 MMOL/L (ref 5–15)
AST SERPL-CCNC: 17 U/L (ref 1–32)
BASOPHILS # BLD AUTO: 0.06 10*3/MM3 (ref 0–0.2)
BASOPHILS NFR BLD AUTO: 0.7 % (ref 0–1.5)
BILIRUB SERPL-MCNC: 0.2 MG/DL (ref 0–1.2)
BILIRUB UR QL STRIP: NEGATIVE
BUN SERPL-MCNC: 11.5 MG/DL (ref 8–23)
BUN/CREAT SERPL: 14.7 (ref 7–25)
CALCIUM SPEC-SCNC: 9.8 MG/DL (ref 8.6–10.5)
CHLORIDE SERPL-SCNC: 100 MMOL/L (ref 98–107)
CLARITY UR: CLEAR
CO2 SERPL-SCNC: 24.2 MMOL/L (ref 22–29)
COLOR UR: YELLOW
CREAT SERPL-MCNC: 0.78 MG/DL (ref 0.57–1)
D-LACTATE SERPL-SCNC: 1 MMOL/L (ref 0.5–2)
DEPRECATED RDW RBC AUTO: 45.5 FL (ref 37–54)
EGFRCR SERPLBLD CKD-EPI 2021: 86 ML/MIN/1.73
EOSINOPHIL # BLD AUTO: 0.07 10*3/MM3 (ref 0–0.4)
EOSINOPHIL NFR BLD AUTO: 0.8 % (ref 0.3–6.2)
ERYTHROCYTE [DISTWIDTH] IN BLOOD BY AUTOMATED COUNT: 13.5 % (ref 12.3–15.4)
GLOBULIN UR ELPH-MCNC: 2.2 GM/DL
GLUCOSE SERPL-MCNC: 81 MG/DL (ref 65–99)
GLUCOSE UR STRIP-MCNC: NEGATIVE MG/DL
HCT VFR BLD AUTO: 38.5 % (ref 34–46.6)
HGB BLD-MCNC: 12.5 G/DL (ref 12–15.9)
HGB UR QL STRIP.AUTO: NEGATIVE
HOLD SPECIMEN: NORMAL
HOLD SPECIMEN: NORMAL
IMM GRANULOCYTES # BLD AUTO: 0.03 10*3/MM3 (ref 0–0.05)
IMM GRANULOCYTES NFR BLD AUTO: 0.3 % (ref 0–0.5)
KETONES UR QL STRIP: NEGATIVE
LEUKOCYTE ESTERASE UR QL STRIP.AUTO: NEGATIVE
LIPASE SERPL-CCNC: 43 U/L (ref 13–60)
LYMPHOCYTES # BLD AUTO: 4.01 10*3/MM3 (ref 0.7–3.1)
LYMPHOCYTES NFR BLD AUTO: 44.1 % (ref 19.6–45.3)
MCH RBC QN AUTO: 29.9 PG (ref 26.6–33)
MCHC RBC AUTO-ENTMCNC: 32.5 G/DL (ref 31.5–35.7)
MCV RBC AUTO: 92.1 FL (ref 79–97)
MONOCYTES # BLD AUTO: 0.5 10*3/MM3 (ref 0.1–0.9)
MONOCYTES NFR BLD AUTO: 5.5 % (ref 5–12)
NEUTROPHILS NFR BLD AUTO: 4.42 10*3/MM3 (ref 1.7–7)
NEUTROPHILS NFR BLD AUTO: 48.6 % (ref 42.7–76)
NITRITE UR QL STRIP: NEGATIVE
NRBC BLD AUTO-RTO: 0 /100 WBC (ref 0–0.2)
PH UR STRIP.AUTO: 6 [PH] (ref 5–8)
PLATELET # BLD AUTO: 217 10*3/MM3 (ref 140–450)
PMV BLD AUTO: 10.3 FL (ref 6–12)
POTASSIUM SERPL-SCNC: 4.1 MMOL/L (ref 3.5–5.2)
PROT SERPL-MCNC: 6.9 G/DL (ref 6–8.5)
PROT UR QL STRIP: NEGATIVE
QT INTERVAL: 413 MS
QTC INTERVAL: 416 MS
RBC # BLD AUTO: 4.18 10*6/MM3 (ref 3.77–5.28)
SODIUM SERPL-SCNC: 138 MMOL/L (ref 136–145)
SP GR UR STRIP: <=1.005 (ref 1–1.03)
UROBILINOGEN UR QL STRIP: NORMAL
WBC NRBC COR # BLD AUTO: 9.09 10*3/MM3 (ref 3.4–10.8)
WHOLE BLOOD HOLD COAG: NORMAL
WHOLE BLOOD HOLD SPECIMEN: NORMAL

## 2025-05-31 PROCEDURE — 36415 COLL VENOUS BLD VENIPUNCTURE: CPT

## 2025-05-31 PROCEDURE — 71045 X-RAY EXAM CHEST 1 VIEW: CPT

## 2025-05-31 PROCEDURE — 99285 EMERGENCY DEPT VISIT HI MDM: CPT

## 2025-05-31 PROCEDURE — 83605 ASSAY OF LACTIC ACID: CPT | Performed by: EMERGENCY MEDICINE

## 2025-05-31 PROCEDURE — 93005 ELECTROCARDIOGRAM TRACING: CPT

## 2025-05-31 PROCEDURE — 83690 ASSAY OF LIPASE: CPT | Performed by: EMERGENCY MEDICINE

## 2025-05-31 PROCEDURE — 74177 CT ABD & PELVIS W/CONTRAST: CPT

## 2025-05-31 PROCEDURE — 85025 COMPLETE CBC W/AUTO DIFF WBC: CPT | Performed by: EMERGENCY MEDICINE

## 2025-05-31 PROCEDURE — 81003 URINALYSIS AUTO W/O SCOPE: CPT | Performed by: EMERGENCY MEDICINE

## 2025-05-31 PROCEDURE — 80053 COMPREHEN METABOLIC PANEL: CPT | Performed by: EMERGENCY MEDICINE

## 2025-05-31 PROCEDURE — 25510000001 IOPAMIDOL PER 1 ML: Performed by: EMERGENCY MEDICINE

## 2025-05-31 RX ORDER — SODIUM CHLORIDE 0.9 % (FLUSH) 0.9 %
10 SYRINGE (ML) INJECTION AS NEEDED
Status: DISCONTINUED | OUTPATIENT
Start: 2025-05-31 | End: 2025-06-01 | Stop reason: HOSPADM

## 2025-05-31 RX ORDER — IOPAMIDOL 755 MG/ML
100 INJECTION, SOLUTION INTRAVASCULAR
Status: COMPLETED | OUTPATIENT
Start: 2025-05-31 | End: 2025-05-31

## 2025-05-31 RX ADMIN — IOPAMIDOL 100 ML: 755 INJECTION, SOLUTION INTRAVENOUS at 20:58

## 2025-05-31 NOTE — ED PROVIDER NOTES
"Time: 6:56 PM EDT  Date of encounter:  5/31/2025  Independent Historian/Clinical History and Information was obtained by:   Patient    History is limited by: N/A    Chief Complaint   Patient presents with    Abdominal Pain         History of Present Illness:  Patient is a 64 y.o. year old female who presents to the emergency department for evaluation of right upper quadrant pain that is been going on for some time.  She had an ultrasound 2 weeks ago which was abnormal.  She was referred to GI and cannot get in until September he was told to come to the ER for further evaluation.  She complains of right-sided upper quadrant pain along with swelling also has episodes of vomiting which is chronic as well as weight loss.  The patient admits to alcoholism, has been sober for 5 years.  BRY[facility] New    Patient Care Team  Primary Care Provider: Jennifer Ho APRN    Past Medical History:     Allergies   Allergen Reactions    Ciprofloxacin Anaphylaxis and Other (See Comments)    Thimerosal (Thiomersal) Swelling    Benadryl [Diphenhydramine] Other (See Comments)     \"It makes me climb the walls and shake\"    Prednisone Other (See Comments)     CHF  Pt stated solu-medrol is okay to take    Prednisolone Itching    Tetanus Immune Globulin Swelling    Thimerosal Swelling    Erythromycin Nausea And Vomiting     Past Medical History:   Diagnosis Date    Abnormal ECG     Acromioclavicular separation 1988    Allergic     Anxiety 1999    Arthritis     Arthritis of neck     Asthma     Cervical disc disorder     Bylging    Cholelithiasis 1999    Congenital heart disease     COPD (chronic obstructive pulmonary disease)     Coronary artery disease     DDD (degenerative disc disease), lumbar     Depression 1999    Dislocation of finger     Diverticulosis 2021    Fibromyalgia     Fibromyalgia, primary     Fracture, clavicle 11/28/19    Left    Frozen shoulder     GERD (gastroesophageal reflux disease)     Heart block     History " of diverticulosis     History of palpitations     Hyperlipemia     Hypothyroidism     Low back pain     Low back strain     Lumbago     Lumbosacral disc disease     Osteopenia 2021    Periarthritis of shoulder     Pneumonia 1976    Premature ventricular contractions (PVCs) (VPCs)     HISTORY OF    Rotator cuff syndrome     Seasonal allergies     Slow to wake up after anesthesia     Substance abuse     Last drink Nov2019    Thyroid disorder      Past Surgical History:   Procedure Laterality Date    ABDOMINOPLASTY      ATRIAL SEPTAL DEFECT REPAIR      BACK SURGERY      Kypoplasty    BREAST AUGMENTATION      BREAST IMPLANT SURGERY Bilateral 10/18/2021    Procedure: BILATERAL REMOVAL OF IMPLANTS;  Surgeon: BRUCE Suresh MD;  Location: [facility];  Service: Plastics;  Laterality: Bilateral;    BREAST SURGERY  10/18/21    CARDIAC CATHETERIZATION      CHOLECYSTECTOMY  1999    COLONOSCOPY  2011    COSMETIC SURGERY      [facility]    DENTAL PROCEDURE      ENDOSCOPY  2011    GALLBLADDER SURGERY      HYSTERECTOMY      MASTOPEXY Bilateral 10/18/2021    Procedure: BILATERAL MASTOPEXY;  Surgeon: BRUCE Suresh MD;  Location: [facility];  Service: Plastics;  Laterality: Bilateral;    OSTEOTOMY      OTHER SURGICAL HISTORY      metal implants     OTHER SURGICAL HISTORY      surgical clips RIGHT FACE ARTERY WITH JAW SURGERY    SEPTOPLASTY  2010    SHOULDER ARTHROSCOPIC ACROMIOCLAVICULAR (AC) JOINT RECONSTRUCTION Right     SHOULDER SURGERY      Rt AC    SINUS SURGERY      SPINE SURGERY  2021    TONSILLECTOMY      TOTAL ABDOMINAL HYSTERECTOMY WITH SALPINGO OOPHORECTOMY      TVH    VERTEBROPLASTY  06/04/2021    Lumbar 2     Family History   Problem Relation Age of Onset    Diabetes Mother     Hypertension Mother     Anxiety disorder Mother     Arthritis Mother     Alcohol abuse Mother     Thyroid disease Mother     Vision loss Mother     Hyperlipidemia Mother     Heart attack Mother     Heart disease Mother     Cancer Mother      Broken bones Mother     Osteoporosis Mother     Cancer Father     Arthritis Father     Early death Father     Mental illness Sister     Arthritis Sister     Hyperlipidemia Sister     Broken bones Sister     Early death Brother     Diabetes Brother     Arthritis Brother     Liver disease Brother     Heart disease Daughter         Cardiac ablation    Other Son     [place] Hyperthermia Neg Hx        Home Medications:  Prior to Admission medications    Medication Sig Start Date End Date Taking? Authorizing Provider   albuterol sulfate  (90 Base) MCG/ACT inhaler Inhale 2 puffs Every 4 (Four) Hours As Needed for Wheezing or Shortness of Air. 4/30/25   Jennifer Ho APRN   aspirin (ASPIRIN LOW DOSE) 81 MG EC tablet Take 1 tablet by mouth Daily. 4/30/25   Jennifer Ho APRN   Calcium Citrate-Vitamin D3 (CITRACAL) 315-6.25 MG-MCG tablet tablet 1 tablet Daily.    Provider, MD Chio   cetirizine (zyrTEC) 10 MG tablet Take 1 tablet by mouth Daily. 4/30/25   Jennifer Ho APRN   chlorpheniramine (Chlor-Trimeton) 4 MG tablet Take 1 tablet by mouth Every 6 (Six) Hours As Needed for Allergies or Rhinitis. 4/30/25   Jennifer Ho APRN   coenzyme Q10 100 MG capsule Take 1 capsule by mouth Daily. 4/30/25   Jennifer Ho APRN   Denosumab (PROLIA SC) Inject  under the skin into the appropriate area as directed Every 6 (Six) Months.    Provider, MD Chio   Fluticasone-Umeclidin-Vilant (Trelegy Ellipta) 100-62.5-25 MCG/ACT inhaler Inhale 1 puff Daily. 4/30/25   Jennifer Ho APRN   levothyroxine (SYNTHROID, LEVOTHROID) 50 MCG tablet Take 1 tablet by mouth Every Morning. 4/30/25   Jennifer Ho APRN   magnesium oxide (MAG-OX) 400 MG tablet Take 1 tablet by mouth Daily. 4/30/25   Jennifer Ho APRN   metoprolol succinate XL (TOPROL-XL) 25 MG 24 hr tablet Take 1 tablet by mouth 2 (Two) Times a Day. 4/30/25   Jennifer Ho APRN   metoprolol  "succinate XL (TOPROL-XL) 50 MG 24 hr tablet Take 1 tablet by mouth 2 (Two) Times a Day. 4/30/25   Jennifer Ho APRN   NON FORMULARY TUMERIC    Provider, MD Chio   ondansetron (ZOFRAN) 8 MG tablet Take 1 tablet by mouth Every 8 (Eight) Hours As Needed for Nausea or Vomiting. 4/30/25   Jennifer Ho APRN   pantoprazole (PROTONIX) 20 MG EC tablet Take 1 tablet by mouth Daily. 4/30/25   Jennifer Ho APRN   QUEtiapine (SEROquel) 100 MG tablet Take 1.5 tablets by mouth Every Night. 4/30/25   Jennifer Ho APRN   rosuvastatin (CRESTOR) 40 MG tablet Take 1 tablet by mouth Every Night. 4/30/25   Jennifer Ho APRN   traZODone (DESYREL) 100 MG tablet Take 2 tablets by mouth Every Night. 4/30/25   Jennifer Ho APRN        Social History:   Social History     Tobacco Use    Smoking status: Some Days     Current packs/day: 0.50     Average packs/day: 0.5 packs/day for 46.4 years (23.2 ttl pk-yrs)     Types: Cigarettes     Start date: 4/1/1976     Last attempt to quit: 4/1/2021    Smokeless tobacco: Never   Vaping Use    Vaping status: Every Day    Substances: Nicotine   Substance Use Topics    Alcohol use: Not Currently    Drug use: Not Currently     Frequency: 7.0 times per week     Types: Marijuana     Comment: Only for sleep         Review of Systems:  Review of Systems   Gastrointestinal:  Positive for abdominal pain and vomiting.        Physical Exam:  /71 (Patient Position: Sitting)   Pulse 59   Temp 98.1 °F (36.7 °C) (Oral)   Resp 18   Ht 165.1 cm (65\")   Wt 38.6 kg (85 lb 1.6 oz)   SpO2 99%   BMI 14.16 kg/m²         Physical Exam  HENT:      Head: Normocephalic.      Mouth/Throat:      Mouth: Mucous membranes are moist.   Eyes:      Pupils: Pupils are equal, round, and reactive to light.   Pulmonary:      Effort: Pulmonary effort is normal.   Abdominal:      General: There is no distension.   Musculoskeletal:      Cervical back: Neck supple. "   Skin:     General: Skin is warm and dry.   Neurological:      General: No focal deficit present.      Mental Status: She is alert and oriented to person, place, and time.   Psychiatric:         Mood and Affect: Mood normal.         Behavior: Behavior normal.                            Medical Decision Making:      Comorbidities that affect care:    None    External Notes reviewed:    None      The following orders were placed and all results were independently analyzed by me:  Orders Placed This Encounter   Procedures    CT Abdomen Pelvis With Contrast    XR Chest 1 View    [place] Draw    Comprehensive Metabolic Panel    Lipase    Urinalysis With Microscopic If Indicated (No Culture) - Urine, Clean Catch    Lactic Acid, Plasma    CBC Auto Differential    NPO Diet NPO Type: Strict NPO    Undress & Gown    ECG 12 Lead Other; Right rib pain    Insert Peripheral IV    CBC & Differential    Green Top (Gel)    Lavender Top    Gold Top - SST    Light Blue Top       Medications Given in the Emergency Department:  Medications   sodium chloride 0.9 % flush 10 mL (has no administration in time range)   iopamidol (ISOVUE-370) 76 % injection 100 mL (100 mL Intravenous Given 5/31/25 2058)        ED Course:    The patient was initially evaluated in the triage area where orders were placed. The patient was later dispositioned by BRYANT Altman.      The patient was advised to stay for completion of workup which includes but is not limited to communication of labs and radiological results, reassessment and plan. The patient was advised that leaving prior to disposition by a provider could result in critical findings that are not communicated to the patient.     ED Course as of 05/31/25 2200   Sat May 31, 2025   2017 Comprehensive Metabolic Panel [AA]      ED Course User Index  [AA] Jordyn Person APRN       Labs:    Lab Results (last 24 hours)       Procedure Component Value Units Date/Time    CBC & Differential [[id number]]   (Abnormal) Collected: 05/31/25 1955    Specimen: Blood Updated: 05/31/25 2000    Narrative:      The following orders were created for panel order CBC & Differential.  Procedure                               Abnormality         Status                     ---------                               -----------         ------                     CBC Auto Differential[[id number]]        Abnormal            Final result                 Please view results for these tests on the individual orders.    Comprehensive Metabolic Panel [[id number]] Collected: 05/31/25 1955    Specimen: Blood Updated: 05/31/25 2032     Glucose 81 mg/dL      BUN 11.5 mg/dL      Creatinine 0.78 mg/dL      Sodium 138 mmol/L      Potassium 4.1 mmol/L      Chloride 100 mmol/L      CO2 24.2 mmol/L      Calcium 9.8 mg/dL      Total Protein 6.9 g/dL      Albumin 4.7 g/dL      ALT (SGPT) 9 U/L      AST (SGOT) 17 U/L      Alkaline Phosphatase 41 U/L      Total Bilirubin 0.2 mg/dL      Globulin 2.2 gm/dL      A/G Ratio 2.1 g/dL      BUN/Creatinine Ratio 14.7     Anion Gap 13.8 mmol/L      eGFR 86.0 mL/min/1.73     Narrative:      GFR Categories in Chronic Kidney Disease (CKD)              GFR Category          GFR (mL/min/1.73)    Interpretation  G1                    90 or greater        Normal or high (1)  G2                    60-89                Mild decrease (1)  G3a                   45-59                Mild to moderate decrease  G3b                   30-44                Moderate to severe decrease  G4                    15-29                Severe decrease  G5                    14 or less           Kidney failure    (1)In the absence of evidence of kidney disease, neither GFR category G1 or G2 fulfill the criteria for CKD.    eGFR calculation 2021 CKD-EPI creatinine equation, which does not include race as a factor    Lipase [[id number]]  (Normal) Collected: 05/31/25 1955    Specimen: Blood Updated: 05/31/25 2031     Lipase 43 U/L     Lactic Acid,  Plasma [[id number]]  (Normal) Collected: 05/31/25 1955    Specimen: Blood Updated: 05/31/25 2029     Lactate 1.0 mmol/L     CBC Auto Differential [[id number]]  (Abnormal) Collected: 05/31/25 1955    Specimen: Blood Updated: 05/31/25 2000     WBC 9.09 10*3/mm3      RBC 4.18 10*6/mm3      Hemoglobin 12.5 g/dL      Hematocrit 38.5 %      MCV 92.1 fL      MCH 29.9 pg      MCHC 32.5 g/dL      RDW 13.5 %      RDW-SD 45.5 fl      MPV 10.3 fL      Platelets 217 10*3/mm3      Neutrophil % 48.6 %      Lymphocyte % 44.1 %      Monocyte % 5.5 %      Eosinophil % 0.8 %      Basophil % 0.7 %      Immature Grans % 0.3 %      Neutrophils, Absolute 4.42 10*3/mm3      Lymphocytes, Absolute 4.01 10*3/mm3      Monocytes, Absolute 0.50 10*3/mm3      Eosinophils, Absolute 0.07 10*3/mm3      Basophils, Absolute 0.06 10*3/mm3      Immature Grans, Absolute 0.03 10*3/mm3      nRBC 0.0 /100 WBC     Urinalysis With Microscopic If Indicated (No Culture) - Urine, Clean Catch [[id number]]  (Normal) Collected: 05/31/25 2040    Specimen: Urine, Clean Catch Updated: 05/31/25 2047     Color, UA Yellow     Appearance, UA Clear     pH, UA 6.0     Specific Gravity, UA <=1.005     Glucose, UA Negative     Ketones, UA Negative     Bilirubin, UA Negative     Blood, UA Negative     Protein, UA Negative     Leuk Esterase, UA Negative     Nitrite, UA Negative     Urobilinogen, UA 0.2 E.U./dL    Narrative:      Urine microscopic not indicated.             Imaging:    CT Abdomen Pelvis With Contrast  Result Date: 5/31/2025  CT ABDOMEN PELVIS W CONTRAST-  Date of exam: 5/31/2025 8:58 PM.  Comparison: 4/13/2021.  INDICATIONS: 64-year-old female with right upper quadrant abdominal pain.  TECHNIQUE: Axial CT images were obtained of the abdomen and pelvis following the uneventful intravenous administration of 70 mL (or less) of Isovue-370 contrast agent. Reconstructed 2D (two-dimensional) coronal and sagittal images were also obtained. Automated exposure control and  iterative construction methods were used. Additionally, the radiation dose reduction device was turned on for each scan per the ALARA (As Low as Reasonably Achievable) protocol. No oral contrast agent was administered for the study. Please see the electronic medical record, EMR (i.e., Epic), for the documented dose of intravenous contrast agent as well as the radiation dose.  FINDINGS: Extensive atherosclerotic changes are noted. No aneurysmal dilatation of the abdominal aorta. No acute intraperitoneal or retroperitoneal hemorrhage. No acute diverticulitis or appendicitis. A generalized adynamic ileus is possible. No mechanical bowel obstruction. No pneumoperitoneum or pneumatosis. Grossly, the mesenteric arteries are patent. No hydronephrosis or obstructive uropathy. No definite urinary tract stone by enhanced CT. The patient has undergone cholecystectomy and hysterectomy. No acute pancreatitis. No adrenal nodule. No hepatomegaly. No splenomegaly. There is chronic calcified granulomatous disease of the abdomen. The patient's left upper extremity is in the scan field-of-view, obscuring detail. There are pelvic phleboliths. Degenerative changes are seen throughout the imaged spine. There has been vertebroplasty/kyphoplasty at L2, new since the prior exam. No acute infiltrate is identified within the partially imaged lung bases.        Extensive atherosclerotic changes are noted. There may be a generalized adynamic ileus. No definite acute findings are appreciated otherwise. Please see above comments for further detail.    Portions of this note were completed with a voice recognition program.  5/31/2025 9:18 PM by Josafat Mendez MD on Workstation: [id number]      XR Chest 1 View  Result Date: 5/31/2025  XR CHEST 1 VW-  Date of exam: 5/31/2025 8:39 PM.  Comparisons: 5/13/2025; 10/27/2016.  INDICATIONS: 64-year-old female with history of right rib pain.  FINDINGS: A single frontal (AP or PA upright portable) chest  radiograph reveals no cardiac enlargement and no acute infiltrate. No pneumothorax is seen. No pleural effusion. There are old right fifth (5th), sixth (6th), and seventh (7th) rib fractures. Chronic calcified granulomatous disease involves the chest. The thoracic aorta is atherosclerotic.       No acute cardiopulmonary disease is seen radiographically.    Portions of this note were completed with a voice recognition program.  5/31/2025 9:11 PM by Josafat Mendez MD on Workstation: [employer]          Differential Diagnosis and Discussion:      Abdominal Pain: Based on the patient's signs and symptoms, I considered abdominal aortic aneurysm, small bowel obstruction, pancreatitis, acute cholecystitis, acute appendecitis, peptic ulcer disease, gastritis, colitis, endocrine disorders, irritable bowel syndrome and other differential diagnosis an etiology of the patient's abdominal pain.    PROCEDURES:    Labs were collected in the emergency department and all labs were reviewed and interpreted by me.  X-ray were performed in the emergency department and all X-ray impressions were independently interpreted by me.  An EKG was performed and the EKG was interpreted by supervising attending.  CT scan was performed in the emergency department and the CT scan radiology impression was interpreted by me.    ECG 12 Lead Other; Right rib pain   Preliminary Result   HEART RATE=61  bpm   RR Trckuvnv=523  ms   NE Rpatvxgt=983  ms   P Horizontal Axis=17  deg   P Front Axis=78  deg   QRSD Kmyuxdux=938  ms   QT Ihaufcly=181  ms   CFbA=360  ms   QRS Axis=69  deg   T Wave Axis=28  deg   - OTHERWISE NORMAL ECG -   Sinus rhythm   Low voltage, extremity leads   Date and Time of Study:2025-05-31 20:41:28           Procedures    MDM  Number of Diagnoses or Management Options  Adynamic ileus  Diagnosis management comments: The patient is resting comfortably and feels better, is alert and in no distress. Repeat examination is unremarkable and benign;  in particular, there's no discomfort at McBurney's point and there is no pulsatile mass. The history, exam, diagnostic testing, and current condition does not suggest acute appendicitis, bowel obstruction, acute cholecystitis, bowel perforation, major gastrointestinal bleeding, severe diverticulitis, abdominal aortic aneurysm, mesenteric ischemia, volvulus, sepsis, or other significant pathology that warrants further testing, continued ED treatment, admission, or surgical evaluation at this point. The vital signs have been stable. The patient does not have uncontrollable pain, intractable vomiting, or other significant symptoms. The patient's condition is stable and appropriate for discharge from the emergency department.   Patient is able to tolerate PO fluids and food.       Amount and/or Complexity of Data Reviewed  Clinical lab tests: reviewed and ordered  Tests in the radiology section of CPT®: reviewed and ordered  Tests in the medicine section of CPT®: reviewed and ordered  Decide to obtain previous medical records or to obtain history from someone other than the patient: yes    Risk of Complications, Morbidity, and/or Mortality  Presenting problems: moderate  Diagnostic procedures: low  Management options: low    Patient Progress  Patient progress: stable                     Patient Care Considerations:          Consultants/Shared Management Plan:    SHARED VISIT: I have discussed the case with my supervising physician, Dr. Esparza who states he agrees with the current plan of care and following up outpatient with GI. The substantive portion of the medical decision was made by the attesting physician who made or approve the management plan and will take responsibility for the patient.  Clinical findings were discussed and ultimate disposition was made in consult with supervising physician.    Social Determinants of Health:    Patient is independent, reliable, and has access to care.       Disposition and Care  Coordination:    Discharged: The patient is suitable and stable for discharge with no need for consideration of admission.    I have explained the patient´s condition, diagnoses and treatment plan based on the information available to me at this time. I have answered questions and addressed any concerns. The patient has a good  understanding of the patient´s diagnosis, condition, and treatment plan as can be expected at this point. The vital signs have been stable. The patient´s condition is stable and appropriate for discharge from the emergency department.      The patient will pursue further outpatient evaluation with the primary care physician or other designated or consulting physician as outlined in the discharge instructions. They are agreeable to this plan of care and follow-up instructions have been explained in detail. The patient has received these instructions in written format and has expressed an understanding of the discharge instructions. The patient is aware that any significant change in condition or worsening of symptoms should prompt an immediate return to this or the closest emergency department or call to 911.    Final diagnoses:   Adynamic ileus        ED Disposition       ED Disposition   Discharge    Condition   Stable    Comment   --               This medical record created using voice recognition software.             Jordyn Person, APRN  05/31/25 9201

## 2025-06-01 NOTE — ED PROVIDER NOTES
"SHARED VISIT ATTESTATION:    This visit was performed by myself and an APC.  I personally approved the management plan/medical decision making and take responsibility for the patient management.      SHARED VISIT NOTE:    Patient is 64 y.o. year old female that presents to the ED for evaluation of abdominal pain.     Physical Exam    ED Course:    /71 (Patient Position: Sitting)   Pulse 59   Temp 98.1 °F (36.7 °C) (Oral)   Resp 18   Ht 165.1 cm (65\")   Wt 38.6 kg (85 lb 1.6 oz)   SpO2 99%   BMI 14.16 kg/m²       The following orders were placed and all results were independently analyzed by me:  Orders Placed This Encounter   Procedures    CT Abdomen Pelvis With Contrast    XR Chest 1 View    [facility] Draw    Comprehensive Metabolic Panel    Lipase    Urinalysis With Microscopic If Indicated (No Culture) - Urine, Clean Catch    Lactic Acid, Plasma    CBC Auto Differential    NPO Diet NPO Type: Strict NPO    Undress & Gown    ECG 12 Lead Other; Right rib pain    Insert Peripheral IV    CBC & Differential    Green Top (Gel)    Lavender Top    Gold Top - SST    Light Blue Top       Medications Given in the Emergency Department:  Medications   sodium chloride 0.9 % flush 10 mL (has no administration in time range)   iopamidol (ISOVUE-370) 76 % injection 100 mL (100 mL Intravenous Given 5/31/25 2058)        ED Course:    ED Course as of 05/31/25 2158   Sat May 31, 2025   2017 Comprehensive Metabolic Panel [AA]      ED Course User Index  [AA] Jordyn Person APRN       Labs:    Lab Results (last 24 hours)       Procedure Component Value Units Date/Time    CBC & Differential [[id number]]  (Abnormal) Collected: 05/31/25 1955    Specimen: Blood Updated: 05/31/25 2000    Narrative:      The following orders were created for panel order CBC & Differential.  Procedure                               Abnormality         Status                     ---------                               -----------         ------           "           CBC Auto Differential[[id number]]        Abnormal            Final result                 Please view results for these tests on the individual orders.    Comprehensive Metabolic Panel [[id number]] Collected: 05/31/25 1955    Specimen: Blood Updated: 05/31/25 2032     Glucose 81 mg/dL      BUN 11.5 mg/dL      Creatinine 0.78 mg/dL      Sodium 138 mmol/L      Potassium 4.1 mmol/L      Chloride 100 mmol/L      CO2 24.2 mmol/L      Calcium 9.8 mg/dL      Total Protein 6.9 g/dL      Albumin 4.7 g/dL      ALT (SGPT) 9 U/L      AST (SGOT) 17 U/L      Alkaline Phosphatase 41 U/L      Total Bilirubin 0.2 mg/dL      Globulin 2.2 gm/dL      A/G Ratio 2.1 g/dL      BUN/Creatinine Ratio 14.7     Anion Gap 13.8 mmol/L      eGFR 86.0 mL/min/1.73     Narrative:      GFR Categories in Chronic Kidney Disease (CKD)              GFR Category          GFR (mL/min/1.73)    Interpretation  G1                    90 or greater        Normal or high (1)  G2                    60-89                Mild decrease (1)  G3a                   45-59                Mild to moderate decrease  G3b                   30-44                Moderate to severe decrease  G4                    15-29                Severe decrease  G5                    14 or less           Kidney failure    (1)In the absence of evidence of kidney disease, neither GFR category G1 or G2 fulfill the criteria for CKD.    eGFR calculation 2021 CKD-EPI creatinine equation, which does not include race as a factor    Lipase [[id number]]  (Normal) Collected: 05/31/25 1955    Specimen: Blood Updated: 05/31/25 2031     Lipase 43 U/L     Lactic Acid, Plasma [[id number]]  (Normal) Collected: 05/31/25 1955    Specimen: Blood Updated: 05/31/25 2029     Lactate 1.0 mmol/L     CBC Auto Differential [[id number]]  (Abnormal) Collected: 05/31/25 1955    Specimen: Blood Updated: 05/31/25 2000     WBC 9.09 10*3/mm3      RBC 4.18 10*6/mm3      Hemoglobin 12.5 g/dL      Hematocrit 38.5 %       MCV 92.1 fL      MCH 29.9 pg      MCHC 32.5 g/dL      RDW 13.5 %      RDW-SD 45.5 fl      MPV 10.3 fL      Platelets 217 10*3/mm3      Neutrophil % 48.6 %      Lymphocyte % 44.1 %      Monocyte % 5.5 %      Eosinophil % 0.8 %      Basophil % 0.7 %      Immature Grans % 0.3 %      Neutrophils, Absolute 4.42 10*3/mm3      Lymphocytes, Absolute 4.01 10*3/mm3      Monocytes, Absolute 0.50 10*3/mm3      Eosinophils, Absolute 0.07 10*3/mm3      Basophils, Absolute 0.06 10*3/mm3      Immature Grans, Absolute 0.03 10*3/mm3      nRBC 0.0 /100 WBC     Urinalysis With Microscopic If Indicated (No Culture) - Urine, Clean Catch [[id number]]  (Normal) Collected: 05/31/25 2040    Specimen: Urine, Clean Catch Updated: 05/31/25 2047     Color, UA Yellow     Appearance, UA Clear     pH, UA 6.0     Specific Gravity, UA <=1.005     Glucose, UA Negative     Ketones, UA Negative     Bilirubin, UA Negative     Blood, UA Negative     Protein, UA Negative     Leuk Esterase, UA Negative     Nitrite, UA Negative     Urobilinogen, UA 0.2 E.U./dL    Narrative:      Urine microscopic not indicated.             Imaging:    CT Abdomen Pelvis With Contrast  Result Date: 5/31/2025  CT ABDOMEN PELVIS W CONTRAST-  Date of exam: 5/31/2025 8:58 PM.  Comparison: 4/13/2021.  INDICATIONS: 64-year-old female with right upper quadrant abdominal pain.  TECHNIQUE: Axial CT images were obtained of the abdomen and pelvis following the uneventful intravenous administration of 70 mL (or less) of Isovue-370 contrast agent. Reconstructed 2D (two-dimensional) coronal and sagittal images were also obtained. Automated exposure control and iterative construction methods were used. Additionally, the radiation dose reduction device was turned on for each scan per the ALARA (As Low as Reasonably Achievable) protocol. No oral contrast agent was administered for the study. Please see the electronic medical record, EMR (i.e., Epic), for the documented dose of intravenous  contrast agent as well as the radiation dose.  FINDINGS: Extensive atherosclerotic changes are noted. No aneurysmal dilatation of the abdominal aorta. No acute intraperitoneal or retroperitoneal hemorrhage. No acute diverticulitis or appendicitis. A generalized adynamic ileus is possible. No mechanical bowel obstruction. No pneumoperitoneum or pneumatosis. Grossly, the mesenteric arteries are patent. No hydronephrosis or obstructive uropathy. No definite urinary tract stone by enhanced CT. The patient has undergone cholecystectomy and hysterectomy. No acute pancreatitis. No adrenal nodule. No hepatomegaly. No splenomegaly. There is chronic calcified granulomatous disease of the abdomen. The patient's left upper extremity is in the scan field-of-view, obscuring detail. There are pelvic phleboliths. Degenerative changes are seen throughout the imaged spine. There has been vertebroplasty/kyphoplasty at L2, new since the prior exam. No acute infiltrate is identified within the partially imaged lung bases.        Extensive atherosclerotic changes are noted. There may be a generalized adynamic ileus. No definite acute findings are appreciated otherwise. Please see above comments for further detail.    Portions of this note were completed with a voice recognition program.  5/31/2025 9:18 PM by Josafat Mendez MD on Workstation: [employer]      XR Chest 1 View  Result Date: 5/31/2025  XR CHEST 1 VW-  Date of exam: 5/31/2025 8:39 PM.  Comparisons: 5/13/2025; 10/27/2016.  INDICATIONS: 64-year-old female with history of right rib pain.  FINDINGS: A single frontal (AP or PA upright portable) chest radiograph reveals no cardiac enlargement and no acute infiltrate. No pneumothorax is seen. No pleural effusion. There are old right fifth (5th), sixth (6th), and seventh (7th) rib fractures. Chronic calcified granulomatous disease involves the chest. The thoracic aorta is atherosclerotic.       No acute cardiopulmonary disease is seen  radiographically.    Portions of this note were completed with a voice recognition program.  5/31/2025 9:11 PM by Josafat Mendez MD on Workstation: [employer]        MDM:    Estelita Esparza MD  21:58 EDT  05/31/25         Hima Esparza MD  06/01/25 0648

## 2025-06-08 LAB
QT INTERVAL: 413 MS
QTC INTERVAL: 416 MS

## 2025-07-30 ENCOUNTER — OFFICE VISIT (OUTPATIENT)
Dept: FAMILY MEDICINE CLINIC | Facility: CLINIC | Age: 65
End: 2025-07-30
Payer: MEDICARE

## 2025-07-30 VITALS
DIASTOLIC BLOOD PRESSURE: 64 MMHG | HEIGHT: 65 IN | OXYGEN SATURATION: 98 % | BODY MASS INDEX: 14.28 KG/M2 | TEMPERATURE: 97.9 F | SYSTOLIC BLOOD PRESSURE: 88 MMHG | WEIGHT: 85.7 LBS | HEART RATE: 62 BPM

## 2025-07-30 DIAGNOSIS — K21.00 GASTROESOPHAGEAL REFLUX DISEASE WITH ESOPHAGITIS WITHOUT HEMORRHAGE: ICD-10-CM

## 2025-07-30 DIAGNOSIS — R11.0 NAUSEA: ICD-10-CM

## 2025-07-30 DIAGNOSIS — K76.9 HEPATOCELLULAR DAMAGE: ICD-10-CM

## 2025-07-30 DIAGNOSIS — E78.5 HYPERLIPIDEMIA, UNSPECIFIED HYPERLIPIDEMIA TYPE: ICD-10-CM

## 2025-07-30 DIAGNOSIS — J45.909 ASTHMA, UNSPECIFIED ASTHMA SEVERITY, UNSPECIFIED WHETHER COMPLICATED, UNSPECIFIED WHETHER PERSISTENT: ICD-10-CM

## 2025-07-30 DIAGNOSIS — E03.9 ACQUIRED HYPOTHYROIDISM: ICD-10-CM

## 2025-07-30 DIAGNOSIS — R93.2 ABNORMAL LIVER ULTRASOUND: ICD-10-CM

## 2025-07-30 DIAGNOSIS — R74.8 ELEVATED LIVER ENZYMES: ICD-10-CM

## 2025-07-30 DIAGNOSIS — Z09 FOLLOW-UP EXAM: Primary | ICD-10-CM

## 2025-07-30 LAB
ALBUMIN SERPL-MCNC: 4.6 G/DL (ref 3.5–5.2)
ALP SERPL-CCNC: 34 U/L (ref 39–117)
ALT SERPL W P-5'-P-CCNC: 10 U/L (ref 1–33)
AST SERPL-CCNC: 21 U/L (ref 1–32)
BILIRUB CONJ SERPL-MCNC: <0.1 MG/DL (ref 0–0.3)
BILIRUB INDIRECT SERPL-MCNC: ABNORMAL MG/DL
BILIRUB SERPL-MCNC: 0.2 MG/DL (ref 0–1.2)
CHOLEST SERPL-MCNC: 148 MG/DL (ref 0–200)
HDLC SERPL-MCNC: 56 MG/DL (ref 40–60)
LDLC SERPL CALC-MCNC: 79 MG/DL (ref 0–100)
LDLC/HDLC SERPL: 1.41 {RATIO}
PROT SERPL-MCNC: 6.9 G/DL (ref 6–8.5)
TRIGL SERPL-MCNC: 66 MG/DL (ref 0–150)
VLDLC SERPL-MCNC: 13 MG/DL (ref 5–40)

## 2025-07-30 PROCEDURE — 80076 HEPATIC FUNCTION PANEL: CPT | Performed by: NURSE PRACTITIONER

## 2025-07-30 PROCEDURE — 80061 LIPID PANEL: CPT | Performed by: NURSE PRACTITIONER

## 2025-07-30 RX ORDER — MAGNESIUM OXIDE 400 MG/1
400 TABLET ORAL DAILY
Qty: 90 TABLET | Refills: 3 | Status: SHIPPED | OUTPATIENT
Start: 2025-07-30

## 2025-07-30 RX ORDER — FLUTICASONE FUROATE, UMECLIDINIUM BROMIDE AND VILANTEROL TRIFENATATE 100; 62.5; 25 UG/1; UG/1; UG/1
1 POWDER RESPIRATORY (INHALATION)
Qty: 60 EACH | Refills: 2 | Status: SHIPPED | OUTPATIENT
Start: 2025-07-30

## 2025-07-30 RX ORDER — METOPROLOL SUCCINATE 50 MG/1
50 TABLET, EXTENDED RELEASE ORAL 2 TIMES DAILY
Qty: 180 TABLET | Refills: 1 | Status: SHIPPED | OUTPATIENT
Start: 2025-07-30

## 2025-07-30 RX ORDER — ALBUTEROL SULFATE 90 UG/1
2 INHALANT RESPIRATORY (INHALATION) EVERY 4 HOURS PRN
Qty: 8 G | Refills: 3 | Status: SHIPPED | OUTPATIENT
Start: 2025-07-30

## 2025-07-30 RX ORDER — PANTOPRAZOLE SODIUM 20 MG/1
20 TABLET, DELAYED RELEASE ORAL DAILY
Qty: 90 TABLET | Refills: 1 | Status: SHIPPED | OUTPATIENT
Start: 2025-07-30

## 2025-07-30 RX ORDER — ONDANSETRON 8 MG/1
8 TABLET, FILM COATED ORAL EVERY 8 HOURS PRN
Qty: 30 TABLET | Refills: 5 | Status: SHIPPED | OUTPATIENT
Start: 2025-07-30

## 2025-07-30 RX ORDER — HYDROXYZINE HYDROCHLORIDE 10 MG/5ML
4 SYRUP ORAL EVERY 6 HOURS PRN
Qty: 60 TABLET | Refills: 1 | Status: SHIPPED | OUTPATIENT
Start: 2025-07-30

## 2025-07-30 RX ORDER — METOPROLOL SUCCINATE 25 MG/1
25 TABLET, EXTENDED RELEASE ORAL 2 TIMES DAILY
Qty: 180 TABLET | Refills: 1 | Status: SHIPPED | OUTPATIENT
Start: 2025-07-30

## 2025-07-30 RX ORDER — LEVOTHYROXINE SODIUM 50 UG/1
50 TABLET ORAL
Qty: 90 TABLET | Refills: 1 | Status: SHIPPED | OUTPATIENT
Start: 2025-07-30

## 2025-07-30 RX ORDER — CETIRIZINE HYDROCHLORIDE 10 MG/1
10 TABLET ORAL DAILY
Qty: 90 TABLET | Refills: 3 | Status: SHIPPED | OUTPATIENT
Start: 2025-07-30

## 2025-07-30 RX ORDER — UBIDECARENONE 100 MG
100 CAPSULE ORAL DAILY
Qty: 90 CAPSULE | Refills: 1 | Status: SHIPPED | OUTPATIENT
Start: 2025-07-30

## 2025-07-30 RX ORDER — TRAZODONE HYDROCHLORIDE 100 MG/1
200 TABLET ORAL NIGHTLY
Qty: 90 TABLET | Refills: 2 | Status: SHIPPED | OUTPATIENT
Start: 2025-07-30

## 2025-07-30 RX ORDER — ASPIRIN 81 MG/1
81 TABLET ORAL DAILY
Qty: 90 TABLET | Refills: 3 | Status: SHIPPED | OUTPATIENT
Start: 2025-07-30

## 2025-07-30 RX ORDER — ROSUVASTATIN CALCIUM 40 MG/1
40 TABLET, COATED ORAL NIGHTLY
Qty: 90 TABLET | Refills: 1 | Status: SHIPPED | OUTPATIENT
Start: 2025-07-30

## 2025-07-30 RX ORDER — QUETIAPINE FUMARATE 100 MG/1
150 TABLET, FILM COATED ORAL NIGHTLY
Qty: 135 TABLET | Refills: 1 | Status: SHIPPED | OUTPATIENT
Start: 2025-07-30

## 2025-07-30 NOTE — PROGRESS NOTES
"Chief Complaint  Med Refill and Abdominal Pain (Rt Side \"liver area\")    Subjective        Medical History: has a past medical history of Abnormal ECG, Acromioclavicular separation (1988), Allergic, Anxiety (1999), Arthritis, Arthritis of neck, Asthma, Cervical disc disorder, Cholelithiasis (1999), Congenital heart disease, COPD (chronic obstructive pulmonary disease), Coronary artery disease, DDD (degenerative disc disease), lumbar, Depression (1999), Dislocation of finger, Diverticulosis (2021), Fibromyalgia, Fibromyalgia, primary, Fracture, clavicle (11/28/19), Frozen shoulder, GERD (gastroesophageal reflux disease), Heart block, History of diverticulosis, History of palpitations, Hyperlipemia, Hypothyroidism, Low back pain, Low back strain, Lumbago, Lumbosacral disc disease, Osteopenia (2021), Periarthritis of shoulder, Pneumonia (1976), Premature ventricular contractions (PVCs) (VPCs), Rotator cuff syndrome, Seasonal allergies, Slow to wake up after anesthesia, Substance abuse, and Thyroid disorder.     Surgical History: has a past surgical history that includes Vertebroplasty (06/04/2021); Tonsillectomy; Hysterectomy; shoulder arthroscopic acromioclavicular (ac) joint reconstruction (Right); Cardiac catheterization; Breast Augmentation; Osteotomy; ASD repair; Dental surgery; Colonoscopy (2011); Esophagogastroduodenoscopy (2011); Gallbladder surgery; Other surgical history; Other surgical history; Abdominoplasty; Mastopexy (Bilateral, 10/18/2021); Breast Implant Revision (Bilateral, 10/18/2021); Breast surgery (10/18/21); Cholecystectomy (1999); Cosmetic surgery; Septoplasty (2010); Spine surgery (2021); Total abdominal hysterectomy w/ bilateral salpingoophorectomy; Sinus surgery; Back surgery; and Shoulder surgery.     Family History: family history includes Alcohol abuse in her mother; Anxiety disorder in her mother; Arthritis in her brother, father, mother, and sister; Broken bones in her mother and sister; " Cancer in her father and mother; Diabetes in her brother and mother; Early death in her brother and father; Heart attack in her mother; Heart disease in her daughter and mother; Hyperlipidemia in her mother and sister; Hypertension in her mother; Liver disease in her brother; Mental illness in her sister; Osteoporosis in her mother; Other in her son; Thyroid disease in her mother; Vision loss in her mother.     Social History: reports that she has been smoking cigarettes. She started smoking about 49 years ago. She has a 23.3 pack-year smoking history. She has never used smokeless tobacco. She reports that she does not currently use alcohol. She reports that she does not currently use drugs after having used the following drugs: Marijuana. Frequency: 7.00 times per week.    Ida Vincent presents to [facility]    History of Present Illness    History of Present Illness  The patient presents for evaluation of right-sided abdominal pain, weight loss, dizziness, constipation, and health maintenance.    She has been unable to secure an appointment with a gastroenterologist, despite being advised to visit the ER. She continues to experience right-sided abdominal pain and swelling. She is uncertain if these symptoms are related to her COVID-19 vaccinations or alcohol consumption. She has discontinued alcohol use. She has been diagnosed with questionable ileus and is concerned about potential liver damage due to her brother's history of cirrhosis from alcoholism. She has been experiencing occasional spasms in her intestines, which can be quite severe at times. She has been taking Protonix 20 mg once daily.    She has been struggling with weight gain, having dropped from 88 pounds to 85 pounds in just 3 weeks. Her morning weight was recorded as 81 pounds. She has been trying to consume high-calorie foods and protein bars.    She has been experiencing dizziness for the past 3 days and has  "noticed a decrease in her blood pressure. She has been maintaining hydration by drinking water and half-and-half iced tea, and has stopped consuming soda. She has also increased her salt intake. Her sodium levels have always been within the normal range. She is under the care of a cardiologist and is not planning to change her cardiac medications as they are effective. Without these medications, she experiences palpitations and feels unwell.    She typically has one bowel movement per day, which can vary from solid to loose or diarrhea-like. She takes magnesium twice daily to manage constipation.    She is due for her Prolia injection in 2025.    Social History:  Diet: She consumes high-calorie foods and protein bars.  Alcohol: She has discontinued alcohol use.  Tobacco: She smokes cigarettes and is planning to stop smoking, transitioning to vaping.  Coffee/Tea/Caffeine-containing Drinks: She drinks half-and-half iced tea.    FAMILY HISTORY  Her brother  of cirrhosis of the liver due to alcoholism.      Objective   Vital Signs:   BP (!) 88/64   Pulse 62   Temp 97.9 °F (36.6 °C)   Ht 165.1 cm (65\")   Wt 38.9 kg (85 lb 11.2 oz)   SpO2 98%   BMI 14.26 kg/m²       Wt Readings from Last 3 Encounters:   25 38.9 kg (85 lb 11.2 oz)   25 38.6 kg (85 lb 1.6 oz)   05/15/25 39.2 kg (86 lb 6.4 oz)        BP Readings from Last 3 Encounters:   25 (!) 88/64   25 121/70   05/15/25 102/50                Physical Exam  Vitals reviewed.   Constitutional:       Appearance: Normal appearance. She is well-developed and underweight.   HENT:      Head: Normocephalic and atraumatic.   Eyes:      Conjunctiva/sclera: Conjunctivae normal.      Pupils: Pupils are equal, round, and reactive to light.   Cardiovascular:      Rate and Rhythm: Normal rate and regular rhythm.      Heart sounds: No murmur heard.  Pulmonary:      Effort: Pulmonary effort is normal.      Breath sounds: Normal breath sounds. No " wheezing.   Abdominal:      Tenderness: There is abdominal tenderness.   Skin:     General: Skin is warm and dry.   Neurological:      Mental Status: She is alert and oriented to person, place, and time.   Psychiatric:         Mood and Affect: Mood and affect normal.         Behavior: Behavior normal.         Thought Content: Thought content normal.         Judgment: Judgment normal.        Result Review :  The following data was reviewed by BRYANT Ruelas on 07/30/2025.  Common labs          3/25/2025    15:21 4/30/2025    16:46 5/31/2025    19:55   Common Labs   Glucose 94   81    BUN 7   11.5    Creatinine 0.76   0.78    Sodium 136   138    Potassium 4.7   4.1    Chloride 97   100    Calcium 10.1   9.8    Albumin 4.8  4.6  4.7    Total Bilirubin 0.4  <0.2  0.2    Alkaline Phosphatase 48  40  41    AST (SGOT) 70  106  17    ALT (SGPT) 131  110  9    WBC   9.09    Hemoglobin   12.5    Hematocrit   38.5    Platelets   217      Data reviewed : previous office note            Current Outpatient Medications on File Prior to Visit   Medication Sig Dispense Refill    Calcium Citrate-Vitamin D3 (CITRACAL) 315-6.25 MG-MCG tablet tablet 1 tablet Daily.      Denosumab (PROLIA SC) Inject  under the skin into the appropriate area as directed Every 6 (Six) Months.      NON FORMULARY TUMERIC      [DISCONTINUED] albuterol sulfate  (90 Base) MCG/ACT inhaler Inhale 2 puffs Every 4 (Four) Hours As Needed for Wheezing or Shortness of Air. 8 g 3    [DISCONTINUED] aspirin (ASPIRIN LOW DOSE) 81 MG EC tablet Take 1 tablet by mouth Daily. 90 tablet 3    [DISCONTINUED] cetirizine (zyrTEC) 10 MG tablet Take 1 tablet by mouth Daily. 90 tablet 3    [DISCONTINUED] chlorpheniramine (Chlor-Trimeton) 4 MG tablet Take 1 tablet by mouth Every 6 (Six) Hours As Needed for Allergies or Rhinitis. 60 tablet 1    [DISCONTINUED] coenzyme Q10 100 MG capsule Take 1 capsule by mouth Daily. 90 capsule 1    [DISCONTINUED]  Fluticasone-Umeclidin-Vilant (Trelegy Ellipta) 100-62.5-25 MCG/ACT inhaler Inhale 1 puff Daily. 60 each 2    [DISCONTINUED] levothyroxine (SYNTHROID, LEVOTHROID) 50 MCG tablet Take 1 tablet by mouth Every Morning. 90 tablet 1    [DISCONTINUED] magnesium oxide (MAG-OX) 400 MG tablet Take 1 tablet by mouth Daily. 90 tablet 3    [DISCONTINUED] metoprolol succinate XL (TOPROL-XL) 25 MG 24 hr tablet Take 1 tablet by mouth 2 (Two) Times a Day. 180 tablet 1    [DISCONTINUED] metoprolol succinate XL (TOPROL-XL) 50 MG 24 hr tablet Take 1 tablet by mouth 2 (Two) Times a Day. 180 tablet 1    [DISCONTINUED] ondansetron (ZOFRAN) 8 MG tablet Take 1 tablet by mouth Every 8 (Eight) Hours As Needed for Nausea or Vomiting. 30 tablet 5    [DISCONTINUED] pantoprazole (PROTONIX) 20 MG EC tablet Take 1 tablet by mouth Daily. 90 tablet 1    [DISCONTINUED] QUEtiapine (SEROquel) 100 MG tablet Take 1.5 tablets by mouth Every Night. 135 tablet 1    [DISCONTINUED] rosuvastatin (CRESTOR) 40 MG tablet Take 1 tablet by mouth Every Night. 90 tablet 1    [DISCONTINUED] traZODone (DESYREL) 100 MG tablet Take 2 tablets by mouth Every Night. 90 tablet 2     No current facility-administered medications on file prior to visit.        Assessment and Plan  Diagnoses and all orders for this visit:    1. Follow-up exam (Primary)    2. Asthma, unspecified asthma severity, unspecified whether complicated, unspecified whether persistent  -     cetirizine (zyrTEC) 10 MG tablet; Take 1 tablet by mouth Daily.  Dispense: 90 tablet; Refill: 3    3. Gastroesophageal reflux disease with esophagitis without hemorrhage  Comments:  Continue on Protonix, patient is agreeable  Orders:  -     pantoprazole (PROTONIX) 20 MG EC tablet; Take 1 tablet by mouth Daily.  Dispense: 90 tablet; Refill: 1  -     ondansetron (ZOFRAN) 8 MG tablet; Take 1 tablet by mouth Every 8 (Eight) Hours As Needed for Nausea or Vomiting.  Dispense: 30 tablet; Refill: 5    4. Hyperlipidemia,  unspecified hyperlipidemia type  -     rosuvastatin (CRESTOR) 40 MG tablet; Take 1 tablet by mouth Every Night.  Dispense: 90 tablet; Refill: 1  -     Lipid Panel    5. Acquired hypothyroidism  -     levothyroxine (SYNTHROID, LEVOTHROID) 50 MCG tablet; Take 1 tablet by mouth Every Morning.  Dispense: 90 tablet; Refill: 1    6. Nausea  Comments:  Chronic nausea needing refill of Zofran.  Orders:  -     ondansetron (ZOFRAN) 8 MG tablet; Take 1 tablet by mouth Every 8 (Eight) Hours As Needed for Nausea or Vomiting.  Dispense: 30 tablet; Refill: 5    7. Abnormal liver ultrasound  -     Hepatic Function Panel    8. Hepatocellular damage  -     Hepatic Function Panel    9. Elevated liver enzymes  -     Hepatic Function Panel    Other orders  -     Fluticasone-Umeclidin-Vilant (Trelegy Ellipta) 100-62.5-25 MCG/ACT inhaler; Inhale 1 puff Daily.  Dispense: 60 each; Refill: 2  -     coenzyme Q10 100 MG capsule; Take 1 capsule by mouth Daily.  Dispense: 90 capsule; Refill: 1  -     QUEtiapine (SEROquel) 100 MG tablet; Take 1.5 tablets by mouth Every Night.  Dispense: 135 tablet; Refill: 1  -     albuterol sulfate  (90 Base) MCG/ACT inhaler; Inhale 2 puffs Every 4 (Four) Hours As Needed for Wheezing or Shortness of Air.  Dispense: 8 g; Refill: 3  -     traZODone (DESYREL) 100 MG tablet; Take 2 tablets by mouth Every Night.  Dispense: 90 tablet; Refill: 2  -     chlorpheniramine (Chlor-Trimeton) 4 MG tablet; Take 1 tablet by mouth Every 6 (Six) Hours As Needed for Allergies or Rhinitis.  Dispense: 60 tablet; Refill: 1  -     metoprolol succinate XL (TOPROL-XL) 25 MG 24 hr tablet; Take 1 tablet by mouth 2 (Two) Times a Day.  Dispense: 180 tablet; Refill: 1  -     magnesium oxide (MAG-OX) 400 MG tablet; Take 1 tablet by mouth Daily.  Dispense: 90 tablet; Refill: 3  -     metoprolol succinate XL (TOPROL-XL) 50 MG 24 hr tablet; Take 1 tablet by mouth 2 (Two) Times a Day.  Dispense: 180 tablet; Refill: 1  -     aspirin  (ASPIRIN LOW DOSE) 81 MG EC tablet; Take 1 tablet by mouth Daily.  Dispense: 90 tablet; Refill: 3        Assessment & Plan  1. Right-sided abdominal pain.  - Reports persistent right-sided abdominal pain and swelling.  - Previous attempts to see a gastroenterologist were unsuccessful, with appointments pushed to September.  - A referral to Dr. Leon or another gastroenterologist will be made to expedite evaluation.  - A hepatic function panel will be ordered to monitor liver enzymes, as it has been almost 3 months since the last check.    2. Weight loss.  - Experienced significant weight loss, dropping from 88 pounds to 85 pounds in three weeks.  - Advised to continue consuming high-calorie foods and protein bars.  - Further evaluation by a gastroenterologist is warranted to investigate potential underlying causes.  - Noted difficulty in gaining weight despite efforts.    3. Dizziness.  - Reports experiencing dizzy spells over the last three days and low blood pressure readings.  - Advised to increase salt intake and ensure adequate hydration.  - Sodium levels will be checked to rule out any abnormalities.  - Continues to manage cardiac medications effectively to prevent palpitations.    4. Constipation.  - Takes magnesium twice a day to manage constipation and prefers this regimen over experiencing prolonged periods without bowel movements.  - No changes to her current management plan are recommended at this time.  - Reports having one bowel movement daily, which she finds manageable.    5. Health maintenance.  - Due for her Prolia injection on 09/25/2025.  - Will receive a call to set up the appointment and ensure all necessary lab work is completed prior to the injection.  - Continues to follow up with cardiology as needed and maintains current cardiac medications.      Follow Up   Return for If symptoms do not improve new concerning symptoms.  Patient was given instructions and counseling regarding her  condition or for health maintenance advice. Please see specific information pulled into the AVS if appropriate.       Part of this note may be electronic transcription/translation of spoken language to printed text using the Dragon dictation system    Patient or patient representative verbalized consent for the use of Ambient Listening during the visit with  BRYANT Ruelas for chart documentation. 7/30/2025  14:34 EDT

## (undated) DEVICE — PK CHST BRST 40

## (undated) DEVICE — BNDG ELAS ELITE V/CLOSE 6IN 5YD LF STRL

## (undated) DEVICE — 3M™ STERI-STRIP™ REINFORCED ADHESIVE SKIN CLOSURES, R1547, 1/2 IN X 4 IN (12 MM X 100 MM), 6 STRIPS/ENVELOPE: Brand: 3M™ STERI-STRIP™

## (undated) DEVICE — Device: Brand: FABCO

## (undated) DEVICE — ANTIBACTERIAL UNDYED BRAIDED (POLYGLACTIN 910), SYNTHETIC ABSORBABLE SUTURE: Brand: COATED VICRYL

## (undated) DEVICE — SOL NACL 0.9PCT 1000ML

## (undated) DEVICE — TUBING, SUCTION, 1/4" X 20', STRAIGHT: Brand: MEDLINE INDUSTRIES, INC.

## (undated) DEVICE — CONTN STRL 32OZ

## (undated) DEVICE — UNDYED MONOFILAMENT (POLYDIOXANONE), ABSORBABLE SYNTHETIC SURGICAL SUTURE: Brand: PDS

## (undated) DEVICE — DRSNG SURESITE WNDW 4X4.5

## (undated) DEVICE — SUT PROLN 4/0 P3 18IN 8699G

## (undated) DEVICE — APPL CHLORAPREP HI/LITE 26ML ORNG

## (undated) DEVICE — PENCL E/S HNDSWTCH SMOKEEVAC HOLSTR 10FT

## (undated) DEVICE — SUT ETHLN 3/0 PS1 18IN 1663H

## (undated) DEVICE — 1010 S-DRAPE TOWEL DRAPE 10/BX: Brand: STERI-DRAPE™

## (undated) DEVICE — SUT MNCRYL 3/0 PS2 18IN MCP497G

## (undated) DEVICE — IRRIGATOR BULB ASEPTO 60CC STRL

## (undated) DEVICE — DRSNG SURESITE123 6X8IN

## (undated) DEVICE — SYS ENSING FLUID M/ ADD MAC1001

## (undated) DEVICE — ASEPTIC FLUID TRANSFER SET: Brand: ASEPTIC FLUID TRANSFER SET

## (undated) DEVICE — ELECTRD BLD EZ CLN MOD XLNG 2.75IN

## (undated) DEVICE — SOL ISO/ALC RUB 70PCT 4OZ

## (undated) DEVICE — SPNG GZ WOVN 4X4IN 12PLY 10/BX STRL

## (undated) DEVICE — SPK PIN NSR FLUID NONVNT 2WY MINI LL LF

## (undated) DEVICE — GLV SURG PREMIERPRO ORTHO LTX PF SZ8.5 BRN

## (undated) DEVICE — ADHS SKIN PREMIERPRO EXOFIN TOPICAL HI/VISC .5ML

## (undated) DEVICE — SPNG LAP 18X18IN LF STRL PK/5

## (undated) DEVICE — NEEDLE, QUINCKE, 20GX3.5": Brand: MEDLINE

## (undated) DEVICE — STPLR SKIN VISISTAT WD 35CT

## (undated) DEVICE — ELECTRD BLD EZ CLN MOD 6.5IN

## (undated) DEVICE — BIOPATCH™ ANTIMICROBIAL DRESSING WITH CHLORHEXIDINE GLUCONATE IS A HYDROPHILLIC POLYURETHANE ABSORPTIVE FOAM WITH CHLORHEXIDINE GLUCONATE (CHG) WHICH INHIBITS BACTERIAL GROWTH UNDER THE DRESSING. THE DRESSING IS INTENDED TO BE USED TO ABSORB EXUDATE, COVER A WOUND CAUSED BY VASCULAR AND NONVASCULAR PERCUTANEOUS MEDICAL DEVICES DURING SURGERY, AS WELL AS REDUCE LOCAL INFECTION AND COLONIZATION OF MICROORGANISMS.: Brand: BIOPATCH